# Patient Record
Sex: MALE | Race: OTHER | Employment: OTHER | URBAN - METROPOLITAN AREA
[De-identification: names, ages, dates, MRNs, and addresses within clinical notes are randomized per-mention and may not be internally consistent; named-entity substitution may affect disease eponyms.]

---

## 2017-03-29 ENCOUNTER — HOSPITAL ENCOUNTER (OUTPATIENT)
Age: 67
Discharge: HOME | End: 2017-03-29
Payer: COMMERCIAL

## 2017-03-29 LAB
ANION GAP SERPL CALCULATED.3IONS-SCNC: 12 MMOL/L (ref 9–17)
BUN BLDV-MCNC: 20 MG/DL (ref 8–23)
BUN/CREAT BLD: 15 (ref 9–20)
CALCIUM SERPL-MCNC: 8.7 MG/DL (ref 8.6–10.4)
CHLORIDE BLD-SCNC: 100 MMOL/L (ref 98–107)
CO2: 24 MMOL/L (ref 20–31)
CREAT SERPL-MCNC: 1.32 MG/DL (ref 0.7–1.2)
GFR AFRICAN AMERICAN: >60 ML/MIN
GFR NON-AFRICAN AMERICAN: 54 ML/MIN
GFR SERPL CREATININE-BSD FRML MDRD: ABNORMAL ML/MIN/{1.73_M2}
GFR SERPL CREATININE-BSD FRML MDRD: ABNORMAL ML/MIN/{1.73_M2}
GLUCOSE BLD-MCNC: 104 MG/DL (ref 70–99)
POTASSIUM SERPL-SCNC: 3.9 MMOL/L (ref 3.7–5.3)
SODIUM BLD-SCNC: 136 MMOL/L (ref 135–144)

## 2017-03-29 PROCEDURE — 36415 COLL VENOUS BLD VENIPUNCTURE: CPT

## 2017-03-29 PROCEDURE — 80048 BASIC METABOLIC PNL TOTAL CA: CPT

## 2017-07-28 ENCOUNTER — HOSPITAL ENCOUNTER (OUTPATIENT)
Age: 67
Discharge: HOME | End: 2017-07-28
Payer: COMMERCIAL

## 2017-07-28 LAB
ABSOLUTE EOS #: 0.2 K/UL (ref 0–0.4)
ABSOLUTE LYMPH #: 0.8 K/UL (ref 1–4.8)
ABSOLUTE MONO #: 0.3 K/UL (ref 0.2–0.8)
ALBUMIN SERPL-MCNC: 3.6 G/DL (ref 3.5–5.2)
ALBUMIN/GLOBULIN RATIO: ABNORMAL (ref 1–2.5)
ALP BLD-CCNC: 72 U/L (ref 40–129)
ALT SERPL-CCNC: 9 U/L (ref 5–41)
ANION GAP SERPL CALCULATED.3IONS-SCNC: 13 MMOL/L (ref 9–17)
AST SERPL-CCNC: 17 U/L
BASOPHILS # BLD: 1 %
BASOPHILS ABSOLUTE: 0 K/UL (ref 0–0.2)
BILIRUB SERPL-MCNC: 0.33 MG/DL (ref 0.3–1.2)
BUN BLDV-MCNC: 17 MG/DL (ref 8–23)
BUN/CREAT BLD: 13 (ref 9–20)
CALCIUM SERPL-MCNC: 8.4 MG/DL (ref 8.6–10.4)
CHLORIDE BLD-SCNC: 102 MMOL/L (ref 98–107)
CHOLESTEROL/HDL RATIO: 3.5
CHOLESTEROL: 148 MG/DL
CO2: 25 MMOL/L (ref 20–31)
CREAT SERPL-MCNC: 1.3 MG/DL (ref 0.7–1.2)
DIFFERENTIAL TYPE: ABNORMAL
EOSINOPHILS RELATIVE PERCENT: 5 %
GFR AFRICAN AMERICAN: >60 ML/MIN
GFR NON-AFRICAN AMERICAN: 55 ML/MIN
GFR SERPL CREATININE-BSD FRML MDRD: ABNORMAL ML/MIN/{1.73_M2}
GFR SERPL CREATININE-BSD FRML MDRD: ABNORMAL ML/MIN/{1.73_M2}
GLUCOSE BLD-MCNC: 104 MG/DL (ref 70–99)
HCT VFR BLD CALC: 36.9 % (ref 41–53)
HDLC SERPL-MCNC: 42 MG/DL
HEMOGLOBIN: 12.1 G/DL (ref 13.5–17.5)
LDL CHOLESTEROL: 92 MG/DL (ref 0–130)
LYMPHOCYTES # BLD: 27 %
MCH RBC QN AUTO: 25.7 PG (ref 26–34)
MCHC RBC AUTO-ENTMCNC: 32.8 G/DL (ref 31–37)
MCV RBC AUTO: 78.3 FL (ref 80–100)
MONOCYTES # BLD: 10 %
PDW BLD-RTO: 14.6 % (ref 11.5–14.5)
PLATELET # BLD: 296 K/UL (ref 130–400)
PLATELET ESTIMATE: ABNORMAL
PMV BLD AUTO: 8.2 FL (ref 6–12)
POTASSIUM SERPL-SCNC: 3.7 MMOL/L (ref 3.7–5.3)
RBC # BLD: 4.71 M/UL (ref 4.5–5.9)
RBC # BLD: ABNORMAL 10*6/UL
SEG NEUTROPHILS: 57 %
SEGMENTED NEUTROPHILS ABSOLUTE COUNT: 1.7 K/UL (ref 1.8–7.7)
SODIUM BLD-SCNC: 140 MMOL/L (ref 135–144)
THYROXINE, FREE: 0.73 NG/DL (ref 0.93–1.7)
TOTAL PROTEIN: 7.2 G/DL (ref 6.4–8.3)
TRIGL SERPL-MCNC: 71 MG/DL
TSH SERPL DL<=0.05 MIU/L-ACNC: 12.13 MIU/L (ref 0.3–5)
VLDLC SERPL CALC-MCNC: NORMAL MG/DL (ref 1–30)
WBC # BLD: 2.9 K/UL (ref 3.5–11)
WBC # BLD: ABNORMAL 10*3/UL

## 2017-07-28 PROCEDURE — 84439 ASSAY OF FREE THYROXINE: CPT

## 2017-07-28 PROCEDURE — 80061 LIPID PANEL: CPT

## 2017-07-28 PROCEDURE — 80053 COMPREHEN METABOLIC PANEL: CPT

## 2017-07-28 PROCEDURE — 85025 COMPLETE CBC W/AUTO DIFF WBC: CPT

## 2017-07-28 PROCEDURE — 84443 ASSAY THYROID STIM HORMONE: CPT

## 2017-07-28 PROCEDURE — 36415 COLL VENOUS BLD VENIPUNCTURE: CPT

## 2018-08-31 ENCOUNTER — HOSPITAL ENCOUNTER (OUTPATIENT)
Dept: PREADMISSION TESTING | Age: 68
Discharge: HOME | End: 2018-09-04
Payer: COMMERCIAL

## 2018-08-31 VITALS
BODY MASS INDEX: 27.7 KG/M2 | WEIGHT: 182.76 LBS | HEIGHT: 68 IN | RESPIRATION RATE: 18 BRPM | SYSTOLIC BLOOD PRESSURE: 138 MMHG | OXYGEN SATURATION: 99 % | DIASTOLIC BLOOD PRESSURE: 90 MMHG | HEART RATE: 54 BPM

## 2018-08-31 LAB
ABSOLUTE EOS #: 0.2 K/UL (ref 0–0.4)
ABSOLUTE IMMATURE GRANULOCYTE: ABNORMAL K/UL (ref 0–0.3)
ABSOLUTE LYMPH #: 0.9 K/UL (ref 1–4.8)
ABSOLUTE MONO #: 0.3 K/UL (ref 0.2–0.8)
ALBUMIN SERPL-MCNC: 3.8 G/DL (ref 3.5–5.2)
ALBUMIN/GLOBULIN RATIO: ABNORMAL (ref 1–2.5)
ALP BLD-CCNC: 70 U/L (ref 40–129)
ALT SERPL-CCNC: 7 U/L (ref 5–41)
ANION GAP SERPL CALCULATED.3IONS-SCNC: 10 MMOL/L (ref 9–17)
AST SERPL-CCNC: 17 U/L
BASOPHILS # BLD: 1 % (ref 0–2)
BASOPHILS ABSOLUTE: 0 K/UL (ref 0–0.2)
BILIRUB SERPL-MCNC: 0.27 MG/DL (ref 0.3–1.2)
BILIRUBIN DIRECT: <0.08 MG/DL
BILIRUBIN, INDIRECT: ABNORMAL MG/DL (ref 0–1)
BUN BLDV-MCNC: 13 MG/DL (ref 8–23)
CHLORIDE BLD-SCNC: 104 MMOL/L (ref 98–107)
CO2: 25 MMOL/L (ref 20–31)
CREAT SERPL-MCNC: 1.22 MG/DL (ref 0.7–1.2)
DIFFERENTIAL TYPE: ABNORMAL
EKG ATRIAL RATE: 50 BPM
EKG P AXIS: 32 DEGREES
EKG P-R INTERVAL: 178 MS
EKG Q-T INTERVAL: 476 MS
EKG QRS DURATION: 114 MS
EKG QTC CALCULATION (BAZETT): 433 MS
EKG R AXIS: -35 DEGREES
EKG T AXIS: 35 DEGREES
EKG VENTRICULAR RATE: 50 BPM
EOSINOPHILS RELATIVE PERCENT: 6 % (ref 1–4)
GFR AFRICAN AMERICAN: >60 ML/MIN
GFR NON-AFRICAN AMERICAN: 59 ML/MIN
GFR SERPL CREATININE-BSD FRML MDRD: ABNORMAL ML/MIN/{1.73_M2}
GFR SERPL CREATININE-BSD FRML MDRD: ABNORMAL ML/MIN/{1.73_M2}
GLOBULIN: ABNORMAL G/DL (ref 1.5–3.8)
HCT VFR BLD CALC: 36.1 % (ref 41–53)
HEMOGLOBIN: 11.9 G/DL (ref 13.5–17.5)
IMMATURE GRANULOCYTES: ABNORMAL %
INR BLD: 1
LYMPHOCYTES # BLD: 21 % (ref 24–44)
MCH RBC QN AUTO: 26.7 PG (ref 26–34)
MCHC RBC AUTO-ENTMCNC: 32.9 G/DL (ref 31–37)
MCV RBC AUTO: 81.4 FL (ref 80–100)
MONOCYTES # BLD: 6 % (ref 1–7)
NRBC AUTOMATED: ABNORMAL PER 100 WBC
PARTIAL THROMBOPLASTIN TIME: 27.8 SEC (ref 23–31)
PDW BLD-RTO: 15.5 % (ref 11.5–14.5)
PLATELET # BLD: 346 K/UL (ref 130–400)
PLATELET ESTIMATE: ABNORMAL
PMV BLD AUTO: 8 FL (ref 6–12)
POTASSIUM SERPL-SCNC: 3.9 MMOL/L (ref 3.7–5.3)
PROTHROMBIN TIME: 10.3 SEC (ref 9.7–11.6)
RBC # BLD: 4.43 M/UL (ref 4.5–5.9)
RBC # BLD: ABNORMAL 10*6/UL
SEG NEUTROPHILS: 66 % (ref 36–66)
SEGMENTED NEUTROPHILS ABSOLUTE COUNT: 2.8 K/UL (ref 1.8–7.7)
SODIUM BLD-SCNC: 139 MMOL/L (ref 135–144)
TOTAL PROTEIN: 7.4 G/DL (ref 6.4–8.3)
WBC # BLD: 4.3 K/UL (ref 3.5–11)
WBC # BLD: ABNORMAL 10*3/UL

## 2018-08-31 PROCEDURE — 85610 PROTHROMBIN TIME: CPT

## 2018-08-31 PROCEDURE — 80076 HEPATIC FUNCTION PANEL: CPT

## 2018-08-31 PROCEDURE — 93005 ELECTROCARDIOGRAM TRACING: CPT

## 2018-08-31 PROCEDURE — 85025 COMPLETE CBC W/AUTO DIFF WBC: CPT

## 2018-08-31 PROCEDURE — 36415 COLL VENOUS BLD VENIPUNCTURE: CPT

## 2018-08-31 PROCEDURE — 84520 ASSAY OF UREA NITROGEN: CPT

## 2018-08-31 PROCEDURE — 85730 THROMBOPLASTIN TIME PARTIAL: CPT

## 2018-08-31 PROCEDURE — 80051 ELECTROLYTE PANEL: CPT

## 2018-08-31 PROCEDURE — 82565 ASSAY OF CREATININE: CPT

## 2018-08-31 RX ORDER — LEVOTHYROXINE SODIUM 0.12 MG/1
175 TABLET ORAL DAILY
COMMUNITY
End: 2020-02-14 | Stop reason: SDUPTHER

## 2018-08-31 RX ORDER — ASPIRIN 81 MG/1
81 TABLET ORAL DAILY
COMMUNITY

## 2018-08-31 RX ORDER — SILDENAFIL 50 MG/1
50 TABLET, FILM COATED ORAL PRN
COMMUNITY
End: 2020-02-14
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
## 2018-08-31 NOTE — PROGRESS NOTES
before surgery and the morning of surgery with a special soap called chlorhexidine gluconate (CHG*). *Not to be used by people allergic to Chlorhexidine Gluconate (CHG). Following these instructions will help you be sure that your skin is clean before surgery. Instructions on cleaning your skin before surgery: The night before your surgery:      You will need to shower with warm water (not hot) and the CHG soap.  Use a clean wash cloth and a clean towel. Have clean clothes available to put on after the shower.    First wash your hair with regular shampoo. Rinse your hair and body thoroughly to remove the shampoo. [facility] Wash your face with your regular soap or water only. Thoroughly rinse your body with warm water from the neck down.  Turn water off to prevent rinsing the soap off too soon.  With a clean wet washcloth and half of the CHG soap in the bottle, lather your entire body from the neck down. Do not use CHG soap near your eyes or ears to avoid injury to those areas.  Wash thoroughly, paying special attention to the area where your surgery will be performed.  Wash your body gently for five (5) minutes. Avoid scrubbing your skin too hard.  Turn the water back on and rinse your body thoroughly.  Pat yourself dry with a clean, soft towel. Do not apply lotion, cream or powder.  Dress with clean freshly washed clothes. The morning of surgery:     Repeat shower following steps above - using remaining half of CHG soap in bottle. Patient Instructions:    [facility] If you are having any type of anesthesia you are to have nothing to eat or drink after midnight the night before your surgery. This includes gum, mints, water or smoking or chewing tobacco.  The only exception to this is a small sip of water to take with any morning dose of heart, blood pressure, or seizure medications. No alcoholic beverages for 24 hours prior to surgery.      Brush your teeth but do not swallow water.  Bring your eyeglasses and case with you. No contacts are to be worn the day of surgery. You also may bring your hearing aids.  If you are on C-PAP or Bi-PAP at home and plan on staying in the hospital overnight for your surgery please bring the machine with you. · Do not wear any jewelry or body piercings day of surgery. Also, NO lotion, perfume or deodorant to be used the day of surgery. No nail polish on the operative extremity (arm/leg surgeries)    · Do not bring any valuables such as jewelry, cash, or credit cards. If you are staying overnight with us, please bring a small bag of personal items.  Please wear loose, comfortable clothing. If you are potentially going to have a cast or brace bring clothing that will fit over them.  In case of illness - If you have cold or flu like symptoms (high fever, runny nose, sore throat, cough, etc.) rash, nausea, vomiting, loose stools, and/or recent contact with someone who has a contagious disease (chicken pox, measles, etc.) Please call your doctor before coming to the hospital.     If your child is having surgery please make arrangements for any other children to be cared for at home on the day of surgery. Other children are not permitted in recovery room and we want you to be able to spend time with the patient. If other arrangements are not available then we suggest that you have a second adult to stay in the waiting room. Day of Surgery/Procedure:    As a patient at [facility] you can expect quality medical and nursing care that is centered on your individual needs. Our goal is to make your surgical experience as comfortable as possible    . Transportation After Your Surgery/Procedure: You will need a friend or family member to drive you home after your procedure.   Your  must be

## 2018-09-06 ENCOUNTER — ANESTHESIA EVENT (OUTPATIENT)
Dept: OPERATING ROOM | Age: 68
End: 2018-09-06
Payer: COMMERCIAL

## 2018-09-07 ENCOUNTER — APPOINTMENT (OUTPATIENT)
Dept: GENERAL RADIOLOGY | Age: 68
End: 2018-09-07
Attending: PODIATRIST
Payer: COMMERCIAL

## 2018-09-07 ENCOUNTER — HOSPITAL ENCOUNTER (OUTPATIENT)
Age: 68
Setting detail: OUTPATIENT SURGERY
Discharge: HOME | End: 2018-09-07
Attending: PODIATRIST | Admitting: PODIATRIST
Payer: COMMERCIAL

## 2018-09-07 ENCOUNTER — ANESTHESIA (OUTPATIENT)
Dept: OPERATING ROOM | Age: 68
End: 2018-09-07
Payer: COMMERCIAL

## 2018-09-07 VITALS
DIASTOLIC BLOOD PRESSURE: 98 MMHG | TEMPERATURE: 97.2 F | HEART RATE: 47 BPM | WEIGHT: 182.76 LBS | OXYGEN SATURATION: 100 % | RESPIRATION RATE: 12 BRPM | SYSTOLIC BLOOD PRESSURE: 159 MMHG | BODY MASS INDEX: 27.7 KG/M2 | HEIGHT: 68 IN

## 2018-09-07 VITALS
RESPIRATION RATE: 11 BRPM | OXYGEN SATURATION: 96 % | DIASTOLIC BLOOD PRESSURE: 75 MMHG | SYSTOLIC BLOOD PRESSURE: 125 MMHG

## 2018-09-07 DIAGNOSIS — G89.18 POSTOPERATIVE PAIN: Primary | ICD-10-CM

## 2018-09-07 DIAGNOSIS — Z98.890 STATUS POST RIGHT FOOT SURGERY: ICD-10-CM

## 2018-09-07 PROCEDURE — 7100000010 HC PHASE II RECOVERY - FIRST 15 MIN: Performed by: PODIATRIST

## 2018-09-07 PROCEDURE — C9359 IMPLNT,BON VOID FILLER-PUTTY: HCPCS | Performed by: PODIATRIST

## 2018-09-07 PROCEDURE — 3700000001 HC ADD 15 MINUTES (ANESTHESIA): Performed by: PODIATRIST

## 2018-09-07 PROCEDURE — 3600000003 HC SURGERY LEVEL 3 BASE: Performed by: PODIATRIST

## 2018-09-07 PROCEDURE — 6360000002 HC RX W HCPCS: Performed by: NURSE ANESTHETIST, CERTIFIED REGISTERED

## 2018-09-07 PROCEDURE — 73660 X-RAY EXAM OF TOE(S): CPT

## 2018-09-07 PROCEDURE — 3209999900 FLUORO FOR SURGICAL PROCEDURES

## 2018-09-07 PROCEDURE — C1713 ANCHOR/SCREW BN/BN,TIS/BN: HCPCS | Performed by: PODIATRIST

## 2018-09-07 PROCEDURE — 7100000011 HC PHASE II RECOVERY - ADDTL 15 MIN: Performed by: PODIATRIST

## 2018-09-07 PROCEDURE — 2720000010 HC SURG SUPPLY STERILE: Performed by: PODIATRIST

## 2018-09-07 PROCEDURE — 2709999900 HC NON-CHARGEABLE SUPPLY: Performed by: PODIATRIST

## 2018-09-07 PROCEDURE — 3700000000 HC ANESTHESIA ATTENDED CARE: Performed by: PODIATRIST

## 2018-09-07 PROCEDURE — 2580000003 HC RX 258: Performed by: NURSE ANESTHETIST, CERTIFIED REGISTERED

## 2018-09-07 PROCEDURE — 3600000013 HC SURGERY LEVEL 3 ADDTL 15MIN: Performed by: PODIATRIST

## 2018-09-07 DEVICE — IMPLANTABLE DEVICE
Type: IMPLANTABLE DEVICE | Site: FOOT | Status: FUNCTIONAL
Brand: ORTHOLOC 3DI

## 2018-09-07 DEVICE — GRAFT BNE PTTY 1 CC DBM: Type: IMPLANTABLE DEVICE | Site: FOOT | Status: FUNCTIONAL

## 2018-09-07 RX ORDER — SODIUM CHLORIDE 0.9 % (FLUSH) 0.9 %
10 SYRINGE (ML) INJECTION EVERY 12 HOURS SCHEDULED
Status: DISCONTINUED | OUTPATIENT
Start: 2018-09-07 | End: 2018-09-07 | Stop reason: HOSPADM

## 2018-09-07 RX ORDER — PROPOFOL 10 MG/ML
INJECTION, EMULSION INTRAVENOUS PRN
Status: DISCONTINUED | OUTPATIENT
Start: 2018-09-07 | End: 2018-09-07 | Stop reason: SDUPTHER

## 2018-09-07 RX ORDER — HYDROMORPHONE HCL 110MG/55ML
0.5 PATIENT CONTROLLED ANALGESIA SYRINGE INTRAVENOUS EVERY 5 MIN PRN
Status: DISCONTINUED | OUTPATIENT
Start: 2018-09-07 | End: 2018-09-07 | Stop reason: HOSPADM

## 2018-09-07 RX ORDER — DIPHENHYDRAMINE HYDROCHLORIDE 50 MG/ML
12.5 INJECTION INTRAMUSCULAR; INTRAVENOUS
Status: DISCONTINUED | OUTPATIENT
Start: 2018-09-07 | End: 2018-09-07 | Stop reason: HOSPADM

## 2018-09-07 RX ORDER — DEXAMETHASONE SODIUM PHOSPHATE 10 MG/ML
4 INJECTION INTRAMUSCULAR; INTRAVENOUS
Status: DISCONTINUED | OUTPATIENT
Start: 2018-09-07 | End: 2018-09-07 | Stop reason: HOSPADM

## 2018-09-07 RX ORDER — FENTANYL CITRATE 50 UG/ML
25 INJECTION, SOLUTION INTRAMUSCULAR; INTRAVENOUS EVERY 5 MIN PRN
Status: DISCONTINUED | OUTPATIENT
Start: 2018-09-07 | End: 2018-09-07 | Stop reason: HOSPADM

## 2018-09-07 RX ORDER — LABETALOL HYDROCHLORIDE 5 MG/ML
5 INJECTION, SOLUTION INTRAVENOUS EVERY 10 MIN PRN
Status: DISCONTINUED | OUTPATIENT
Start: 2018-09-07 | End: 2018-09-07 | Stop reason: HOSPADM

## 2018-09-07 RX ORDER — SODIUM CHLORIDE 9 MG/ML
INJECTION, SOLUTION INTRAVENOUS CONTINUOUS
Status: DISCONTINUED | OUTPATIENT
Start: 2018-09-08 | End: 2018-09-07 | Stop reason: HOSPADM

## 2018-09-07 RX ORDER — SODIUM CHLORIDE, SODIUM LACTATE, POTASSIUM CHLORIDE, CALCIUM CHLORIDE 600; 310; 30; 20 MG/100ML; MG/100ML; MG/100ML; MG/100ML
INJECTION, SOLUTION INTRAVENOUS CONTINUOUS
Status: DISCONTINUED | OUTPATIENT
Start: 2018-09-08 | End: 2018-09-07 | Stop reason: HOSPADM

## 2018-09-07 RX ORDER — PROPOFOL 10 MG/ML
INJECTION, EMULSION INTRAVENOUS CONTINUOUS PRN
Status: DISCONTINUED | OUTPATIENT
Start: 2018-09-07 | End: 2018-09-07 | Stop reason: SDUPTHER

## 2018-09-07 RX ORDER — CEFAZOLIN SODIUM 1 G/3ML
INJECTION, POWDER, FOR SOLUTION INTRAMUSCULAR; INTRAVENOUS PRN
Status: DISCONTINUED | OUTPATIENT
Start: 2018-09-07 | End: 2018-09-07 | Stop reason: SDUPTHER

## 2018-09-07 RX ORDER — ONDANSETRON 2 MG/ML
4 INJECTION INTRAMUSCULAR; INTRAVENOUS
Status: DISCONTINUED | OUTPATIENT
Start: 2018-09-07 | End: 2018-09-07 | Stop reason: HOSPADM

## 2018-09-07 RX ORDER — SODIUM CHLORIDE 0.9 % (FLUSH) 0.9 %
10 SYRINGE (ML) INJECTION PRN
Status: DISCONTINUED | OUTPATIENT
Start: 2018-09-07 | End: 2018-09-07 | Stop reason: HOSPADM

## 2018-09-07 RX ORDER — LIDOCAINE HYDROCHLORIDE 10 MG/ML
1 INJECTION, SOLUTION EPIDURAL; INFILTRATION; INTRACAUDAL; PERINEURAL
Status: DISCONTINUED | OUTPATIENT
Start: 2018-09-07 | End: 2018-09-07 | Stop reason: HOSPADM

## 2018-09-07 RX ORDER — SODIUM CHLORIDE, SODIUM LACTATE, POTASSIUM CHLORIDE, CALCIUM CHLORIDE 600; 310; 30; 20 MG/100ML; MG/100ML; MG/100ML; MG/100ML
INJECTION, SOLUTION INTRAVENOUS CONTINUOUS PRN
Status: DISCONTINUED | OUTPATIENT
Start: 2018-09-07 | End: 2018-09-07 | Stop reason: SDUPTHER

## 2018-09-07 RX ORDER — HYDRALAZINE HYDROCHLORIDE 20 MG/ML
5 INJECTION INTRAMUSCULAR; INTRAVENOUS EVERY 10 MIN PRN
Status: DISCONTINUED | OUTPATIENT
Start: 2018-09-07 | End: 2018-09-07 | Stop reason: HOSPADM

## 2018-09-07 RX ORDER — MEPERIDINE HYDROCHLORIDE 50 MG/ML
12.5 INJECTION INTRAMUSCULAR; INTRAVENOUS; SUBCUTANEOUS EVERY 5 MIN PRN
Status: DISCONTINUED | OUTPATIENT
Start: 2018-09-07 | End: 2018-09-07 | Stop reason: HOSPADM

## 2018-09-07 RX ORDER — FENTANYL CITRATE 50 UG/ML
INJECTION, SOLUTION INTRAMUSCULAR; INTRAVENOUS PRN
Status: DISCONTINUED | OUTPATIENT
Start: 2018-09-07 | End: 2018-09-07 | Stop reason: SDUPTHER

## 2018-09-07 RX ORDER — HYDROCODONE BITARTRATE AND ACETAMINOPHEN 7.5; 325 MG/1; MG/1
1 TABLET ORAL EVERY 6 HOURS PRN
Qty: 28 TABLET | Refills: 0 | Status: SHIPPED | OUTPATIENT
Start: 2018-09-07 | End: 2018-09-14

## 2018-09-07 RX ADMIN — FENTANYL CITRATE 25 MCG: 50 INJECTION, SOLUTION INTRAMUSCULAR; INTRAVENOUS at 09:22

## 2018-09-07 RX ADMIN — PROPOFOL 125 MCG/KG/MIN: 10 INJECTION, EMULSION INTRAVENOUS at 08:55

## 2018-09-07 RX ADMIN — FENTANYL CITRATE 50 MCG: 50 INJECTION, SOLUTION INTRAMUSCULAR; INTRAVENOUS at 08:55

## 2018-09-07 RX ADMIN — CEFAZOLIN 2000 MG: 1 INJECTION, POWDER, FOR SOLUTION INTRAMUSCULAR; INTRAVENOUS at 09:00

## 2018-09-07 RX ADMIN — FENTANYL CITRATE 25 MCG: 50 INJECTION, SOLUTION INTRAMUSCULAR; INTRAVENOUS at 10:00

## 2018-09-07 RX ADMIN — SODIUM CHLORIDE, POTASSIUM CHLORIDE, SODIUM LACTATE AND CALCIUM CHLORIDE: 600; 310; 30; 20 INJECTION, SOLUTION INTRAVENOUS at 08:50

## 2018-09-07 RX ADMIN — PROPOFOL 50 MG: 10 INJECTION, EMULSION INTRAVENOUS at 08:57

## 2018-09-07 ASSESSMENT — PULMONARY FUNCTION TESTS
PIF_VALUE: 1
PIF_VALUE: 0
PIF_VALUE: 1
PIF_VALUE: 0
PIF_VALUE: 1
PIF_VALUE: 0
PIF_VALUE: 1
PIF_VALUE: 0
PIF_VALUE: 0
PIF_VALUE: 1
PIF_VALUE: 0
PIF_VALUE: 1
PIF_VALUE: 0
PIF_VALUE: 1
PIF_VALUE: 1
PIF_VALUE: 0
PIF_VALUE: 1
PIF_VALUE: 0
PIF_VALUE: 1
PIF_VALUE: 0
PIF_VALUE: 0
PIF_VALUE: 1
PIF_VALUE: 1
PIF_VALUE: 0
PIF_VALUE: 1
PIF_VALUE: 1
PIF_VALUE: 0
PIF_VALUE: 1
PIF_VALUE: 0
PIF_VALUE: 1

## 2018-09-07 ASSESSMENT — PAIN SCALES - GENERAL
PAINLEVEL_OUTOF10: 0
PAINLEVEL_OUTOF10: 0

## 2018-09-07 NOTE — ANESTHESIA POSTPROCEDURE EVALUATION
Department of Anesthesiology  Postprocedure Note    Patient: Krystal Duncan  MRN: [id number]  YOB: 1950  Date of evaluation: 9/7/2018  Time:  2:30 PM     Procedure Summary     Date:  09/07/18 Room / Location:  [facility] OR 01 / [facility] OR    Anesthesia Start:  0850 Anesthesia Stop:  1024    Procedure:  RIGHT FOOT ARTHRODESIS  1ST MPJ- [facility] (Right Foot) Diagnosis:  (DX HALLUX VALGUS RIGHT)    Surgeon:  Deidra Myrick DPM Responsible Provider:  Zachary Lloyd MD    Anesthesia Type:  MAC ASA Status:  2          Anesthesia Type: MAC    Darion Phase I: Darion Score: 9    Darion Phase II: Darion Score: 7    Last vitals: Reviewed and per EMR flowsheets.        Anesthesia Post Evaluation    Patient location during evaluation: PACU  Patient participation: complete - patient participated  Level of consciousness: awake  Pain score: 0  Airway patency: patent  Nausea & Vomiting: no nausea and no vomiting  Complications: no  Cardiovascular status: blood pressure returned to baseline  Respiratory status: acceptable  Hydration status: euvolemic

## 2018-09-07 NOTE — H&P
History and Physical Update    Pt Name: Jaspal Khoury  MRN: [id number]  YOB: 1950  Date of evaluation: 9/7/2018      [x] I have reviewed the H&P by Liya Isidro NP on 8/31/18   which meets the criteria for an Interval History and Physical note and is attached below. [x] I have examined  Robin Rashid  There are no changes to the patient who is scheduled for a right hallux valgus by  [facility]. The patient denies health changes, fever, chills, productive cough, SOB, skin changes or chest pain. Vital signs: /84   Pulse 56   Temp 97.3 °F (36.3 °C) (Oral)   Resp 16   Ht 5' 8\" (1.727 m)   Wt 182 lb 12.2 oz (82.9 kg)   SpO2 97%   BMI 27.79 kg/m²     Allergies:  Patient has no known allergies. Medications:    Prior to Admission medications    Medication Sig Start Date End Date Taking? Authorizing Provider   levothyroxine (SYNTHROID) 125 MCG tablet Take 125 mcg by mouth Daily   Yes Historical Provider, MD   hydrochlorothiazide (HYDRODIURIL) 25 MG tablet Take 25 mg by mouth daily  3/13/16  Yes Historical Provider, MD   lisinopril (PRINIVIL;ZESTRIL) 5 MG tablet Take 5 mg by mouth daily   Yes Historical Provider, MD   tamsulosin (FLOMAX) 0.4 MG capsule Take 0.4 mg by mouth daily   Yes Historical Provider, MD   sildenafil (VIAGRA) 50 MG tablet Take 50 mg by mouth as needed for Erectile Dysfunction    Historical Provider, MD   aspirin 81 MG tablet Take 81 mg by mouth daily    Historical Provider, MD       This is a 79 y.o. male who is pleasant, cooperative, alert and oriented x3, in no acute distress. Heart: Heart sounds are normal.  HR regular rate and rhythm without murmur, gallop or rub. Lungs: Normal respiratory effort with good air exchange and clear to auscultation without wheezes or rales bilaterally   Abdomen: soft, nontender, nondistended with bowel sounds .        Labs:  Recent Labs      08/31/18   1537   HGB  11.9*   HCT  36.1*   WBC  4.3   MCV  81.4   PLT  346   NA  139   K  3.9 Family History   Problem Relation Age of Onset    High Blood Pressure Mother      Arthritis Mother      Heart Disease Sister      Stomach Cancer Sister      Diabetes Brother              Review of Systems:      Positive and Negative as described in HPI.     CONSTITUTIONAL: Negative for fevers, chills, sweats, fatigue, and weight loss. HEENT: Bilateral cataract removal. Wears glasses. Full dentures. Negative for hearing changes, dysphagia, runny nose, and throat pain. RESPIRATORY:  Denies asthma, COPD, and sleep apnea. Negative for shortness of breath, cough, congestion, and wheezing. CARDIOVASCULAR:  Negative for chest pain, blood clot, irregular heart beat, and palpitations. GASTROINTESTINAL: History of hepatitis C. Negative for reflux, nausea, vomiting, diarrhea, constipation, change in bowel habits, and abdominal pain. GENITOURINARY: Urinary retention. Negative for difficulty of urination, burning with urination, urgency, enlarged prostate, hematuria, and frequency. INTEGUMENT: Right ankle bruise. Negative for rash and skin lesions. HEMATOLOGIC/LYMPHATIC: Occasional right foot swelling and right arm swelling. ALLERGIC/IMMUNOLOGIC: Negative for urticaria and itching. ENDOCRINE: Hypothyroidism. Negative for diabetes, increase in drinking, increase in urination, and heat or cold intolerance. MUSCULOSKELETAL: Negative joint pains and muscle aches. NEUROLOGICAL: Negative for strokes, headaches, dizziness, lightheadedness, numbness, and tingling extremities. BEHAVIOR/PSYCH: Negative for depression and anxiety.     Physical Exam:   BP (!) 138/90   Pulse 54   Resp 18   Ht 5' 8\" (1.727 m)   Wt 182 lb 12.2 oz (82.9 kg)   SpO2 99%   BMI 27.79 kg/m²     No results for input(s): POCGLU in the last 72 hours.      General Appearance:  Alert, well appearing, and in no acute distress. Mental status:  Oriented to person, place, and time. Head:  Normocephalic and atraumatic.   Eye:  No icterus, redness, pupils equal and reactive, extraocular eye movements intact, and conjunctiva clear. Ear:  Hearing grossly intact. Nose:  No drainage noted. Mouth: No dentition noted. Airway is patent. Mucous membranes moist. No tongue deviation. Neck:  Supple and no carotid bruits noted. Lungs:  Bilateral equal air entry, clear to auscultation, no wheezing, rales or rhonchi, and normal effort. Cardiovascular: Bradycardia. Regular rhythm, no murmur, gallop, and rub. Abdomen:  Soft, non-tender, non-distended, and active bowel sounds. Neurologic:  Muffled speech. Cranial nerves II through XII grossly intact. Strength 5/5 bilaterally. Skin: Left hand skin graft noted. Right ankle bruise. No gross lesions, rashes, or bleeding on exposed skin area. Extremities: Right ankle 1+ non-pitting edema. No left ankle edema noted. Posterior tibial pulses 2+ bilaterally. No calf tenderness with palpation.   Psych: Normal affect.      Investigations:       Laboratory Testing:  Recent Results         Recent Results (from the past 24 hour(s))   CBC Auto Differential     Collection Time: 08/31/18  3:37 PM   Result Value Ref Range     WBC 4.3 3.5 - 11.0 k/uL     RBC 4.43 (L) 4.5 - 5.9 m/uL     Hemoglobin 11.9 (L) 13.5 - 17.5 g/dL     Hematocrit 36.1 (L) 41 - 53 %     MCV 81.4 80 - 100 fL     MCH 26.7 26 - 34 pg     MCHC 32.9 31 - 37 g/dL     RDW 15.5 (H) 11.5 - 14.5 %     Platelets 493 566 - 945 k/uL     MPV 8.0 6.0 - 12.0 fL     NRBC Automated NOT REPORTED per 100 WBC     Differential Type NOT REPORTED       Seg Neutrophils 66 36 - 66 %     Lymphocytes 21 (L) 24 - 44 %     Monocytes 6 1 - 7 %     Eosinophils % 6 (H) 1 - 4 %     Basophils 1 0 - 2 %     Immature Granulocytes NOT REPORTED 0 %     Segs Absolute 2.80 1.8 - 7.7 k/uL     Absolute Lymph # 0.90 (L) 1.0 - 4.8 k/uL     Absolute Mono # 0.30 0.2 - 0.8 k/uL     Absolute Eos # 0.20 0.0 - 0.4 k/uL     Basophils # 0.00 0.0 - 0.2 k/uL     Absolute Immature Granulocyte NOT REPORTED

## 2018-09-07 NOTE — PROGRESS NOTES
Patient and patient's family given education on crutch use. Patient demonstrated the use of crutches appropriately.

## 2018-10-15 ENCOUNTER — ANESTHESIA EVENT (OUTPATIENT)
Dept: OPERATING ROOM | Age: 68
End: 2018-10-15
Payer: COMMERCIAL

## 2018-10-16 ENCOUNTER — ANESTHESIA (OUTPATIENT)
Dept: OPERATING ROOM | Age: 68
End: 2018-10-16
Payer: COMMERCIAL

## 2018-10-16 ENCOUNTER — HOSPITAL ENCOUNTER (OUTPATIENT)
Age: 68
Setting detail: OUTPATIENT SURGERY
Discharge: HOME | End: 2018-10-16
Attending: PODIATRIST | Admitting: PODIATRIST
Payer: COMMERCIAL

## 2018-10-16 VITALS
WEIGHT: 186.2 LBS | DIASTOLIC BLOOD PRESSURE: 86 MMHG | OXYGEN SATURATION: 99 % | TEMPERATURE: 98.2 F | RESPIRATION RATE: 17 BRPM | HEIGHT: 68 IN | HEART RATE: 71 BPM | BODY MASS INDEX: 28.22 KG/M2 | SYSTOLIC BLOOD PRESSURE: 145 MMHG

## 2018-10-16 DIAGNOSIS — L03.115 CELLULITIS OF RIGHT LEG: ICD-10-CM

## 2018-10-16 DIAGNOSIS — M25.571 PAIN IN JOINT OF RIGHT FOOT: Primary | ICD-10-CM

## 2018-10-16 LAB
ANION GAP SERPL CALCULATED.3IONS-SCNC: 11 MMOL/L (ref 9–17)
BUN BLDV-MCNC: 13 MG/DL (ref 8–23)
CHLORIDE BLD-SCNC: 103 MMOL/L (ref 98–107)
CO2: 25 MMOL/L (ref 20–31)
CREAT SERPL-MCNC: 1.41 MG/DL (ref 0.7–1.2)
GFR AFRICAN AMERICAN: >60 ML/MIN
GFR NON-AFRICAN AMERICAN: 50 ML/MIN
GFR SERPL CREATININE-BSD FRML MDRD: ABNORMAL ML/MIN/{1.73_M2}
GFR SERPL CREATININE-BSD FRML MDRD: ABNORMAL ML/MIN/{1.73_M2}
HCT VFR BLD CALC: 33.1 % (ref 41–53)
HEMOGLOBIN: 11.1 G/DL (ref 13.5–17.5)
MCH RBC QN AUTO: 27.2 PG (ref 26–34)
MCHC RBC AUTO-ENTMCNC: 33.6 G/DL (ref 31–37)
MCV RBC AUTO: 80.7 FL (ref 80–100)
NRBC AUTOMATED: ABNORMAL PER 100 WBC
PDW BLD-RTO: 14.4 % (ref 11.5–14.5)
PLATELET # BLD: 367 K/UL (ref 130–400)
PMV BLD AUTO: 8.2 FL (ref 6–12)
POTASSIUM SERPL-SCNC: 3.5 MMOL/L (ref 3.7–5.3)
RBC # BLD: 4.1 M/UL (ref 4.5–5.9)
SODIUM BLD-SCNC: 139 MMOL/L (ref 135–144)
WBC # BLD: 5.7 K/UL (ref 3.5–11)

## 2018-10-16 PROCEDURE — 82565 ASSAY OF CREATININE: CPT

## 2018-10-16 PROCEDURE — 2580000003 HC RX 258: Performed by: ANESTHESIOLOGY

## 2018-10-16 PROCEDURE — 80051 ELECTROLYTE PANEL: CPT

## 2018-10-16 PROCEDURE — 85027 COMPLETE CBC AUTOMATED: CPT

## 2018-10-16 PROCEDURE — 84520 ASSAY OF UREA NITROGEN: CPT

## 2018-10-16 RX ORDER — DIPHENHYDRAMINE HYDROCHLORIDE 50 MG/ML
12.5 INJECTION INTRAMUSCULAR; INTRAVENOUS
Status: DISCONTINUED | OUTPATIENT
Start: 2018-10-16 | End: 2018-10-16 | Stop reason: HOSPADM

## 2018-10-16 RX ORDER — OXYCODONE HYDROCHLORIDE AND ACETAMINOPHEN 5; 325 MG/1; MG/1
1 TABLET ORAL EVERY 6 HOURS PRN
Qty: 28 TABLET | Refills: 0 | Status: SHIPPED | OUTPATIENT
Start: 2018-10-16 | End: 2018-10-23

## 2018-10-16 RX ORDER — FENTANYL CITRATE 50 UG/ML
25 INJECTION, SOLUTION INTRAMUSCULAR; INTRAVENOUS EVERY 5 MIN PRN
Status: DISCONTINUED | OUTPATIENT
Start: 2018-10-16 | End: 2018-10-16 | Stop reason: HOSPADM

## 2018-10-16 RX ORDER — HYDRALAZINE HYDROCHLORIDE 20 MG/ML
5 INJECTION INTRAMUSCULAR; INTRAVENOUS EVERY 10 MIN PRN
Status: DISCONTINUED | OUTPATIENT
Start: 2018-10-16 | End: 2018-10-16 | Stop reason: HOSPADM

## 2018-10-16 RX ORDER — LIDOCAINE HYDROCHLORIDE 10 MG/ML
1 INJECTION, SOLUTION EPIDURAL; INFILTRATION; INTRACAUDAL; PERINEURAL
Status: DISCONTINUED | OUTPATIENT
Start: 2018-10-17 | End: 2018-10-16 | Stop reason: HOSPADM

## 2018-10-16 RX ORDER — ONDANSETRON 2 MG/ML
4 INJECTION INTRAMUSCULAR; INTRAVENOUS
Status: DISCONTINUED | OUTPATIENT
Start: 2018-10-16 | End: 2018-10-16 | Stop reason: HOSPADM

## 2018-10-16 RX ORDER — SODIUM CHLORIDE, SODIUM LACTATE, POTASSIUM CHLORIDE, CALCIUM CHLORIDE 600; 310; 30; 20 MG/100ML; MG/100ML; MG/100ML; MG/100ML
INJECTION, SOLUTION INTRAVENOUS CONTINUOUS
Status: DISCONTINUED | OUTPATIENT
Start: 2018-10-17 | End: 2018-10-16 | Stop reason: HOSPADM

## 2018-10-16 RX ORDER — DEXAMETHASONE SODIUM PHOSPHATE 10 MG/ML
4 INJECTION INTRAMUSCULAR; INTRAVENOUS
Status: DISCONTINUED | OUTPATIENT
Start: 2018-10-16 | End: 2018-10-16 | Stop reason: HOSPADM

## 2018-10-16 RX ORDER — DOXYCYCLINE HYCLATE 100 MG/1
100 CAPSULE ORAL 2 TIMES DAILY
Qty: 14 CAPSULE | Refills: 0 | Status: SHIPPED | OUTPATIENT
Start: 2018-10-16 | End: 2018-10-23

## 2018-10-16 RX ORDER — HYDROMORPHONE HCL 110MG/55ML
0.5 PATIENT CONTROLLED ANALGESIA SYRINGE INTRAVENOUS EVERY 5 MIN PRN
Status: DISCONTINUED | OUTPATIENT
Start: 2018-10-16 | End: 2018-10-16 | Stop reason: HOSPADM

## 2018-10-16 RX ORDER — MEPERIDINE HYDROCHLORIDE 50 MG/ML
12.5 INJECTION INTRAMUSCULAR; INTRAVENOUS; SUBCUTANEOUS EVERY 5 MIN PRN
Status: DISCONTINUED | OUTPATIENT
Start: 2018-10-16 | End: 2018-10-16 | Stop reason: HOSPADM

## 2018-10-16 RX ORDER — LABETALOL HYDROCHLORIDE 5 MG/ML
5 INJECTION, SOLUTION INTRAVENOUS EVERY 10 MIN PRN
Status: DISCONTINUED | OUTPATIENT
Start: 2018-10-16 | End: 2018-10-16 | Stop reason: HOSPADM

## 2018-10-16 RX ADMIN — SODIUM CHLORIDE, POTASSIUM CHLORIDE, SODIUM LACTATE AND CALCIUM CHLORIDE: 600; 310; 30; 20 INJECTION, SOLUTION INTRAVENOUS at 06:28
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
## 2018-11-08 ENCOUNTER — ANESTHESIA EVENT (OUTPATIENT)
Dept: OPERATING ROOM | Age: 68
End: 2018-11-08
Payer: COMMERCIAL

## 2018-11-09 ENCOUNTER — APPOINTMENT (OUTPATIENT)
Dept: GENERAL RADIOLOGY | Age: 68
End: 2018-11-09
Attending: PODIATRIST
Payer: COMMERCIAL

## 2018-11-09 ENCOUNTER — HOSPITAL ENCOUNTER (OUTPATIENT)
Age: 68
Setting detail: OUTPATIENT SURGERY
Discharge: HOME | End: 2018-11-09
Attending: PODIATRIST | Admitting: PODIATRIST
Payer: COMMERCIAL

## 2018-11-09 ENCOUNTER — ANESTHESIA (OUTPATIENT)
Dept: OPERATING ROOM | Age: 68
End: 2018-11-09
Payer: COMMERCIAL

## 2018-11-09 VITALS
SYSTOLIC BLOOD PRESSURE: 158 MMHG | TEMPERATURE: 97.2 F | WEIGHT: 152 LBS | HEART RATE: 52 BPM | HEIGHT: 68 IN | RESPIRATION RATE: 17 BRPM | DIASTOLIC BLOOD PRESSURE: 99 MMHG | BODY MASS INDEX: 23.04 KG/M2 | OXYGEN SATURATION: 100 %

## 2018-11-09 VITALS
RESPIRATION RATE: 14 BRPM | SYSTOLIC BLOOD PRESSURE: 120 MMHG | OXYGEN SATURATION: 100 % | DIASTOLIC BLOOD PRESSURE: 76 MMHG

## 2018-11-09 DIAGNOSIS — Z98.890 POST-OPERATIVE STATE: Primary | ICD-10-CM

## 2018-11-09 LAB
ABSOLUTE EOS #: 0.3 K/UL (ref 0–0.4)
ABSOLUTE IMMATURE GRANULOCYTE: ABNORMAL K/UL (ref 0–0.3)
ABSOLUTE LYMPH #: 0.9 K/UL (ref 1–4.8)
ABSOLUTE MONO #: 0.3 K/UL (ref 0.2–0.8)
AMPHETAMINE SCREEN URINE: NEGATIVE
ANION GAP SERPL CALCULATED.3IONS-SCNC: 12 MMOL/L (ref 9–17)
BARBITURATE SCREEN URINE: NEGATIVE
BASOPHILS # BLD: 1 % (ref 0–2)
BASOPHILS ABSOLUTE: 0 K/UL (ref 0–0.2)
BENZODIAZEPINE SCREEN, URINE: NEGATIVE
BUN BLDV-MCNC: 14 MG/DL (ref 8–23)
BUPRENORPHINE URINE: ABNORMAL
CANNABINOID SCREEN URINE: NEGATIVE
CHLORIDE BLD-SCNC: 106 MMOL/L (ref 98–107)
CO2: 24 MMOL/L (ref 20–31)
COCAINE METABOLITE, URINE: POSITIVE
CREAT SERPL-MCNC: 1.17 MG/DL (ref 0.7–1.2)
DIFFERENTIAL TYPE: ABNORMAL
EKG ATRIAL RATE: 55 BPM
EKG P AXIS: 19 DEGREES
EKG P-R INTERVAL: 164 MS
EKG Q-T INTERVAL: 456 MS
EKG QRS DURATION: 98 MS
EKG QTC CALCULATION (BAZETT): 436 MS
EKG R AXIS: -34 DEGREES
EKG T AXIS: 41 DEGREES
EKG VENTRICULAR RATE: 55 BPM
EOSINOPHILS RELATIVE PERCENT: 9 % (ref 1–4)
GFR AFRICAN AMERICAN: >60 ML/MIN
GFR NON-AFRICAN AMERICAN: >60 ML/MIN
GFR SERPL CREATININE-BSD FRML MDRD: NORMAL ML/MIN/{1.73_M2}
GFR SERPL CREATININE-BSD FRML MDRD: NORMAL ML/MIN/{1.73_M2}
HCT VFR BLD CALC: 35.9 % (ref 41–53)
HEMOGLOBIN: 12 G/DL (ref 13.5–17.5)
IMMATURE GRANULOCYTES: ABNORMAL %
LYMPHOCYTES # BLD: 24 % (ref 24–44)
MCH RBC QN AUTO: 26.9 PG (ref 26–34)
MCHC RBC AUTO-ENTMCNC: 33.4 G/DL (ref 31–37)
MCV RBC AUTO: 80.7 FL (ref 80–100)
MDMA URINE: ABNORMAL
METHADONE SCREEN, URINE: NEGATIVE
METHAMPHETAMINE, URINE: ABNORMAL
MONOCYTES # BLD: 8 % (ref 1–7)
NRBC AUTOMATED: ABNORMAL PER 100 WBC
OPIATES, URINE: NEGATIVE
OXYCODONE SCREEN URINE: NEGATIVE
PDW BLD-RTO: 14.8 % (ref 11.5–14.5)
PHENCYCLIDINE, URINE: NEGATIVE
PLATELET # BLD: 279 K/UL (ref 130–400)
PLATELET ESTIMATE: ABNORMAL
PMV BLD AUTO: 9.2 FL (ref 6–12)
POTASSIUM SERPL-SCNC: 3.7 MMOL/L (ref 3.7–5.3)
PROPOXYPHENE, URINE: ABNORMAL
RBC # BLD: 4.45 M/UL (ref 4.5–5.9)
RBC # BLD: ABNORMAL 10*6/UL
SEG NEUTROPHILS: 58 % (ref 36–66)
SEGMENTED NEUTROPHILS ABSOLUTE COUNT: 2.1 K/UL (ref 1.8–7.7)
SODIUM BLD-SCNC: 142 MMOL/L (ref 135–144)
TEST INFORMATION: ABNORMAL
TRICYCLIC ANTIDEPRESSANTS, UR: ABNORMAL
WBC # BLD: 3.7 K/UL (ref 3.5–11)
WBC # BLD: ABNORMAL 10*3/UL

## 2018-11-09 PROCEDURE — 80051 ELECTROLYTE PANEL: CPT

## 2018-11-09 PROCEDURE — 7100000010 HC PHASE II RECOVERY - FIRST 15 MIN: Performed by: PODIATRIST

## 2018-11-09 PROCEDURE — 7100000001 HC PACU RECOVERY - ADDTL 15 MIN: Performed by: PODIATRIST

## 2018-11-09 PROCEDURE — C1776 JOINT DEVICE (IMPLANTABLE): HCPCS | Performed by: PODIATRIST

## 2018-11-09 PROCEDURE — 80307 DRUG TEST PRSMV CHEM ANLYZR: CPT

## 2018-11-09 PROCEDURE — 73630 X-RAY EXAM OF FOOT: CPT

## 2018-11-09 PROCEDURE — 6360000002 HC RX W HCPCS: Performed by: NURSE ANESTHETIST, CERTIFIED REGISTERED

## 2018-11-09 PROCEDURE — 7100000011 HC PHASE II RECOVERY - ADDTL 15 MIN: Performed by: PODIATRIST

## 2018-11-09 PROCEDURE — 93005 ELECTROCARDIOGRAM TRACING: CPT

## 2018-11-09 PROCEDURE — 85025 COMPLETE CBC W/AUTO DIFF WBC: CPT

## 2018-11-09 PROCEDURE — 6360000002 HC RX W HCPCS: Performed by: ANESTHESIOLOGY

## 2018-11-09 PROCEDURE — 3600000003 HC SURGERY LEVEL 3 BASE: Performed by: PODIATRIST

## 2018-11-09 PROCEDURE — 82565 ASSAY OF CREATININE: CPT

## 2018-11-09 PROCEDURE — 2500000003 HC RX 250 WO HCPCS: Performed by: PODIATRIST

## 2018-11-09 PROCEDURE — 2720000010 HC SURG SUPPLY STERILE: Performed by: PODIATRIST

## 2018-11-09 PROCEDURE — C1713 ANCHOR/SCREW BN/BN,TIS/BN: HCPCS | Performed by: PODIATRIST

## 2018-11-09 PROCEDURE — 84520 ASSAY OF UREA NITROGEN: CPT

## 2018-11-09 PROCEDURE — 3600000013 HC SURGERY LEVEL 3 ADDTL 15MIN: Performed by: PODIATRIST

## 2018-11-09 PROCEDURE — 6360000002 HC RX W HCPCS: Performed by: PODIATRIST

## 2018-11-09 PROCEDURE — 76942 ECHO GUIDE FOR BIOPSY: CPT | Performed by: ANESTHESIOLOGY

## 2018-11-09 PROCEDURE — 3700000001 HC ADD 15 MINUTES (ANESTHESIA): Performed by: PODIATRIST

## 2018-11-09 PROCEDURE — 73620 X-RAY EXAM OF FOOT: CPT

## 2018-11-09 PROCEDURE — 2709999900 HC NON-CHARGEABLE SUPPLY: Performed by: PODIATRIST

## 2018-11-09 PROCEDURE — 2500000003 HC RX 250 WO HCPCS: Performed by: ANESTHESIOLOGY

## 2018-11-09 PROCEDURE — 7100000000 HC PACU RECOVERY - FIRST 15 MIN: Performed by: PODIATRIST

## 2018-11-09 PROCEDURE — 2580000003 HC RX 258: Performed by: ANESTHESIOLOGY

## 2018-11-09 PROCEDURE — 3700000000 HC ANESTHESIA ATTENDED CARE: Performed by: PODIATRIST

## 2018-11-09 PROCEDURE — 3209999900 FLUORO FOR SURGICAL PROCEDURES

## 2018-11-09 DEVICE — IMPLANTABLE DEVICE
Type: IMPLANTABLE DEVICE | Site: FOOT | Status: FUNCTIONAL
Brand: ORTHOLOC 3DI

## 2018-11-09 RX ORDER — OXYCODONE AND ACETAMINOPHEN 10; 325 MG/1; MG/1
1 TABLET ORAL EVERY 6 HOURS PRN
Qty: 28 TABLET | Refills: 0 | Status: CANCELLED | OUTPATIENT
Start: 2018-11-09 | End: 2018-11-16

## 2018-11-09 RX ORDER — MIDAZOLAM HYDROCHLORIDE 1 MG/ML
2 INJECTION INTRAMUSCULAR; INTRAVENOUS ONCE
Status: DISCONTINUED | OUTPATIENT
Start: 2018-11-09 | End: 2018-11-09 | Stop reason: HOSPADM

## 2018-11-09 RX ORDER — DOXYCYCLINE HYCLATE 100 MG/1
100 CAPSULE ORAL 2 TIMES DAILY
Qty: 10 CAPSULE | Refills: 0 | Status: SHIPPED | OUTPATIENT
Start: 2018-11-09 | End: 2018-11-14

## 2018-11-09 RX ORDER — LABETALOL HYDROCHLORIDE 5 MG/ML
5 INJECTION, SOLUTION INTRAVENOUS EVERY 10 MIN PRN
Status: DISCONTINUED | OUTPATIENT
Start: 2018-11-09 | End: 2018-11-09 | Stop reason: HOSPADM

## 2018-11-09 RX ORDER — LIDOCAINE HYDROCHLORIDE 10 MG/ML
1 INJECTION, SOLUTION EPIDURAL; INFILTRATION; INTRACAUDAL; PERINEURAL
Status: DISCONTINUED | OUTPATIENT
Start: 2018-11-10 | End: 2018-11-09 | Stop reason: HOSPADM

## 2018-11-09 RX ORDER — LIDOCAINE HYDROCHLORIDE 10 MG/ML
INJECTION, SOLUTION INFILTRATION; PERINEURAL PRN
Status: DISCONTINUED | OUTPATIENT
Start: 2018-11-09 | End: 2018-11-09 | Stop reason: SDUPTHER

## 2018-11-09 RX ORDER — ROPIVACAINE HYDROCHLORIDE 5 MG/ML
INJECTION, SOLUTION EPIDURAL; INFILTRATION; PERINEURAL PRN
Status: DISCONTINUED | OUTPATIENT
Start: 2018-11-09 | End: 2018-11-09 | Stop reason: SDUPTHER

## 2018-11-09 RX ORDER — PROPOFOL 10 MG/ML
INJECTION, EMULSION INTRAVENOUS CONTINUOUS PRN
Status: DISCONTINUED | OUTPATIENT
Start: 2018-11-09 | End: 2018-11-09 | Stop reason: SDUPTHER

## 2018-11-09 RX ORDER — ONDANSETRON 2 MG/ML
4 INJECTION INTRAMUSCULAR; INTRAVENOUS
Status: DISCONTINUED | OUTPATIENT
Start: 2018-11-09 | End: 2018-11-09 | Stop reason: HOSPADM

## 2018-11-09 RX ORDER — HYDRALAZINE HYDROCHLORIDE 20 MG/ML
INJECTION INTRAMUSCULAR; INTRAVENOUS PRN
Status: DISCONTINUED | OUTPATIENT
Start: 2018-11-09 | End: 2018-11-09 | Stop reason: SDUPTHER

## 2018-11-09 RX ORDER — OXYCODONE HYDROCHLORIDE AND ACETAMINOPHEN 5; 325 MG/1; MG/1
1 TABLET ORAL EVERY 6 HOURS PRN
Qty: 28 TABLET | Refills: 0 | Status: SHIPPED | OUTPATIENT
Start: 2018-11-09 | End: 2018-11-16

## 2018-11-09 RX ORDER — HYDRALAZINE HYDROCHLORIDE 20 MG/ML
5 INJECTION INTRAMUSCULAR; INTRAVENOUS EVERY 10 MIN PRN
Status: DISCONTINUED | OUTPATIENT
Start: 2018-11-09 | End: 2018-11-09 | Stop reason: HOSPADM

## 2018-11-09 RX ORDER — CEFAZOLIN SODIUM 2 G/50ML
2 SOLUTION INTRAVENOUS ONCE
Status: COMPLETED | OUTPATIENT
Start: 2018-11-09 | End: 2018-11-09

## 2018-11-09 RX ORDER — HYDROMORPHONE HCL 110MG/55ML
0.5 PATIENT CONTROLLED ANALGESIA SYRINGE INTRAVENOUS EVERY 5 MIN PRN
Status: DISCONTINUED | OUTPATIENT
Start: 2018-11-09 | End: 2018-11-09 | Stop reason: HOSPADM

## 2018-11-09 RX ORDER — FENTANYL CITRATE 50 UG/ML
25 INJECTION, SOLUTION INTRAMUSCULAR; INTRAVENOUS EVERY 5 MIN PRN
Status: DISCONTINUED | OUTPATIENT
Start: 2018-11-09 | End: 2018-11-09 | Stop reason: HOSPADM

## 2018-11-09 RX ORDER — PROMETHAZINE HYDROCHLORIDE 25 MG/ML
6.25 INJECTION, SOLUTION INTRAMUSCULAR; INTRAVENOUS
Status: DISCONTINUED | OUTPATIENT
Start: 2018-11-09 | End: 2018-11-09 | Stop reason: HOSPADM

## 2018-11-09 RX ORDER — SODIUM CHLORIDE, SODIUM LACTATE, POTASSIUM CHLORIDE, CALCIUM CHLORIDE 600; 310; 30; 20 MG/100ML; MG/100ML; MG/100ML; MG/100ML
INJECTION, SOLUTION INTRAVENOUS CONTINUOUS
Status: DISCONTINUED | OUTPATIENT
Start: 2018-11-10 | End: 2018-11-09 | Stop reason: HOSPADM

## 2018-11-09 RX ADMIN — SODIUM CHLORIDE, POTASSIUM CHLORIDE, SODIUM LACTATE AND CALCIUM CHLORIDE: 600; 310; 30; 20 INJECTION, SOLUTION INTRAVENOUS at 07:41

## 2018-11-09 RX ADMIN — ROPIVACAINE HYDROCHLORIDE 10 ML: 5 INJECTION, SOLUTION EPIDURAL; INFILTRATION; PERINEURAL at 07:32

## 2018-11-09 RX ADMIN — ROPIVACAINE HYDROCHLORIDE 30 ML: 5 INJECTION, SOLUTION EPIDURAL; INFILTRATION; PERINEURAL at 07:30

## 2018-11-09 RX ADMIN — HYDRALAZINE HYDROCHLORIDE 5 MG: 20 INJECTION INTRAMUSCULAR; INTRAVENOUS at 08:35

## 2018-11-09 RX ADMIN — SODIUM CHLORIDE, POTASSIUM CHLORIDE, SODIUM LACTATE AND CALCIUM CHLORIDE: 600; 310; 30; 20 INJECTION, SOLUTION INTRAVENOUS at 06:24

## 2018-11-09 RX ADMIN — LIDOCAINE HYDROCHLORIDE 3 ML: 10 INJECTION, SOLUTION INFILTRATION; PERINEURAL at 07:30

## 2018-11-09 RX ADMIN — PROPOFOL 25 MCG/KG/MIN: 10 INJECTION, EMULSION INTRAVENOUS at 07:58

## 2018-11-09 RX ADMIN — HYDRALAZINE HYDROCHLORIDE 5 MG: 20 INJECTION INTRAMUSCULAR; INTRAVENOUS at 08:19

## 2018-11-09 RX ADMIN — CEFAZOLIN SODIUM 2 G: 2 SOLUTION INTRAVENOUS at 07:47

## 2018-11-09 ASSESSMENT — PULMONARY FUNCTION TESTS
PIF_VALUE: 1
PIF_VALUE: 0
PIF_VALUE: 1
PIF_VALUE: 0
PIF_VALUE: 1
PIF_VALUE: 0
PIF_VALUE: 1
PIF_VALUE: 0
PIF_VALUE: 1

## 2018-11-09 ASSESSMENT — PAIN - FUNCTIONAL ASSESSMENT: PAIN_FUNCTIONAL_ASSESSMENT: 0-10
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
## 2018-11-10 NOTE — OP NOTE
right foot. He may heel  touch weight bear with CAM walker. He was given prescription for  postoperative antibiotics as well as analgesic medication. The patient  will be seen within one week's time in our private office.         Claire Bethea    D: 11/09/2018 9:25:47       T: 11/09/2018 13:34:16     [email or web address]  Job#: [id number]     Doc#: [id number]    CC:

## 2019-01-11 ENCOUNTER — HOSPITAL ENCOUNTER (OUTPATIENT)
Age: 69
Discharge: HOME | End: 2019-01-11
Payer: COMMERCIAL

## 2019-01-11 LAB
C-REACTIVE PROTEIN: 3.2 MG/L (ref 0–5)
SEDIMENTATION RATE, ERYTHROCYTE: 36 MM (ref 0–10)
VITAMIN D 25-HYDROXY: 11.9 NG/ML (ref 30–100)

## 2019-01-11 PROCEDURE — 86140 C-REACTIVE PROTEIN: CPT

## 2019-01-11 PROCEDURE — 82306 VITAMIN D 25 HYDROXY: CPT

## 2019-01-11 PROCEDURE — 85651 RBC SED RATE NONAUTOMATED: CPT

## 2019-01-11 PROCEDURE — 36415 COLL VENOUS BLD VENIPUNCTURE: CPT

## 2019-01-19 ENCOUNTER — HOSPITAL ENCOUNTER (OUTPATIENT)
Age: 69
Discharge: HOME | End: 2019-01-19
Payer: COMMERCIAL

## 2019-01-19 LAB
ABSOLUTE EOS #: 0.31 K/UL (ref 0–0.44)
ABSOLUTE IMMATURE GRANULOCYTE: <0.03 K/UL (ref 0–0.3)
ABSOLUTE LYMPH #: 1.47 K/UL (ref 1.1–3.7)
ABSOLUTE MONO #: 0.43 K/UL (ref 0.1–1.2)
ALBUMIN SERPL-MCNC: 3.8 G/DL (ref 3.5–5.2)
ALBUMIN/GLOBULIN RATIO: 1 (ref 1–2.5)
ALP BLD-CCNC: 71 U/L (ref 40–129)
ALT SERPL-CCNC: 9 U/L (ref 5–41)
ANION GAP SERPL CALCULATED.3IONS-SCNC: 18 MMOL/L (ref 9–17)
AST SERPL-CCNC: 18 U/L
BASOPHILS # BLD: 1 % (ref 0–2)
BASOPHILS ABSOLUTE: 0.08 K/UL (ref 0–0.2)
BILIRUB SERPL-MCNC: 0.25 MG/DL (ref 0.3–1.2)
BUN BLDV-MCNC: 23 MG/DL (ref 8–23)
BUN/CREAT BLD: ABNORMAL (ref 9–20)
CALCIUM SERPL-MCNC: 8.7 MG/DL (ref 8.6–10.4)
CHLORIDE BLD-SCNC: 104 MMOL/L (ref 98–107)
CHOLESTEROL, FASTING: 159 MG/DL
CHOLESTEROL/HDL RATIO: 3.5
CO2: 24 MMOL/L (ref 20–31)
CREAT SERPL-MCNC: 2.52 MG/DL (ref 0.7–1.2)
CREATININE URINE: 109 MG/DL (ref 39–259)
DIFFERENTIAL TYPE: ABNORMAL
EOSINOPHILS RELATIVE PERCENT: 5 % (ref 1–4)
GFR AFRICAN AMERICAN: 31 ML/MIN
GFR NON-AFRICAN AMERICAN: 26 ML/MIN
GFR SERPL CREATININE-BSD FRML MDRD: ABNORMAL ML/MIN/{1.73_M2}
GFR SERPL CREATININE-BSD FRML MDRD: ABNORMAL ML/MIN/{1.73_M2}
GLUCOSE BLD-MCNC: 93 MG/DL (ref 70–99)
HCT VFR BLD CALC: 35.5 % (ref 40.7–50.3)
HDLC SERPL-MCNC: 46 MG/DL
HEMOGLOBIN: 10.8 G/DL (ref 13–17)
IMMATURE GRANULOCYTES: 0 %
LDL CHOLESTEROL: 86 MG/DL (ref 0–130)
LYMPHOCYTES # BLD: 24 % (ref 24–43)
MCH RBC QN AUTO: 25.1 PG (ref 25.2–33.5)
MCHC RBC AUTO-ENTMCNC: 30.4 G/DL (ref 28.4–34.8)
MCV RBC AUTO: 82.4 FL (ref 82.6–102.9)
MICROALBUMIN/CREAT 24H UR: 118 MG/L
MICROALBUMIN/CREAT UR-RTO: 108 MCG/MG CREAT
MONOCYTES # BLD: 7 % (ref 3–12)
NRBC AUTOMATED: 0 PER 100 WBC
PDW BLD-RTO: 14.1 % (ref 11.8–14.4)
PLATELET # BLD: 284 K/UL (ref 138–453)
PLATELET ESTIMATE: ABNORMAL
PMV BLD AUTO: 12.4 FL (ref 8.1–13.5)
POTASSIUM SERPL-SCNC: 3.4 MMOL/L (ref 3.7–5.3)
RBC # BLD: 4.31 M/UL (ref 4.21–5.77)
RBC # BLD: ABNORMAL 10*6/UL
SEG NEUTROPHILS: 63 % (ref 36–65)
SEGMENTED NEUTROPHILS ABSOLUTE COUNT: 3.94 K/UL (ref 1.5–8.1)
SODIUM BLD-SCNC: 146 MMOL/L (ref 135–144)
THYROXINE, FREE: 0.76 NG/DL (ref 0.93–1.7)
TOTAL PROTEIN: 7.7 G/DL (ref 6.4–8.3)
TRIGLYCERIDE, FASTING: 137 MG/DL
TSH SERPL DL<=0.05 MIU/L-ACNC: 19.54 MIU/L (ref 0.3–5)
VLDLC SERPL CALC-MCNC: NORMAL MG/DL (ref 1–30)
WBC # BLD: 6.2 K/UL (ref 3.5–11.3)
WBC # BLD: ABNORMAL 10*3/UL

## 2019-01-19 PROCEDURE — 82570 ASSAY OF URINE CREATININE: CPT

## 2019-01-19 PROCEDURE — 80053 COMPREHEN METABOLIC PANEL: CPT

## 2019-01-19 PROCEDURE — 36415 COLL VENOUS BLD VENIPUNCTURE: CPT

## 2019-01-19 PROCEDURE — 85025 COMPLETE CBC W/AUTO DIFF WBC: CPT

## 2019-01-19 PROCEDURE — 84439 ASSAY OF FREE THYROXINE: CPT

## 2019-01-19 PROCEDURE — 80061 LIPID PANEL: CPT

## 2019-01-19 PROCEDURE — 82043 UR ALBUMIN QUANTITATIVE: CPT

## 2019-01-19 PROCEDURE — 84443 ASSAY THYROID STIM HORMONE: CPT

## 2019-02-15 ENCOUNTER — HOSPITAL ENCOUNTER (OUTPATIENT)
Age: 69
Discharge: HOME | End: 2019-02-15
Payer: COMMERCIAL

## 2019-02-15 LAB
-: NORMAL
AMORPHOUS: NORMAL
ANION GAP SERPL CALCULATED.3IONS-SCNC: 13 MMOL/L (ref 9–17)
BACTERIA: NORMAL
BILIRUBIN URINE: NEGATIVE
BUN BLDV-MCNC: 24 MG/DL (ref 8–23)
BUN/CREAT BLD: ABNORMAL (ref 9–20)
CALCIUM IONIZED: 1.23 MMOL/L (ref 1.13–1.33)
CALCIUM SERPL-MCNC: 8.3 MG/DL (ref 8.6–10.4)
CASTS UA: NORMAL /LPF (ref 0–8)
CHLORIDE BLD-SCNC: 109 MMOL/L (ref 98–107)
CO2: 21 MMOL/L (ref 20–31)
COLOR: YELLOW
COMMENT UA: ABNORMAL
CREAT SERPL-MCNC: 2.27 MG/DL (ref 0.7–1.2)
CREATININE URINE: 110.6 MG/DL (ref 39–259)
CRYSTALS, UA: NORMAL /HPF
EPITHELIAL CELLS UA: NORMAL /HPF (ref 0–5)
GFR AFRICAN AMERICAN: 35 ML/MIN
GFR NON-AFRICAN AMERICAN: 29 ML/MIN
GFR SERPL CREATININE-BSD FRML MDRD: ABNORMAL ML/MIN/{1.73_M2}
GFR SERPL CREATININE-BSD FRML MDRD: ABNORMAL ML/MIN/{1.73_M2}
GLUCOSE BLD-MCNC: 132 MG/DL (ref 70–99)
GLUCOSE URINE: NEGATIVE
HCT VFR BLD CALC: 35.7 % (ref 40.7–50.3)
HEMOGLOBIN: 11.1 G/DL (ref 13–17)
KETONES, URINE: NEGATIVE
LEUKOCYTE ESTERASE, URINE: NEGATIVE
MAGNESIUM: 2.1 MG/DL (ref 1.6–2.6)
MCH RBC QN AUTO: 26.6 PG (ref 25.2–33.5)
MCHC RBC AUTO-ENTMCNC: 31.1 G/DL (ref 28.4–34.8)
MCV RBC AUTO: 85.6 FL (ref 82.6–102.9)
MICROALBUMIN/CREAT 24H UR: 102 MG/L
MICROALBUMIN/CREAT UR-RTO: 92 MCG/MG CREAT
MUCUS: NORMAL
NITRITE, URINE: NEGATIVE
NRBC AUTOMATED: 0 PER 100 WBC
OTHER OBSERVATIONS UA: NORMAL
PDW BLD-RTO: 14.9 % (ref 11.8–14.4)
PH UA: 5 (ref 5–8)
PHOSPHORUS: 2.9 MG/DL (ref 2.5–4.5)
PLATELET # BLD: 242 K/UL (ref 138–453)
PMV BLD AUTO: 12.5 FL (ref 8.1–13.5)
POTASSIUM SERPL-SCNC: 3.6 MMOL/L (ref 3.7–5.3)
PROTEIN UA: ABNORMAL
PTH INTACT: 82.54 PG/ML (ref 15–65)
RBC # BLD: 4.17 M/UL (ref 4.21–5.77)
RBC UA: NORMAL /HPF (ref 0–4)
RENAL EPITHELIAL, UA: NORMAL /HPF
SODIUM BLD-SCNC: 143 MMOL/L (ref 135–144)
SPECIFIC GRAVITY UA: 1.02 (ref 1–1.03)
TRICHOMONAS: NORMAL
TURBIDITY: CLEAR
URINE HGB: ABNORMAL
UROBILINOGEN, URINE: NORMAL
VITAMIN D 25-HYDROXY: 11.7 NG/ML (ref 30–100)
WBC # BLD: 4.1 K/UL (ref 3.5–11.3)
WBC UA: NORMAL /HPF (ref 0–5)
YEAST: NORMAL

## 2019-02-15 PROCEDURE — 81001 URINALYSIS AUTO W/SCOPE: CPT

## 2019-02-15 PROCEDURE — 83735 ASSAY OF MAGNESIUM: CPT

## 2019-02-15 PROCEDURE — 82043 UR ALBUMIN QUANTITATIVE: CPT

## 2019-02-15 PROCEDURE — 85027 COMPLETE CBC AUTOMATED: CPT

## 2019-02-15 PROCEDURE — 82570 ASSAY OF URINE CREATININE: CPT

## 2019-02-15 PROCEDURE — 36415 COLL VENOUS BLD VENIPUNCTURE: CPT

## 2019-02-15 PROCEDURE — 83970 ASSAY OF PARATHORMONE: CPT

## 2019-02-15 PROCEDURE — 82306 VITAMIN D 25 HYDROXY: CPT

## 2019-02-15 PROCEDURE — 84100 ASSAY OF PHOSPHORUS: CPT

## 2019-02-15 PROCEDURE — 82330 ASSAY OF CALCIUM: CPT

## 2019-02-15 PROCEDURE — 80048 BASIC METABOLIC PNL TOTAL CA: CPT

## 2019-03-01 ENCOUNTER — HOSPITAL ENCOUNTER (OUTPATIENT)
Age: 69
Discharge: HOME | End: 2019-03-01
Payer: COMMERCIAL

## 2019-03-01 LAB
-: NORMAL
ABSOLUTE EOS #: 0.27 K/UL (ref 0–0.44)
ABSOLUTE IMMATURE GRANULOCYTE: <0.03 K/UL (ref 0–0.3)
ABSOLUTE LYMPH #: 1.02 K/UL (ref 1.1–3.7)
ABSOLUTE MONO #: 0.31 K/UL (ref 0.1–1.2)
AMORPHOUS: NORMAL
ANION GAP SERPL CALCULATED.3IONS-SCNC: 14 MMOL/L (ref 9–17)
BACTERIA: NORMAL
BASOPHILS # BLD: 1 % (ref 0–2)
BASOPHILS ABSOLUTE: 0.04 K/UL (ref 0–0.2)
BILIRUBIN URINE: NEGATIVE
BUN BLDV-MCNC: 22 MG/DL (ref 8–23)
BUN/CREAT BLD: ABNORMAL (ref 9–20)
CALCIUM SERPL-MCNC: 8.4 MG/DL (ref 8.6–10.4)
CASTS UA: NORMAL /LPF (ref 0–8)
CHLORIDE BLD-SCNC: 109 MMOL/L (ref 98–107)
CO2: 24 MMOL/L (ref 20–31)
COLOR: YELLOW
COMMENT UA: ABNORMAL
COMPLEMENT C3: 78 MG/DL (ref 90–180)
COMPLEMENT C4: 22 MG/DL (ref 10–40)
CREAT SERPL-MCNC: 2.09 MG/DL (ref 0.7–1.2)
CREATININE URINE: 170.9 MG/DL (ref 39–259)
CRYSTALS, UA: NORMAL /HPF
DIFFERENTIAL TYPE: ABNORMAL
EOSINOPHILS RELATIVE PERCENT: 8 % (ref 1–4)
EPITHELIAL CELLS UA: NORMAL /HPF (ref 0–5)
FOLATE: 14.2 NG/ML
GFR AFRICAN AMERICAN: 38 ML/MIN
GFR NON-AFRICAN AMERICAN: 32 ML/MIN
GFR SERPL CREATININE-BSD FRML MDRD: ABNORMAL ML/MIN/{1.73_M2}
GFR SERPL CREATININE-BSD FRML MDRD: ABNORMAL ML/MIN/{1.73_M2}
GLUCOSE BLD-MCNC: 86 MG/DL (ref 70–99)
GLUCOSE URINE: NEGATIVE
HCT VFR BLD CALC: 34.6 % (ref 40.7–50.3)
HEMOGLOBIN: 10.6 G/DL (ref 13–17)
IMMATURE GRANULOCYTES: 0 %
IRON SATURATION: 13 % (ref 20–55)
IRON: 37 UG/DL (ref 59–158)
KETONES, URINE: NEGATIVE
LEUKOCYTE ESTERASE, URINE: NEGATIVE
LYMPHOCYTES # BLD: 31 % (ref 24–43)
MAGNESIUM: 2.2 MG/DL (ref 1.6–2.6)
MCH RBC QN AUTO: 26 PG (ref 25.2–33.5)
MCHC RBC AUTO-ENTMCNC: 30.6 G/DL (ref 28.4–34.8)
MCV RBC AUTO: 84.8 FL (ref 82.6–102.9)
MONOCYTES # BLD: 10 % (ref 3–12)
MUCUS: NORMAL
NITRITE, URINE: NEGATIVE
NRBC AUTOMATED: 0 PER 100 WBC
OTHER OBSERVATIONS UA: NORMAL
PDW BLD-RTO: 14.8 % (ref 11.8–14.4)
PH UA: 5 (ref 5–8)
PHOSPHORUS: 4 MG/DL (ref 2.5–4.5)
PLATELET # BLD: 242 K/UL (ref 138–453)
PLATELET ESTIMATE: ABNORMAL
PMV BLD AUTO: 12.3 FL (ref 8.1–13.5)
POTASSIUM SERPL-SCNC: 3.8 MMOL/L (ref 3.7–5.3)
PROTEIN UA: ABNORMAL
RBC # BLD: 4.08 M/UL (ref 4.21–5.77)
RBC # BLD: ABNORMAL 10*6/UL
RBC UA: NORMAL /HPF (ref 0–4)
RENAL EPITHELIAL, UA: NORMAL /HPF
SEG NEUTROPHILS: 50 % (ref 36–65)
SEGMENTED NEUTROPHILS ABSOLUTE COUNT: 1.64 K/UL (ref 1.5–8.1)
SODIUM BLD-SCNC: 147 MMOL/L (ref 135–144)
SPECIFIC GRAVITY UA: 1.02 (ref 1–1.03)
TOTAL IRON BINDING CAPACITY: 284 UG/DL (ref 250–450)
TOTAL PROTEIN, URINE: 76 MG/DL
TRICHOMONAS: NORMAL
TURBIDITY: CLEAR
UNSATURATED IRON BINDING CAPACITY: 247 UG/DL (ref 112–347)
URINE HGB: ABNORMAL
UROBILINOGEN, URINE: NORMAL
VITAMIN B-12: 294 PG/ML (ref 232–1245)
VITAMIN D 25-HYDROXY: 15.1 NG/ML (ref 30–100)
WBC # BLD: 3.3 K/UL (ref 3.5–11.3)
WBC # BLD: ABNORMAL 10*3/UL
WBC UA: NORMAL /HPF (ref 0–5)
YEAST: NORMAL

## 2019-03-01 PROCEDURE — 84155 ASSAY OF PROTEIN SERUM: CPT

## 2019-03-01 PROCEDURE — 84156 ASSAY OF PROTEIN URINE: CPT

## 2019-03-01 PROCEDURE — 36415 COLL VENOUS BLD VENIPUNCTURE: CPT

## 2019-03-01 PROCEDURE — 84165 PROTEIN E-PHORESIS SERUM: CPT

## 2019-03-01 PROCEDURE — 83735 ASSAY OF MAGNESIUM: CPT

## 2019-03-01 PROCEDURE — 82306 VITAMIN D 25 HYDROXY: CPT

## 2019-03-01 PROCEDURE — 86160 COMPLEMENT ANTIGEN: CPT

## 2019-03-01 PROCEDURE — 82746 ASSAY OF FOLIC ACID SERUM: CPT

## 2019-03-01 PROCEDURE — 81001 URINALYSIS AUTO W/SCOPE: CPT

## 2019-03-01 PROCEDURE — 82607 VITAMIN B-12: CPT

## 2019-03-01 PROCEDURE — 82728 ASSAY OF FERRITIN: CPT

## 2019-03-01 PROCEDURE — 83550 IRON BINDING TEST: CPT

## 2019-03-01 PROCEDURE — 82570 ASSAY OF URINE CREATININE: CPT

## 2019-03-01 PROCEDURE — 83540 ASSAY OF IRON: CPT

## 2019-03-01 PROCEDURE — 84100 ASSAY OF PHOSPHORUS: CPT

## 2019-03-01 PROCEDURE — 85025 COMPLETE CBC W/AUTO DIFF WBC: CPT

## 2019-03-01 PROCEDURE — 86038 ANTINUCLEAR ANTIBODIES: CPT

## 2019-03-01 PROCEDURE — 80048 BASIC METABOLIC PNL TOTAL CA: CPT

## 2019-03-02 LAB — FERRITIN: 70 UG/L (ref 30–400)

## 2019-03-04 LAB
ALBUMIN (CALCULATED): 4 G/DL (ref 3.2–5.2)
ALBUMIN PERCENT: 58 % (ref 45–65)
ALPHA 1 PERCENT: 3 % (ref 3–6)
ALPHA 2 PERCENT: 9 % (ref 6–13)
ALPHA-1-GLOBULIN: 0.2 G/DL (ref 0.1–0.4)
ALPHA-2-GLOBULIN: 0.7 G/DL (ref 0.5–0.9)
ANTI-NUCLEAR ANTIBODY (ANA): NEGATIVE
BETA GLOBULIN: 0.7 G/DL (ref 0.5–1.1)
BETA PERCENT: 10 % (ref 11–19)
GAMMA GLOBULIN %: 20 % (ref 9–20)
GAMMA GLOBULIN: 1.4 G/DL (ref 0.5–1.5)
PATHOLOGIST: ABNORMAL
PROTEIN ELECTROPHORESIS, SERUM: ABNORMAL
TOTAL PROT. SUM,%: 100 % (ref 98–102)
TOTAL PROT. SUM: 7 G/DL (ref 6.3–8.2)
TOTAL PROTEIN: 7 G/DL (ref 6.4–8.3)

## 2020-02-14 ENCOUNTER — HOSPITAL ENCOUNTER (INPATIENT)
Age: 70
LOS: 3 days | Discharge: NURSING, REHAB OR LONG-TERM CARE | DRG: 504 | End: 2020-02-18
Attending: EMERGENCY MEDICINE | Admitting: INTERNAL MEDICINE
Payer: COMMERCIAL

## 2020-02-14 ENCOUNTER — APPOINTMENT (OUTPATIENT)
Dept: GENERAL RADIOLOGY | Age: 70
DRG: 504 | End: 2020-02-14
Payer: COMMERCIAL

## 2020-02-14 LAB
ABSOLUTE EOS #: 0.24 K/UL (ref 0–0.44)
ABSOLUTE IMMATURE GRANULOCYTE: 0 K/UL (ref 0–0.3)
ABSOLUTE LYMPH #: 0.63 K/UL (ref 1.1–3.7)
ABSOLUTE MONO #: 0.47 K/UL (ref 0.1–1.2)
ANION GAP SERPL CALCULATED.3IONS-SCNC: 12 MMOL/L (ref 9–17)
BASOPHILS # BLD: 1 % (ref 0–2)
BASOPHILS ABSOLUTE: 0.08 K/UL (ref 0–0.2)
BUN BLDV-MCNC: 22 MG/DL (ref 8–23)
BUN/CREAT BLD: 12 (ref 9–20)
CALCIUM SERPL-MCNC: 8.6 MG/DL (ref 8.6–10.4)
CHLORIDE BLD-SCNC: 99 MMOL/L (ref 98–107)
CO2: 24 MMOL/L (ref 20–31)
CREAT SERPL-MCNC: 1.77 MG/DL (ref 0.7–1.2)
DIFFERENTIAL TYPE: ABNORMAL
EOSINOPHILS RELATIVE PERCENT: 3 % (ref 1–4)
GFR AFRICAN AMERICAN: 46 ML/MIN
GFR NON-AFRICAN AMERICAN: 38 ML/MIN
GFR SERPL CREATININE-BSD FRML MDRD: ABNORMAL ML/MIN/{1.73_M2}
GFR SERPL CREATININE-BSD FRML MDRD: ABNORMAL ML/MIN/{1.73_M2}
GLUCOSE BLD-MCNC: 111 MG/DL (ref 70–99)
HCT VFR BLD CALC: 32 % (ref 40.7–50.3)
HEMOGLOBIN: 10.1 G/DL (ref 13–17)
IMMATURE GRANULOCYTES: 0 %
LYMPHOCYTES # BLD: 8 % (ref 24–43)
MCH RBC QN AUTO: 27.2 PG (ref 25.2–33.5)
MCHC RBC AUTO-ENTMCNC: 31.6 G/DL (ref 28.4–34.8)
MCV RBC AUTO: 86 FL (ref 82.6–102.9)
MONOCYTES # BLD: 6 % (ref 3–12)
NRBC AUTOMATED: 0 PER 100 WBC
PDW BLD-RTO: 12.8 % (ref 11.8–14.4)
PLATELET # BLD: 332 K/UL (ref 138–453)
PLATELET ESTIMATE: ABNORMAL
PMV BLD AUTO: 10.2 FL (ref 8.1–13.5)
POTASSIUM SERPL-SCNC: 3.5 MMOL/L (ref 3.7–5.3)
RBC # BLD: 3.72 M/UL (ref 4.21–5.77)
RBC # BLD: ABNORMAL 10*6/UL
SEDIMENTATION RATE, ERYTHROCYTE: 80 MM (ref 0–15)
SEG NEUTROPHILS: 82 % (ref 36–65)
SEGMENTED NEUTROPHILS ABSOLUTE COUNT: 6.48 K/UL (ref 1.5–8.1)
SODIUM BLD-SCNC: 135 MMOL/L (ref 135–144)
WBC # BLD: 7.9 K/UL (ref 3.5–11.3)
WBC # BLD: ABNORMAL 10*3/UL

## 2020-02-14 PROCEDURE — 6360000002 HC RX W HCPCS: Performed by: EMERGENCY MEDICINE

## 2020-02-14 PROCEDURE — 73630 X-RAY EXAM OF FOOT: CPT

## 2020-02-14 PROCEDURE — 80048 BASIC METABOLIC PNL TOTAL CA: CPT

## 2020-02-14 PROCEDURE — 85651 RBC SED RATE NONAUTOMATED: CPT

## 2020-02-14 PROCEDURE — 85025 COMPLETE CBC W/AUTO DIFF WBC: CPT

## 2020-02-14 PROCEDURE — 6370000000 HC RX 637 (ALT 250 FOR IP): Performed by: STUDENT IN AN ORGANIZED HEALTH CARE EDUCATION/TRAINING PROGRAM

## 2020-02-14 PROCEDURE — 86140 C-REACTIVE PROTEIN: CPT

## 2020-02-14 PROCEDURE — 96375 TX/PRO/DX INJ NEW DRUG ADDON: CPT

## 2020-02-14 PROCEDURE — 99284 EMERGENCY DEPT VISIT MOD MDM: CPT

## 2020-02-14 RX ORDER — DOXYCYCLINE 100 MG/1
100 CAPSULE ORAL EVERY 12 HOURS SCHEDULED
Status: DISCONTINUED | OUTPATIENT
Start: 2020-02-14 | End: 2020-02-16

## 2020-02-14 RX ORDER — CARVEDILOL 6.25 MG/1
6.25 TABLET ORAL 2 TIMES DAILY WITH MEALS
COMMUNITY

## 2020-02-14 RX ORDER — ASCORBIC ACID 500 MG
500 TABLET ORAL DAILY
COMMUNITY

## 2020-02-14 RX ORDER — MORPHINE SULFATE 4 MG/ML
4 INJECTION, SOLUTION INTRAMUSCULAR; INTRAVENOUS ONCE
Status: COMPLETED | OUTPATIENT
Start: 2020-02-15 | End: 2020-02-14

## 2020-02-14 RX ORDER — LEVOTHYROXINE SODIUM 175 UG/1
175 TABLET ORAL DAILY
COMMUNITY

## 2020-02-14 RX ORDER — DOXYCYCLINE 100 MG/1
100 CAPSULE ORAL EVERY 12 HOURS SCHEDULED
Status: DISCONTINUED | OUTPATIENT
Start: 2020-02-14 | End: 2020-02-14

## 2020-02-14 RX ORDER — FERROUS SULFATE 325(65) MG
325 TABLET ORAL DAILY
COMMUNITY
Start: 2020-01-06

## 2020-02-14 RX ORDER — NICOTINE POLACRILEX 4 MG/1
20 GUM, CHEWING ORAL DAILY
Status: ON HOLD | COMMUNITY
Start: 2019-09-09 | End: 2022-07-26

## 2020-02-14 RX ADMIN — DOXYCYCLINE 100 MG: 100 CAPSULE ORAL at 23:56

## 2020-02-14 RX ADMIN — MORPHINE SULFATE 4 MG: 4 INJECTION INTRAVENOUS at 23:59

## 2020-02-14 ASSESSMENT — PAIN SCALES - GENERAL
PAINLEVEL_OUTOF10: 6
PAINLEVEL_OUTOF10: 8

## 2020-02-15 ENCOUNTER — ANESTHESIA EVENT (OUTPATIENT)
Dept: OPERATING ROOM | Age: 70
DRG: 504 | End: 2020-02-15
Payer: COMMERCIAL

## 2020-02-15 ENCOUNTER — ANESTHESIA (OUTPATIENT)
Dept: OPERATING ROOM | Age: 70
DRG: 504 | End: 2020-02-15
Payer: COMMERCIAL

## 2020-02-15 ENCOUNTER — APPOINTMENT (OUTPATIENT)
Dept: GENERAL RADIOLOGY | Age: 70
DRG: 504 | End: 2020-02-15
Payer: COMMERCIAL

## 2020-02-15 VITALS — SYSTOLIC BLOOD PRESSURE: 120 MMHG | OXYGEN SATURATION: 96 % | DIASTOLIC BLOOD PRESSURE: 75 MMHG

## 2020-02-15 PROBLEM — N40.0 BENIGN PROSTATIC HYPERPLASIA WITHOUT LOWER URINARY TRACT SYMPTOMS: Status: ACTIVE | Noted: 2018-10-29

## 2020-02-15 PROBLEM — N18.4 ANEMIA DUE TO STAGE 4 CHRONIC KIDNEY DISEASE (HCC): Status: ACTIVE | Noted: 2019-05-01

## 2020-02-15 PROBLEM — D63.1 ANEMIA DUE TO STAGE 4 CHRONIC KIDNEY DISEASE (HCC): Status: ACTIVE | Noted: 2019-05-01

## 2020-02-15 PROBLEM — D64.9 ANEMIA, NORMOCYTIC NORMOCHROMIC: Status: ACTIVE | Noted: 2020-02-15

## 2020-02-15 PROBLEM — M86.9 OSTEOMYELITIS (HCC): Status: ACTIVE | Noted: 2020-02-15

## 2020-02-15 PROBLEM — N18.30 CKD (CHRONIC KIDNEY DISEASE), STAGE III (HCC): Status: ACTIVE | Noted: 2020-02-15

## 2020-02-15 PROBLEM — E55.9 VITAMIN D DEFICIENCY: Status: ACTIVE | Noted: 2019-05-01

## 2020-02-15 PROBLEM — N18.4 CHRONIC KIDNEY DISEASE (CKD) STAGE G4/A1, SEVERELY DECREASED GLOMERULAR FILTRATION RATE (GFR) BETWEEN 15-29 ML/MIN/1.73 SQUARE METER AND ALBUMINURIA CREATININE RATIO LESS THAN 30 MG/G (HCC): Status: ACTIVE | Noted: 2019-01-30

## 2020-02-15 PROBLEM — R00.1 BRADYCARDIA: Status: ACTIVE | Noted: 2020-02-15

## 2020-02-15 LAB
C-REACTIVE PROTEIN: 32.8 MG/L (ref 0–5)
THYROXINE, FREE: 0.96 NG/DL (ref 0.93–1.7)
TSH SERPL DL<=0.05 MIU/L-ACNC: 0.23 MIU/L (ref 0.3–5)

## 2020-02-15 PROCEDURE — 7100000001 HC PACU RECOVERY - ADDTL 15 MIN: Performed by: PODIATRIST

## 2020-02-15 PROCEDURE — 2580000003 HC RX 258: Performed by: STUDENT IN AN ORGANIZED HEALTH CARE EDUCATION/TRAINING PROGRAM

## 2020-02-15 PROCEDURE — 0Y6V0Z0 DETACHMENT AT RIGHT 4TH TOE, COMPLETE, OPEN APPROACH: ICD-10-PCS | Performed by: PODIATRIST

## 2020-02-15 PROCEDURE — 73630 X-RAY EXAM OF FOOT: CPT

## 2020-02-15 PROCEDURE — 88305 TISSUE EXAM BY PATHOLOGIST: CPT

## 2020-02-15 PROCEDURE — 88311 DECALCIFY TISSUE: CPT

## 2020-02-15 PROCEDURE — 6370000000 HC RX 637 (ALT 250 FOR IP): Performed by: NURSE PRACTITIONER

## 2020-02-15 PROCEDURE — 1200000000 HC SEMI PRIVATE

## 2020-02-15 PROCEDURE — 86403 PARTICLE AGGLUT ANTBDY SCRN: CPT

## 2020-02-15 PROCEDURE — 6370000000 HC RX 637 (ALT 250 FOR IP): Performed by: INTERNAL MEDICINE

## 2020-02-15 PROCEDURE — 6360000002 HC RX W HCPCS: Performed by: ANESTHESIOLOGY

## 2020-02-15 PROCEDURE — 87186 SC STD MICRODIL/AGAR DIL: CPT

## 2020-02-15 PROCEDURE — 6370000000 HC RX 637 (ALT 250 FOR IP): Performed by: STUDENT IN AN ORGANIZED HEALTH CARE EDUCATION/TRAINING PROGRAM

## 2020-02-15 PROCEDURE — 87075 CULTR BACTERIA EXCEPT BLOOD: CPT

## 2020-02-15 PROCEDURE — 2580000003 HC RX 258: Performed by: ANESTHESIOLOGY

## 2020-02-15 PROCEDURE — 2500000003 HC RX 250 WO HCPCS: Performed by: PODIATRIST

## 2020-02-15 PROCEDURE — 93005 ELECTROCARDIOGRAM TRACING: CPT | Performed by: STUDENT IN AN ORGANIZED HEALTH CARE EDUCATION/TRAINING PROGRAM

## 2020-02-15 PROCEDURE — 84443 ASSAY THYROID STIM HORMONE: CPT

## 2020-02-15 PROCEDURE — 88304 TISSUE EXAM BY PATHOLOGIST: CPT

## 2020-02-15 PROCEDURE — 3600000012 HC SURGERY LEVEL 2 ADDTL 15MIN: Performed by: PODIATRIST

## 2020-02-15 PROCEDURE — 87205 SMEAR GRAM STAIN: CPT

## 2020-02-15 PROCEDURE — 84439 ASSAY OF FREE THYROXINE: CPT

## 2020-02-15 PROCEDURE — 2500000003 HC RX 250 WO HCPCS: Performed by: ANESTHESIOLOGY

## 2020-02-15 PROCEDURE — 6360000002 HC RX W HCPCS: Performed by: STUDENT IN AN ORGANIZED HEALTH CARE EDUCATION/TRAINING PROGRAM

## 2020-02-15 PROCEDURE — 2709999900 HC NON-CHARGEABLE SUPPLY: Performed by: PODIATRIST

## 2020-02-15 PROCEDURE — 3600000002 HC SURGERY LEVEL 2 BASE: Performed by: PODIATRIST

## 2020-02-15 PROCEDURE — 6360000002 HC RX W HCPCS: Performed by: NURSE PRACTITIONER

## 2020-02-15 PROCEDURE — 2580000003 HC RX 258: Performed by: NURSE PRACTITIONER

## 2020-02-15 PROCEDURE — 3700000000 HC ANESTHESIA ATTENDED CARE: Performed by: PODIATRIST

## 2020-02-15 PROCEDURE — 96365 THER/PROPH/DIAG IV INF INIT: CPT

## 2020-02-15 PROCEDURE — 87070 CULTURE OTHR SPECIMN AEROBIC: CPT

## 2020-02-15 PROCEDURE — 36415 COLL VENOUS BLD VENIPUNCTURE: CPT

## 2020-02-15 PROCEDURE — 99222 1ST HOSP IP/OBS MODERATE 55: CPT | Performed by: INTERNAL MEDICINE

## 2020-02-15 PROCEDURE — 87176 TISSUE HOMOGENIZATION CULTR: CPT

## 2020-02-15 PROCEDURE — 3700000001 HC ADD 15 MINUTES (ANESTHESIA): Performed by: PODIATRIST

## 2020-02-15 PROCEDURE — 7100000000 HC PACU RECOVERY - FIRST 15 MIN: Performed by: PODIATRIST

## 2020-02-15 RX ORDER — ONDANSETRON 2 MG/ML
4 INJECTION INTRAMUSCULAR; INTRAVENOUS EVERY 6 HOURS PRN
Status: DISCONTINUED | OUTPATIENT
Start: 2020-02-15 | End: 2020-02-18 | Stop reason: HOSPADM

## 2020-02-15 RX ORDER — PROPOFOL 10 MG/ML
INJECTION, EMULSION INTRAVENOUS PRN
Status: DISCONTINUED | OUTPATIENT
Start: 2020-02-15 | End: 2020-02-15 | Stop reason: SDUPTHER

## 2020-02-15 RX ORDER — POTASSIUM CHLORIDE 7.45 MG/ML
10 INJECTION INTRAVENOUS PRN
Status: DISCONTINUED | OUTPATIENT
Start: 2020-02-15 | End: 2020-02-18 | Stop reason: HOSPADM

## 2020-02-15 RX ORDER — FERROUS SULFATE 325(65) MG
325 TABLET ORAL DAILY
Status: DISCONTINUED | OUTPATIENT
Start: 2020-02-15 | End: 2020-02-15 | Stop reason: SINTOL

## 2020-02-15 RX ORDER — OXYCODONE HYDROCHLORIDE AND ACETAMINOPHEN 5; 325 MG/1; MG/1
1 TABLET ORAL EVERY 4 HOURS PRN
Status: DISCONTINUED | OUTPATIENT
Start: 2020-02-16 | End: 2020-02-18 | Stop reason: HOSPADM

## 2020-02-15 RX ORDER — OXYCODONE HYDROCHLORIDE AND ACETAMINOPHEN 5; 325 MG/1; MG/1
1 TABLET ORAL ONCE
Status: DISCONTINUED | OUTPATIENT
Start: 2020-02-15 | End: 2020-02-18 | Stop reason: HOSPADM

## 2020-02-15 RX ORDER — OXYCODONE HYDROCHLORIDE AND ACETAMINOPHEN 5; 325 MG/1; MG/1
2 TABLET ORAL EVERY 4 HOURS PRN
Status: DISCONTINUED | OUTPATIENT
Start: 2020-02-16 | End: 2020-02-18 | Stop reason: HOSPADM

## 2020-02-15 RX ORDER — PROMETHAZINE HYDROCHLORIDE 25 MG/ML
6.25 INJECTION, SOLUTION INTRAMUSCULAR; INTRAVENOUS
Status: DISCONTINUED | OUTPATIENT
Start: 2020-02-15 | End: 2020-02-15 | Stop reason: HOSPADM

## 2020-02-15 RX ORDER — FENTANYL CITRATE 50 UG/ML
25 INJECTION, SOLUTION INTRAMUSCULAR; INTRAVENOUS EVERY 5 MIN PRN
Status: DISCONTINUED | OUTPATIENT
Start: 2020-02-15 | End: 2020-02-15 | Stop reason: HOSPADM

## 2020-02-15 RX ORDER — SODIUM CHLORIDE 9 MG/ML
INJECTION, SOLUTION INTRAVENOUS CONTINUOUS
Status: DISCONTINUED | OUTPATIENT
Start: 2020-02-15 | End: 2020-02-18 | Stop reason: HOSPADM

## 2020-02-15 RX ORDER — HYDROCODONE BITARTRATE AND ACETAMINOPHEN 5; 325 MG/1; MG/1
1 TABLET ORAL EVERY 6 HOURS PRN
Status: DISCONTINUED | OUTPATIENT
Start: 2020-02-15 | End: 2020-02-15

## 2020-02-15 RX ORDER — ONDANSETRON 2 MG/ML
4 INJECTION INTRAMUSCULAR; INTRAVENOUS EVERY 6 HOURS PRN
Status: DISCONTINUED | OUTPATIENT
Start: 2020-02-15 | End: 2020-02-15 | Stop reason: SDUPTHER

## 2020-02-15 RX ORDER — LABETALOL HYDROCHLORIDE 5 MG/ML
5 INJECTION, SOLUTION INTRAVENOUS EVERY 10 MIN PRN
Status: DISCONTINUED | OUTPATIENT
Start: 2020-02-15 | End: 2020-02-15 | Stop reason: HOSPADM

## 2020-02-15 RX ORDER — ASCORBIC ACID 500 MG
500 TABLET ORAL DAILY
Status: DISCONTINUED | OUTPATIENT
Start: 2020-02-15 | End: 2020-02-18 | Stop reason: HOSPADM

## 2020-02-15 RX ORDER — HYDROCHLOROTHIAZIDE 25 MG/1
25 TABLET ORAL DAILY
Status: DISCONTINUED | OUTPATIENT
Start: 2020-02-15 | End: 2020-02-18 | Stop reason: HOSPADM

## 2020-02-15 RX ORDER — ASPIRIN 81 MG/1
81 TABLET ORAL DAILY
Status: DISCONTINUED | OUTPATIENT
Start: 2020-02-15 | End: 2020-02-18 | Stop reason: HOSPADM

## 2020-02-15 RX ORDER — HYDROMORPHONE HCL 110MG/55ML
0.5 PATIENT CONTROLLED ANALGESIA SYRINGE INTRAVENOUS EVERY 5 MIN PRN
Status: DISCONTINUED | OUTPATIENT
Start: 2020-02-15 | End: 2020-02-15 | Stop reason: HOSPADM

## 2020-02-15 RX ORDER — CEFAZOLIN SODIUM 1 G/3ML
INJECTION, POWDER, FOR SOLUTION INTRAMUSCULAR; INTRAVENOUS PRN
Status: DISCONTINUED | OUTPATIENT
Start: 2020-02-15 | End: 2020-02-15 | Stop reason: SDUPTHER

## 2020-02-15 RX ORDER — NICOTINE 21 MG/24HR
1 PATCH, TRANSDERMAL 24 HOURS TRANSDERMAL DAILY PRN
Status: DISCONTINUED | OUTPATIENT
Start: 2020-02-15 | End: 2020-02-18 | Stop reason: HOSPADM

## 2020-02-15 RX ORDER — MAGNESIUM SULFATE 1 G/100ML
1 INJECTION INTRAVENOUS PRN
Status: DISCONTINUED | OUTPATIENT
Start: 2020-02-15 | End: 2020-02-18 | Stop reason: HOSPADM

## 2020-02-15 RX ORDER — LIDOCAINE HYDROCHLORIDE 20 MG/ML
INJECTION, SOLUTION EPIDURAL; INFILTRATION; INTRACAUDAL; PERINEURAL PRN
Status: DISCONTINUED | OUTPATIENT
Start: 2020-02-15 | End: 2020-02-15 | Stop reason: SDUPTHER

## 2020-02-15 RX ORDER — ACETAMINOPHEN 325 MG/1
650 TABLET ORAL EVERY 4 HOURS PRN
Status: DISCONTINUED | OUTPATIENT
Start: 2020-02-15 | End: 2020-02-18 | Stop reason: HOSPADM

## 2020-02-15 RX ORDER — SODIUM CHLORIDE 9 MG/ML
INJECTION, SOLUTION INTRAVENOUS CONTINUOUS PRN
Status: DISCONTINUED | OUTPATIENT
Start: 2020-02-15 | End: 2020-02-15 | Stop reason: SDUPTHER

## 2020-02-15 RX ORDER — PANTOPRAZOLE SODIUM 40 MG/1
40 TABLET, DELAYED RELEASE ORAL
Status: DISCONTINUED | OUTPATIENT
Start: 2020-02-15 | End: 2020-02-18 | Stop reason: HOSPADM

## 2020-02-15 RX ORDER — CARVEDILOL 6.25 MG/1
6.25 TABLET ORAL 2 TIMES DAILY WITH MEALS
Status: DISCONTINUED | OUTPATIENT
Start: 2020-02-15 | End: 2020-02-18 | Stop reason: HOSPADM

## 2020-02-15 RX ORDER — PROPOFOL 10 MG/ML
INJECTION, EMULSION INTRAVENOUS CONTINUOUS PRN
Status: DISCONTINUED | OUTPATIENT
Start: 2020-02-15 | End: 2020-02-15 | Stop reason: SDUPTHER

## 2020-02-15 RX ORDER — POTASSIUM CHLORIDE 20 MEQ/1
40 TABLET, EXTENDED RELEASE ORAL PRN
Status: DISCONTINUED | OUTPATIENT
Start: 2020-02-15 | End: 2020-02-18 | Stop reason: HOSPADM

## 2020-02-15 RX ORDER — ONDANSETRON 2 MG/ML
4 INJECTION INTRAMUSCULAR; INTRAVENOUS
Status: DISCONTINUED | OUTPATIENT
Start: 2020-02-15 | End: 2020-02-15 | Stop reason: HOSPADM

## 2020-02-15 RX ORDER — EPHEDRINE SULFATE/0.9% NACL/PF 50 MG/5 ML
SYRINGE (ML) INTRAVENOUS PRN
Status: DISCONTINUED | OUTPATIENT
Start: 2020-02-15 | End: 2020-02-15 | Stop reason: SDUPTHER

## 2020-02-15 RX ORDER — OXYCODONE HYDROCHLORIDE AND ACETAMINOPHEN 5; 325 MG/1; MG/1
2 TABLET ORAL PRN
Status: DISCONTINUED | OUTPATIENT
Start: 2020-02-15 | End: 2020-02-15 | Stop reason: HOSPADM

## 2020-02-15 RX ORDER — SODIUM CHLORIDE 0.9 % (FLUSH) 0.9 %
10 SYRINGE (ML) INJECTION EVERY 12 HOURS SCHEDULED
Status: DISCONTINUED | OUTPATIENT
Start: 2020-02-15 | End: 2020-02-18 | Stop reason: HOSPADM

## 2020-02-15 RX ORDER — OXYCODONE HYDROCHLORIDE AND ACETAMINOPHEN 5; 325 MG/1; MG/1
1 TABLET ORAL PRN
Status: DISCONTINUED | OUTPATIENT
Start: 2020-02-15 | End: 2020-02-15 | Stop reason: HOSPADM

## 2020-02-15 RX ORDER — SODIUM CHLORIDE 0.9 % (FLUSH) 0.9 %
10 SYRINGE (ML) INJECTION PRN
Status: DISCONTINUED | OUTPATIENT
Start: 2020-02-15 | End: 2020-02-18 | Stop reason: HOSPADM

## 2020-02-15 RX ORDER — ONDANSETRON 4 MG/1
4 TABLET, ORALLY DISINTEGRATING ORAL EVERY 6 HOURS PRN
Status: DISCONTINUED | OUTPATIENT
Start: 2020-02-15 | End: 2020-02-18 | Stop reason: HOSPADM

## 2020-02-15 RX ORDER — HYDRALAZINE HYDROCHLORIDE 20 MG/ML
5 INJECTION INTRAMUSCULAR; INTRAVENOUS EVERY 10 MIN PRN
Status: DISCONTINUED | OUTPATIENT
Start: 2020-02-15 | End: 2020-02-15 | Stop reason: HOSPADM

## 2020-02-15 RX ORDER — TAMSULOSIN HYDROCHLORIDE 0.4 MG/1
0.4 CAPSULE ORAL DAILY
Status: DISCONTINUED | OUTPATIENT
Start: 2020-02-15 | End: 2020-02-18 | Stop reason: HOSPADM

## 2020-02-15 RX ADMIN — PROPOFOL 50 MG: 10 INJECTION, EMULSION INTRAVENOUS at 14:06

## 2020-02-15 RX ADMIN — Medication 10 ML: at 02:45

## 2020-02-15 RX ADMIN — PIPERACILLIN SODIUM AND TAZOBACTAM SODIUM 4.5 G: 4; .5 INJECTION, POWDER, LYOPHILIZED, FOR SOLUTION INTRAVENOUS at 00:02

## 2020-02-15 RX ADMIN — PROPOFOL 75 MCG/KG/MIN: 10 INJECTION, EMULSION INTRAVENOUS at 14:06

## 2020-02-15 RX ADMIN — OXYCODONE AND ACETAMINOPHEN 2 TABLET: 5; 325 TABLET ORAL at 23:11

## 2020-02-15 RX ADMIN — FENTANYL CITRATE 25 MCG: 50 INJECTION, SOLUTION INTRAMUSCULAR; INTRAVENOUS at 15:24

## 2020-02-15 RX ADMIN — PIPERACILLIN AND TAZOBACTAM 3.38 G: 3; .375 INJECTION, POWDER, LYOPHILIZED, FOR SOLUTION INTRAVENOUS at 16:25

## 2020-02-15 RX ADMIN — FENTANYL CITRATE 25 MCG: 50 INJECTION, SOLUTION INTRAMUSCULAR; INTRAVENOUS at 15:29

## 2020-02-15 RX ADMIN — SODIUM CHLORIDE: 9 INJECTION, SOLUTION INTRAVENOUS at 21:55

## 2020-02-15 RX ADMIN — SODIUM CHLORIDE: 9 INJECTION, SOLUTION INTRAVENOUS at 14:01

## 2020-02-15 RX ADMIN — CEFAZOLIN 2000 MG: 1 INJECTION, POWDER, FOR SOLUTION INTRAMUSCULAR; INTRAVENOUS at 14:10

## 2020-02-15 RX ADMIN — SODIUM CHLORIDE: 9 INJECTION, SOLUTION INTRAVENOUS at 02:45

## 2020-02-15 RX ADMIN — PROPOFOL 40 MG: 10 INJECTION, EMULSION INTRAVENOUS at 14:08

## 2020-02-15 RX ADMIN — PIPERACILLIN AND TAZOBACTAM 3.38 G: 3; .375 INJECTION, POWDER, LYOPHILIZED, FOR SOLUTION INTRAVENOUS at 05:53

## 2020-02-15 RX ADMIN — LIDOCAINE HYDROCHLORIDE 80 MG: 20 INJECTION, SOLUTION EPIDURAL; INFILTRATION; INTRACAUDAL; PERINEURAL at 14:06

## 2020-02-15 RX ADMIN — HYDROCODONE BITARTRATE AND ACETAMINOPHEN 1 TABLET: 5; 325 TABLET ORAL at 19:07

## 2020-02-15 RX ADMIN — Medication 10 MG: at 14:24

## 2020-02-15 RX ADMIN — POTASSIUM CHLORIDE 40 MEQ: 20 TABLET, EXTENDED RELEASE ORAL at 03:29

## 2020-02-15 RX ADMIN — DOXYCYCLINE 100 MG: 100 CAPSULE ORAL at 20:37

## 2020-02-15 ASSESSMENT — PULMONARY FUNCTION TESTS
PIF_VALUE: 1
PIF_VALUE: 0
PIF_VALUE: 1
PIF_VALUE: 0
PIF_VALUE: 1
PIF_VALUE: 0
PIF_VALUE: 1

## 2020-02-15 ASSESSMENT — ENCOUNTER SYMPTOMS
RHINORRHEA: 0
WHEEZING: 0
CONSTIPATION: 0
NAUSEA: 0
DIARRHEA: 0
ABDOMINAL PAIN: 0
PHOTOPHOBIA: 0
COUGH: 0
VOMITING: 0
ABDOMINAL DISTENTION: 0
COLOR CHANGE: 1
SORE THROAT: 1
COLOR CHANGE: 0
SORE THROAT: 0
APNEA: 0
SHORTNESS OF BREATH: 0
BLOOD IN STOOL: 0
SINUS PRESSURE: 0

## 2020-02-15 ASSESSMENT — PAIN SCALES - GENERAL
PAINLEVEL_OUTOF10: 4
PAINLEVEL_OUTOF10: 3
PAINLEVEL_OUTOF10: 4
PAINLEVEL_OUTOF10: 0
PAINLEVEL_OUTOF10: 7
PAINLEVEL_OUTOF10: 2
PAINLEVEL_OUTOF10: 4
PAINLEVEL_OUTOF10: 3
PAINLEVEL_OUTOF10: 7
PAINLEVEL_OUTOF10: 3
PAINLEVEL_OUTOF10: 7

## 2020-02-15 ASSESSMENT — PAIN DESCRIPTION - ORIENTATION: ORIENTATION: RIGHT

## 2020-02-15 ASSESSMENT — PAIN DESCRIPTION - DESCRIPTORS: DESCRIPTORS: THROBBING

## 2020-02-15 ASSESSMENT — PAIN DESCRIPTION - LOCATION: LOCATION: FOOT
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
## 2020-02-15 NOTE — CONSULTS
9/7/2018); and pr office/outpt visit,procedure only (Right, 11/9/2018). Medications  Prior to Admission medications    Medication Sig Start Date End Date Taking? Authorizing Provider   omeprazole 20 MG EC tablet Take 20 mg by mouth daily 9/9/19  Yes Historical Provider, MD   levothyroxine (SYNTHROID) 175 MCG tablet Take 175 mcg by mouth Daily   Yes Historical Provider, MD   carvedilol (COREG) 6.25 MG tablet Take 6.25 mg by mouth 2 times daily (with meals)   Yes Historical Provider, MD   ferrous sulfate 325 (65 Fe) MG tablet Take 325 mg by mouth daily 1/6/20  Yes Historical Provider, MD   vitamin D-3 (CHOLECALCIFEROL) 125 MCG (5000 UT) TABS TAKE ONE CAPSULE BY MOUTH EVERY DAY 6/27/19  Yes Historical Provider, MD   vitamin C (ASCORBIC ACID) 500 MG tablet Take 500 mg by mouth daily   Yes Historical Provider, MD   aspirin 81 MG tablet Take 81 mg by mouth daily   Yes Historical Provider, MD   hydrochlorothiazide (HYDRODIURIL) 25 MG tablet Take 25 mg by mouth daily  3/13/16  Yes Historical Provider, MD   lisinopril (PRINIVIL;ZESTRIL) 5 MG tablet Take 5 mg by mouth daily   Yes Historical Provider, MD   tamsulosin (FLOMAX) 0.4 MG capsule Take 0.4 mg by mouth daily   Yes Historical Provider, MD    Scheduled Meds:   piperacillin-tazobactam  3.375 g Intravenous Q8H    piperacillin-tazobactam  4.5 g Intravenous Once    doxycycline hyclate  100 mg Oral 2 times per day     Continuous Infusions:  PRN Meds:. Allergies  has No Known Allergies. Family History  family history includes Arthritis in his mother; Diabetes in his brother; Heart Disease in his sister; High Blood Pressure in his mother; Stomach Cancer in his sister. Social History   reports that he quit smoking about 29 years ago. His smoking use included cigarettes. He started smoking about 55 years ago. He has a 10.20 pack-year smoking history. He has never used smokeless tobacco.   reports current alcohol use. reports previous drug use.  Drug: appear to sinus track, or undermine. No fluctuance, crepitus, or induration. Interdigital maceration absent. Clinical Images:          Imaging:   XR FOOT RIGHT (MIN 3 VIEWS)   Final Result   1. Radiographic findings most consistent with 4th digit acute osteomyelitis   with severe bone loss and surrounding cellulitis. Recommend clinical   correlation. Note: Radiographic evaluation is less sensitive in detection   and description of osteomyelitis comparison with MR modality. 2. 1st metatarsophalangeal joint arthrodesis without evidence of hardware   complication. 3. 3rd digit proximal interphalangeal moderate degenerative joint disease. Cultures: Not obtained     Assessment     Shannon Rashid Sr. is a 71 y.o. male with   1. Osteomyelitis proximal phalanx of 4th digit, right foot   2. Wound right foot  3. Cellulitis, right foot     Active Problems:    * No active hospital problems. *  Resolved Problems:    * No resolved hospital problems. *        Plan     · Patient examined and evaluated at bedside   · Treatment options discussed in detail with the patient  · Medical management per IM  · Recommend ID consult for antibiotic selection - 1 kidney sees nephro for this  · Antibiotics: Doxy/Zosyn until ID recs  · Dressing applied to Right foot: DSD ACE  · WBAT to Right lower extremity  · NPO after midnight. Continue anticoagulation. · Tentative OR for 4th digit amputation on 2/15 to follow noon surgical case. Message left to care coordination hub. Will confirm in AM. Discussed with patient.    · Discussed with BETTINA ChavezM   Podiatric Medicine & Surgery   2/14/2020 at 11:40 PM
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
## 2020-02-15 NOTE — PROGRESS NOTES
Progress Note  Podiatric Medicine and Surgery     Subjective     CC: Right foot wound     Patient seen and examined at bedside. No acute events overnight. Afebrile, vital signs stable   NPO anticipating surgery this afternoon. HPI :  Kumar Whalen is a non-diabetic 71 y.o. male seen at bedside for evaluation of right foot wound. Dr. Keara Christine recommended that the patient present to the ER today for evaluation when seen in his clinic. Patient presents with his wife who assists in providing some of the history. The patient states the wound started about a week ago after he dropped a table on his foot. He continues to have increased swelling and right foot pain. Upon evaluation and xrays in Dr. Miesha Javier office patient was noted to have osteomyelitis of the 4th digit right foot. Patient only reports pain. He is non diabetic. He has a history of previous R 1st MPJ fusion performed approx 1 year ago. Denies other pedal complaints. PCP is Kenia Lyn MD    ROS: Denies N/V/F/C/SOB/CP. Otherwise negative except at stated in the HPI.      Medications:  Scheduled Meds:   aspirin  81 mg Oral Daily    carvedilol  6.25 mg Oral BID WC    hydrochlorothiazide  25 mg Oral Daily    levothyroxine  175 mcg Oral Daily    pantoprazole  40 mg Oral QAM AC    tamsulosin  0.4 mg Oral Daily    vitamin C  500 mg Oral Daily    vitamin D  5,000 Units Oral Daily    sodium chloride flush  10 mL Intravenous 2 times per day    enoxaparin  40 mg Subcutaneous Daily    piperacillin-tazobactam  3.375 g Intravenous Q8H    doxycycline monohydrate  100 mg Oral 2 times per day       Continuous Infusions:   sodium chloride 75 mL/hr at 02/15/20 0245       PRN Meds:sodium chloride flush, potassium chloride **OR** potassium alternative oral replacement **OR** potassium chloride, magnesium sulfate, magnesium hydroxide, nicotine, acetaminophen, ondansetron **OR** ondansetron    Objective     Vitals:  Patient Vitals for the past 8 hrs: BP Temp Temp src Pulse Resp SpO2   02/15/20 1101 139/75 97.9 °F (36.6 °C) Axillary (!) 49 15 100 %   02/15/20 0735 109/74 98.1 °F (36.7 °C) Oral 50 16 97 %     Average, Min, and Max for last 24 hours Vitals:  TEMPERATURE:  Temp  Av.2 °F (36.8 °C)  Min: 97.9 °F (36.6 °C)  Max: 99 °F (37.2 °C)    RESPIRATIONS RANGE: Resp  Av.7  Min: 15  Max: 22    PULSE RANGE: Pulse  Av.2  Min: 49  Max: 76    BLOOD PRESSURE RANGE:  Systolic (78YAO), VNX:746 , Min:109 , QZM:833   ; Diastolic (91GZT), RJM:97, Min:74, Max:85      PULSE OXIMETRY RANGE: SpO2  Av.6 %  Min: 96 %  Max: 100 %    I/O last 3 completed shifts: In: 380.4 [P.O.:200; I.V.:180.4]  Out: 600 [Urine:600]    CBC:  Recent Labs     20   WBC 7.9   HGB 10.1*   HCT 32.0*      CRP 32.8*        BMP:  Recent Labs     20  2238      K 3.5*   CL 99   CO2 24   BUN 22   CREATININE 1.77*   GLUCOSE 111*   CALCIUM 8.6        Coags:  No results for input(s): APTT, PROT, INR in the last 72 hours. Lab Results   Component Value Date    SEDRATE 80 (H) 2020     Recent Labs     20  2238   CRP 32.8*       Lower Extremity Physical Exam: Exam from previous 2020 - dressing intact  Vascular: DP and PT pulses are palpable Left. DP pulse palpable right non palpable let. CFT <4 seconds to all digits. Hair growth is absent to the level of the digits. 1+ pitting edema right foot. No edema left foot.      Neuro: Saph/sural/SP/DP/plantar sensation intact to light touch.     Musculoskeletal: Muscle strength is 5/5 to all lower extremity muscle groups. Gross deformity is present with contracted digits 1-5 bilat.     Dermatologic: Full thickness ulcer #1 located dorsal base of 4th digit and measures approximately 2.0cm x 2.0cm x 0.2cm. Base is fibrogranular. Periwound skin is macerated. Serous drainage noted with mild2 associated mal odor. Erythema present to periwound with associated increase in warmth.  Unable to assess probe to bone

## 2020-02-15 NOTE — PLAN OF CARE
Problem: Pain:  Goal: Pain level will decrease  Description  Pain level will decrease  Outcome: Ongoing  Note:   Pt denies severe pain     Problem: Falls - Risk of:  Goal: Will remain free from falls  Description  Will remain free from falls  Outcome: Ongoing  Note:   Bed alarm in use, call bell in reach, encouraged to use prn     Problem: Skin Integrity:  Goal: Will show no infection signs and symptoms  Description  Will show no infection signs and symptoms  Outcome: Ongoing  Note:   Dressing per podiatry -- dry & intact

## 2020-02-15 NOTE — ED PROVIDER NOTES
eMERGENCY dEPARTMENT eNCOUnter   Attending Attestation     Pt Name: Cass Zapata MRN: [id number]  Birthdate 1950  Date of evaluation: 2/15/20   Robin Leyva is a 71 y.o. male with CC: Toe Pain        EKG: All EKG's are interpreted by the Emergency Department Physician who either signs or Co-signs this chart in the absence of a cardiologist.      RADIOLOGY:All plain film, CT, MRI, and formal ultrasound images (except ED bedside ultrasound) are read by the radiologist, see reports below, unless otherwise noted in MDM or here. XR FOOT RIGHT (MIN 3 VIEWS)   Final Result   1. Radiographic findings most consistent with 4th digit acute osteomyelitis   with severe bone loss and surrounding cellulitis. Recommend clinical   correlation. Note: Radiographic evaluation is less sensitive in detection   and description of osteomyelitis comparison with MR modality. 2. 1st metatarsophalangeal joint arthrodesis without evidence of hardware   complication. 3. 3rd digit proximal interphalangeal moderate degenerative joint disease. LABS: All lab results were reviewed by myself, and all abnormals are listed below.   Labs Reviewed   CBC WITH AUTO DIFFERENTIAL - Abnormal; Notable for the following components:       Result Value    RBC 3.72 (*)     Hemoglobin 10.1 (*)     Hematocrit 32.0 (*)     Seg Neutrophils 82 (*)     Lymphocytes 8 (*)     Absolute Lymph # 0.63 (*)     All other components within normal limits   BASIC METABOLIC PANEL - Abnormal; Notable for the following components:    Glucose 111 (*)     CREATININE 1.77 (*)     Potassium 3.5 (*)     GFR Non- 38 (*)     GFR  46 (*)     All other components within normal limits   SEDIMENTATION RATE - Abnormal; Notable for the following components:    Sed Rate 80 (*)     All other components within normal limits   C-REACTIVE PROTEIN           I personally evaluated and examined the patient in conjunction with the APC and

## 2020-02-15 NOTE — H&P
described in HPI. The patient is quite somnolent  Review of Systems   Constitutional: Positive for activity change (decreased). Negative for appetite change, chills, diaphoresis, fatigue and fever. HENT: Negative for congestion and nosebleeds. Eyes: Negative for photophobia and visual disturbance. Respiratory: Negative for cough, shortness of breath and wheezing. Cardiovascular: Negative for chest pain and palpitations. Lying flat    Gastrointestinal: Negative for abdominal distention, blood in stool, nausea and vomiting. Genitourinary: Negative for flank pain and hematuria. Musculoskeletal: Positive for arthralgias, gait problem and myalgias. Foot pain    Skin: Negative for rash. Wound: toe/foot. Neurological: Negative for dizziness and light-headedness. Physical Exam:   /75   Pulse (!) 49   Temp 97.9 °F (36.6 °C) (Axillary)   Resp 15   Ht 5' 8\" (1.727 m)   Wt 175 lb 14.4 oz (79.8 kg)   SpO2 100%   BMI 26.75 kg/m²   Temp (24hrs), Av.2 °F (36.8 °C), Min:97.9 °F (36.6 °C), Max:99 °F (37.2 °C)    No results for input(s): POCGLU in the last 72 hours. Intake/Output Summary (Last 24 hours) at 2/15/2020 1340  Last data filed at 2/15/2020 1024  Gross per 24 hour   Intake 380.4 ml   Output 900 ml   Net -519.6 ml       Physical Exam  Vitals signs reviewed. Constitutional:       General: He is not in acute distress. Appearance: He is not diaphoretic. HENT:      Head: Normocephalic. Nose: Nose normal.   Eyes:      General: No scleral icterus. Conjunctiva/sclera: Conjunctivae normal.   Neck:      Musculoskeletal: Neck supple. Trachea: No tracheal deviation. Cardiovascular:      Rate and Rhythm: Normal rate and regular rhythm. Comments: Has been bradycardic at times  Pulmonary:      Effort: Pulmonary effort is normal. No respiratory distress. Breath sounds: Normal breath sounds. No wheezing or rales.    Chest:      Chest wall: No 10:38 PM   Result Value Ref Range    Sed Rate 80 (H) 0 - 15 mm   C-Reactive Protein    Collection Time: 02/14/20 10:38 PM   Result Value Ref Range    CRP 32.8 (H) 0.0 - 5.0 mg/L       Imaging/Diagnostics:    Xr Foot Right (min 3 Views)    Result Date: 2/14/2020  1. Radiographic findings most consistent with 4th digit acute osteomyelitis with severe bone loss and surrounding cellulitis. Recommend clinical correlation. Note: Radiographic evaluation is less sensitive in detection and description of osteomyelitis comparison with MR modality. 2. 1st metatarsophalangeal joint arthrodesis without evidence of hardware complication. 3. 3rd digit proximal interphalangeal moderate degenerative joint disease. Assessment :      Primary Problem  Osteomyelitis [facility])    Active Hospital Problems    Diagnosis Date Noted    [facility] [M86.9] 02/15/2020    Bradycardia [R00.1] 02/15/2020    CKD (chronic kidney disease), stage III ([facility] [N18.3] 02/15/2020    Anemia, normocytic normochromic [D64.9] 02/15/2020    Hypertension [I10]     Hypothyroidism [E03.9]     Hepatitis C antibody positive in blood [R76.8] 01/01/2015       Plan:     Admit  Antibiotics per C&S Results   Podiatry evaluation:  On call OR  Blood Pressure - Monitor and control  Thyroid replacement, check TSH   DVT prophylaxis  Hold beta-blockers as needed for symptomatic bradycardia  Anemia w/u on outpatient basis is suggested    PT/OT  Social Service consult for discharge planning     Consultations:   IP CONSULT TO INTERNAL MEDICINE  IP CONSULT TO PODIATRY  IP CONSULT TO PODIATRY     Patient is admitted as inpatient status because of co-morbidities listed above, severity of signs and symptoms as outlined, requirement for current medical therapies and most importantly because of direct risk to patient if care not provided in a hospital setting.     Barbara Tillman DO  2/15/2020  1:40 PM    Copy sent to Dr. Jasmina Barnhart MD

## 2020-02-15 NOTE — ED NOTES
Pt walks back to room for complaint of infected fourth right toe. Pt states \"I was sent by my podiatrist so that I can have surgery tomorrow. The doctor says if we don't cut off my toe immediately he may have to amputate the leg and I don't want that. \"  Pt states he saw his doctor today and that he had an x-ray and some lab work drawn. Pt states that the area is painful and his foot is swollen. Pt denies any fever, chills or sweats at home. Pt asking if they will give him something for sleep upstairs. Writer states \"I don't know what they may give you as we don't give sleep meds down here\". Pt is a & o x 4 and is able to ambulate with no noticeable limp. Pt's HR is strong, RR are even and unlabored. Pt's skin is warm, dry and otherwise appropriate for ethnicity. Upon removal of patient's right sock and bandage an ulceration to the skin just before the fourth toe is noted. Serosanguinous drainage is noted coming from the site. Pt's fourth right toe is swollen as is his right foot and ankle.        Fe Weber RN  02/14/20 1785

## 2020-02-16 PROBLEM — S98.131A AMPUTATION OF TOE OF RIGHT FOOT (HCC): Status: ACTIVE | Noted: 2020-02-16

## 2020-02-16 LAB
ANION GAP SERPL CALCULATED.3IONS-SCNC: 7 MMOL/L (ref 9–17)
BUN BLDV-MCNC: 17 MG/DL (ref 8–23)
BUN/CREAT BLD: 12 (ref 9–20)
CALCIUM SERPL-MCNC: 8.2 MG/DL (ref 8.6–10.4)
CHLORIDE BLD-SCNC: 107 MMOL/L (ref 98–107)
CO2: 27 MMOL/L (ref 20–31)
CREAT SERPL-MCNC: 1.39 MG/DL (ref 0.7–1.2)
GFR AFRICAN AMERICAN: >60 ML/MIN
GFR NON-AFRICAN AMERICAN: 51 ML/MIN
GFR SERPL CREATININE-BSD FRML MDRD: ABNORMAL ML/MIN/{1.73_M2}
GFR SERPL CREATININE-BSD FRML MDRD: ABNORMAL ML/MIN/{1.73_M2}
GLUCOSE BLD-MCNC: 120 MG/DL (ref 70–99)
HCT VFR BLD CALC: 34.3 % (ref 40.7–50.3)
HEMOGLOBIN: 10.6 G/DL (ref 13–17)
INR BLD: 1
MCH RBC QN AUTO: 27.2 PG (ref 25.2–33.5)
MCHC RBC AUTO-ENTMCNC: 30.9 G/DL (ref 28.4–34.8)
MCV RBC AUTO: 87.9 FL (ref 82.6–102.9)
NRBC AUTOMATED: 0 PER 100 WBC
PDW BLD-RTO: 13.2 % (ref 11.8–14.4)
PLATELET # BLD: 313 K/UL (ref 138–453)
PMV BLD AUTO: 10.3 FL (ref 8.1–13.5)
POTASSIUM SERPL-SCNC: 4.2 MMOL/L (ref 3.7–5.3)
PROTHROMBIN TIME: 10.6 SEC (ref 9.7–11.6)
RBC # BLD: 3.9 M/UL (ref 4.21–5.77)
SODIUM BLD-SCNC: 141 MMOL/L (ref 135–144)
WBC # BLD: 5.7 K/UL (ref 3.5–11.3)

## 2020-02-16 PROCEDURE — 97110 THERAPEUTIC EXERCISES: CPT

## 2020-02-16 PROCEDURE — 6370000000 HC RX 637 (ALT 250 FOR IP): Performed by: NURSE PRACTITIONER

## 2020-02-16 PROCEDURE — 85027 COMPLETE CBC AUTOMATED: CPT

## 2020-02-16 PROCEDURE — 1200000000 HC SEMI PRIVATE

## 2020-02-16 PROCEDURE — 85610 PROTHROMBIN TIME: CPT

## 2020-02-16 PROCEDURE — 2580000003 HC RX 258: Performed by: STUDENT IN AN ORGANIZED HEALTH CARE EDUCATION/TRAINING PROGRAM

## 2020-02-16 PROCEDURE — 99232 SBSQ HOSP IP/OBS MODERATE 35: CPT | Performed by: INTERNAL MEDICINE

## 2020-02-16 PROCEDURE — 97162 PT EVAL MOD COMPLEX 30 MIN: CPT

## 2020-02-16 PROCEDURE — 6360000002 HC RX W HCPCS: Performed by: STUDENT IN AN ORGANIZED HEALTH CARE EDUCATION/TRAINING PROGRAM

## 2020-02-16 PROCEDURE — 99222 1ST HOSP IP/OBS MODERATE 55: CPT | Performed by: INTERNAL MEDICINE

## 2020-02-16 PROCEDURE — 36415 COLL VENOUS BLD VENIPUNCTURE: CPT

## 2020-02-16 PROCEDURE — 2580000003 HC RX 258: Performed by: INTERNAL MEDICINE

## 2020-02-16 PROCEDURE — 97116 GAIT TRAINING THERAPY: CPT

## 2020-02-16 PROCEDURE — 80048 BASIC METABOLIC PNL TOTAL CA: CPT

## 2020-02-16 PROCEDURE — 6370000000 HC RX 637 (ALT 250 FOR IP): Performed by: STUDENT IN AN ORGANIZED HEALTH CARE EDUCATION/TRAINING PROGRAM

## 2020-02-16 PROCEDURE — 6360000002 HC RX W HCPCS: Performed by: INTERNAL MEDICINE

## 2020-02-16 RX ADMIN — OXYCODONE AND ACETAMINOPHEN 2 TABLET: 5; 325 TABLET ORAL at 18:26

## 2020-02-16 RX ADMIN — LEVOTHYROXINE SODIUM 175 MCG: 0.03 TABLET ORAL at 06:01

## 2020-02-16 RX ADMIN — HYDROCHLOROTHIAZIDE 25 MG: 25 TABLET ORAL at 08:25

## 2020-02-16 RX ADMIN — CARVEDILOL 6.25 MG: 6.25 TABLET, FILM COATED ORAL at 08:38

## 2020-02-16 RX ADMIN — CARVEDILOL 6.25 MG: 6.25 TABLET, FILM COATED ORAL at 17:26

## 2020-02-16 RX ADMIN — CEFTAROLINE FOSAMIL 400 MG: 600 POWDER, FOR SOLUTION INTRAVENOUS at 12:44

## 2020-02-16 RX ADMIN — OXYCODONE AND ACETAMINOPHEN 2 TABLET: 5; 325 TABLET ORAL at 23:09

## 2020-02-16 RX ADMIN — DOXYCYCLINE 100 MG: 100 CAPSULE ORAL at 08:24

## 2020-02-16 RX ADMIN — OXYCODONE HYDROCHLORIDE AND ACETAMINOPHEN 500 MG: 500 TABLET ORAL at 08:25

## 2020-02-16 RX ADMIN — CHOLECALCIFEROL CAP 125 MCG (5000 UNIT) 5000 UNITS: 125 CAP at 08:24

## 2020-02-16 RX ADMIN — OXYCODONE AND ACETAMINOPHEN 2 TABLET: 5; 325 TABLET ORAL at 06:01

## 2020-02-16 RX ADMIN — PIPERACILLIN AND TAZOBACTAM 3.38 G: 3; .375 INJECTION, POWDER, LYOPHILIZED, FOR SOLUTION INTRAVENOUS at 00:13

## 2020-02-16 RX ADMIN — PANTOPRAZOLE SODIUM 40 MG: 40 TABLET, DELAYED RELEASE ORAL at 06:01

## 2020-02-16 RX ADMIN — TAMSULOSIN HYDROCHLORIDE 0.4 MG: 0.4 CAPSULE ORAL at 08:24

## 2020-02-16 RX ADMIN — SODIUM CHLORIDE: 9 INJECTION, SOLUTION INTRAVENOUS at 05:17

## 2020-02-16 RX ADMIN — ASPIRIN 81 MG: 81 TABLET, COATED ORAL at 08:24

## 2020-02-16 RX ADMIN — SODIUM CHLORIDE: 9 INJECTION, SOLUTION INTRAVENOUS at 12:41

## 2020-02-16 RX ADMIN — PIPERACILLIN AND TAZOBACTAM 3.38 G: 3; .375 INJECTION, POWDER, LYOPHILIZED, FOR SOLUTION INTRAVENOUS at 08:27

## 2020-02-16 RX ADMIN — ENOXAPARIN SODIUM 40 MG: 40 INJECTION SUBCUTANEOUS at 08:24

## 2020-02-16 RX ADMIN — SODIUM CHLORIDE: 9 INJECTION, SOLUTION INTRAVENOUS at 23:03

## 2020-02-16 ASSESSMENT — ENCOUNTER SYMPTOMS
ABDOMINAL PAIN: 0
VOMITING: 0
COUGH: 0
NAUSEA: 0
SHORTNESS OF BREATH: 0

## 2020-02-16 ASSESSMENT — PAIN DESCRIPTION - LOCATION: LOCATION: FOOT

## 2020-02-16 ASSESSMENT — PAIN DESCRIPTION - PAIN TYPE: TYPE: SURGICAL PAIN

## 2020-02-16 ASSESSMENT — PAIN SCALES - GENERAL
PAINLEVEL_OUTOF10: 4
PAINLEVEL_OUTOF10: 7

## 2020-02-16 ASSESSMENT — PAIN DESCRIPTION - FREQUENCY: FREQUENCY: INTERMITTENT

## 2020-02-16 ASSESSMENT — PAIN DESCRIPTION - DESCRIPTORS: DESCRIPTORS: ACHING

## 2020-02-16 NOTE — PLAN OF CARE
Problem: Pain:  Goal: Pain level will decrease  Description  Pain level will decrease  2/16/2020 0821 by Curry Nissen, RN  Outcome: Ongoing  2/16/2020 0347 by Sindhu Clayton RN  Outcome: Ongoing  Note:   Pt indicates adequate pain control from prn pain meds given  Goal: Control of acute pain  Description  Control of acute pain  Outcome: Ongoing  Goal: Control of chronic pain  Description  Control of chronic pain  Outcome: Ongoing     Problem: Falls - Risk of:  Goal: Will remain free from falls  Description  Will remain free from falls  2/16/2020 0821 by Curry Nissen, RN  Outcome: Ongoing  2/16/2020 0347 by Sindhu Clayton RN  Outcome: Ongoing  Note:   Bed alarm on, call bell in reach, encouraged to use prn -- up with sba/boot, heel wt bearing only  Goal: Absence of physical injury  Description  Absence of physical injury  Outcome: Ongoing     Problem: Skin Integrity:  Goal: Will show no infection signs and symptoms  Description  Will show no infection signs and symptoms  2/16/2020 0821 by Curry Nissen, RN  Outcome: Ongoing  2/16/2020 0347 by Sindhu Clayton RN  Outcome: Ongoing  Note:   Dressing to rt foot remains d&i  Goal: Absence of new skin breakdown  Description  Absence of new skin breakdown  Outcome: Ongoing

## 2020-02-16 NOTE — PROGRESS NOTES
guard assistance  Comment: VC for safe hand placement with RW; Difficult for pt. to maintain heel touch WB only Rt. foot in surgical shoe due to shoe very rigid/bulky. VC for how to use RW to take wt. off of Rt. foot  Ambulation  Ambulation?: Yes  Ambulation 1  Surface: level tile  Device: Rolling Walker  Assistance: Contact guard assistance  Quality of Gait: Difficult for pt. to maintain heel touch WB only Rt. foot in surgical shoe due to shoe very rigid/bulky. Recommended Darco Wedge Off-loading shoe to RN- plans to ask podiatry . Distance: 30ft. Comments: Did not want to increase amb. distance being unsure if pt. WB in forefoot. Very difficult for pt. to lift forefoot and heel touch in current surgical shoe. Balance  Posture: Good  Sitting - Static: Good  Sitting - Dynamic: Good  Standing - Static: Good;-  Exercises  Comments: Encouraged pt to be performing AROM x 4 extremities while in bed. Pt. with good understanding. Plan   Plan  Times per week: 1-2x/day; 5-6days/wk  Current Treatment Recommendations: Strengthening, ROM, Balance Training, Functional Mobility Training, Transfer Training, Gait Training, Endurance Training, Stair training, Patient/Caregiver Education & Training, Safety Education & Training, Home Exercise Program  Safety Devices  Type of devices: All fall risk precautions in place, Gait belt, Call light within reach, Patient at risk for falls, Left in bed, Nurse notified, Bed alarm in place         Goals  Short term goals  Time Frame for Short term goals: 12 visits:  Short term goal 1: Pt. to be indep with bed mob. Short term goal 2: Pt. to be SBA for sit to stand transfers with RW, heel touch only in Rt. foot  Short term goal 3: Pt. to amb. 50ft. with RW, with wt. through heel only Rt foot/ able to maintain heel touch % of time. Short term goal 4: Follow up with RN/social work re. recommendation of Darco Wedge Off-loading shoe for Rt. foot  Short term goal 5: If pt.

## 2020-02-16 NOTE — CONSULTS
[facility]  Initial Consult Note  Date: 2/16/2020    Hospital day :1     Impression:   1. Right foot fourth toe osteomyelitis related to a dorsal foot ulceration status post fourth toe amputation  2. Acute kidney injury-improving  3. Solitary kidney    Recommendations   · The surgical culture with gram-positive organisms  · I will stop the Zosyn and start him on ceftaroline for now given the acute kidney injury and solitary kidney I will avoid nephrotoxic agents  · The surgical site looks good status post toe amputation  · The patient will likely need 2 weeks of antimicrobial therapy but I suspect given that the toe has been amputated he will not need a prolonged course of antibiotics    Chief complaint/reason for consultation:   Osteomyelitis of the proximal phalanx of the fourth digit of the right foot    History of Present Illness:   Megan Miller Sr. is a 71y.o.-year-old male who was initially admitted on 2/14/2020. Ginna Bull has a history of a right first metatarsophalangeal arthrodesis due to a hallux valgus deformity the patient did subsequently need the right first metatarsophalangeal joint arthrodesis revised due to hardware failure in December 2018. The patient is otherwise done well and reports that he had dropped a table on the top of his right dorsal foot and subsequently developed a blister/wound at the base of the fourth digit and he subsequently started to get swelling in the right foot fourth toe as well as pain which prompted him to come into the emergency room. X-ray imaging showed changes consistent with osteomyelitis    I have personally reviewed the past medical history, past surgical history, medications, social history, and family history, and I have updated the database accordingly.   Past Medical History:     Past Medical History:   Diagnosis Date    Arthritis     Epilepsy ([facility]     pt states he out grown it- last seizure 25 years ago    Family history of diabetes

## 2020-02-16 NOTE — PROGRESS NOTES
Progress Note  Podiatric Medicine and Surgery     Subjective     CC: Right foot wound     Patient seen and examined at bedside. POD1 s/p right 4th digit amputation. No acute events overnight. Afebrile, vital signs stable   Denies pain today. HPI :  Ricarda Gomez is a non-diabetic 71 y.o. male seen at bedside for evaluation of right foot wound. Dr. Hussain Gerber recommended that the patient present to the ER today for evaluation when seen in his clinic. Patient presents with his wife who assists in providing some of the history. The patient states the wound started about a week ago after he dropped a table on his foot. He continues to have increased swelling and right foot pain. Upon evaluation and xrays in Dr. Akosua Meadows office patient was noted to have osteomyelitis of the 4th digit right foot. Patient only reports pain. He is non diabetic. He has a history of previous R 1st MPJ fusion performed approx 1 year ago. Denies other pedal complaints. PCP is Josué Staton MD    ROS: Denies N/V/F/C/SOB/CP. Otherwise negative except at stated in the HPI.      Medications:  Scheduled Meds:   aspirin  81 mg Oral Daily    carvedilol  6.25 mg Oral BID WC    hydrochlorothiazide  25 mg Oral Daily    levothyroxine  175 mcg Oral Daily    pantoprazole  40 mg Oral QAM AC    tamsulosin  0.4 mg Oral Daily    vitamin C  500 mg Oral Daily    vitamin D  5,000 Units Oral Daily    sodium chloride flush  10 mL Intravenous 2 times per day    enoxaparin  40 mg Subcutaneous Daily    oxyCODONE-acetaminophen  1 tablet Oral Once    piperacillin-tazobactam  3.375 g Intravenous Q8H    doxycycline monohydrate  100 mg Oral 2 times per day       Continuous Infusions:   sodium chloride 75 mL/hr at 02/16/20 0517       PRN Meds:sodium chloride flush, potassium chloride **OR** potassium alternative oral replacement **OR** potassium chloride, magnesium sulfate, magnesium hydroxide, nicotine, acetaminophen, ondansetron **OR** ondansetron, oxyCODONE-acetaminophen **OR** oxyCODONE-acetaminophen    Objective     Vitals:  Patient Vitals for the past 8 hrs:   BP Temp Temp src Pulse Resp SpO2 Weight   20 0743 131/83 97.7 °F (36.5 °C) Oral 55 18 95 % --   20 0436 -- -- -- -- -- -- 177 lb (80.3 kg)   20 0432 127/89 97.3 °F (36.3 °C) Oral 63 16 96 % --     Average, Min, and Max for last 24 hours Vitals:  TEMPERATURE:  Temp  Av.5 °F (36.4 °C)  Min: 96.8 °F (36 °C)  Max: 98.2 °F (36.8 °C)    RESPIRATIONS RANGE: Resp  Av.3  Min: 0  Max: 102    PULSE RANGE: Pulse  Av.1  Min: 48  Max: 66    BLOOD PRESSURE RANGE:  Systolic (83WMC), KTH:582 , Min:88 , IAR:214   ; Diastolic (81RMC), LPO:45, Min:52, Max:96      PULSE OXIMETRY RANGE: SpO2  Av %  Min: 93 %  Max: 100 %    I/O last 3 completed shifts: In: 3124.6 [P.O.:370; I.V.:2654.6; IV Piggyback:100]  Out: 5765 [Urine:1475]    CBC:  Recent Labs     20  0552   WBC 7.9 5.7   HGB 10.1* 10.6*   HCT 32.0* 34.3*    313   CRP 32.8*  --         BMP:  Recent Labs     20  0552    141   K 3.5* 4.2   CL 99 107   CO2 24 27   BUN 22 17   CREATININE 1.77* 1.39*   GLUCOSE 111* 120*   CALCIUM 8.6 8.2*        Coags:  Recent Labs     20  0552   INR 1.0       Lab Results   Component Value Date    SEDRATE 80 (H) 2020     Recent Labs     20   CRP 32.8*       Lower Extremity Physical Exam:    Vascular: DP and PT pulses are palpable Left. DP pulse palpable right non palpable let. CFT <4 seconds to all digits. Hair growth is absent to the level of the digits. 1+ pitting edema right foot. No edema left foot.      Neuro: Saph/sural/SP/DP/plantar sensation intact to light touch.     Musculoskeletal: Muscle strength is 5/5 to all lower extremity muscle groups. Gross deformity is present with contracted digits 1-5 bilat.     Dermatologic: Surgical incision from right 4th digit amputation with skin sutures intact.  No

## 2020-02-16 NOTE — PLAN OF CARE
Problem: Pain:  Goal: Pain level will decrease  Description  Pain level will decrease  2/16/2020 0347 by Saman Garcia RN  Outcome: Ongoing  Note:   Pt indicates adequate pain control from prn pain meds given     Problem: Falls - Risk of:  Goal: Will remain free from falls  Description  Will remain free from falls  2/16/2020 0347 by Saman Garcia RN  Outcome: Ongoing  Note:   Bed alarm on, call bell in reach, encouraged to use prn -- up with sba/boot, heel wt bearing only     Problem: Skin Integrity:  Goal: Will show no infection signs and symptoms  Description  Will show no infection signs and symptoms  2/16/2020 0347 by Saman Garcia RN  Outcome: Ongoing  Note:   Dressing to rt foot remains d&i

## 2020-02-16 NOTE — PROGRESS NOTES
included:     Xray:  Impression:   1. Radiographic findings most consistent with 4th digit acute osteomyelitis  with severe bone loss and surrounding cellulitis.  Recommend clinical  correlation.  Note: Radiographic evaluation is less sensitive in detection  and description of osteomyelitis comparison with MR modality. 2. 1st metatarsophalangeal joint arthrodesis without evidence of hardware  complication. 3. 3rd digit proximal interphalangeal moderate degenerative joint disease.      WBC 7.9 k/uL RBC 3.72Low m/uL Hemoglobin 10.1Low      Glucose 111High mg/dL      BUN 22 mg/dL   CREATININE 1. 77High      Results for Fauzia Julio" (MRN [id number]) as of 2/15/2020 13:15    Ref. Range 7/28/2017 17:43 8/31/2018 15:37 10/16/2018 06:40 11/9/2018 06:22 1/19/2019 13:22 2/15/2019 15:10 3/1/2019 12:03 2/14/2020 22:38   Creatinine Latest Ref Range: 0.70 - 1.20 mg/dL 1.30 (H) 1.22 (H) 1.41 (H) 1.17 2.52 (H) 2.27 (H) 2.09 (H) 1.77 (H)         Sed Rate 80High   CRP 32. 8High      Potassium 3.5Low      Results for Hank Gonzalez SR. \"ALICIA\" (MRN [id number]) as of 2/15/2020 13:15    Ref. Range 3/29/2013 08:23 1/12/2015 18:57 2/26/2016 16:35 7/28/2017 17:43 1/19/2019 13:22   T3, Free Latest Ref Range: 2.2 - 4.0 pg/mL 1.71 (L)           TSH Latest Ref Range: 0.30 - 5.00 mIU/L 0.73 2.47 1.59 12.13 (H) 19.54 (H)   Thyroxine, Free Latest Ref Range: 0.93 - 1.70 ng/dL 0.75 (L)     0.73 (L) 0.76 (L)      EKG:  Sinus bradycardia  Left axis deviation  Abnormal ECG  When compared with ECG of 09-NOV-2018 06:33,  No significant change was found     The patient was admitted   Podiatry was consulted  His pain has been managed  Renal function to be monitored   Anemia w/u on outpatient basis is suggested      The patient was admitted  On 2/15 he underwent:  PROCEDURE:   1. 4th digit amputation, right foot         Medications:      Allergies:  No Known Allergies    Current Meds:   Scheduled Meds:    ceftaroline fosamil (TEFLARO) IVPB  400 (24Hr): Intake/Output Summary (Last 24 hours) at 2/16/2020 1635  Last data filed at 2/16/2020 1244  Gross per 24 hour   Intake 2956.59 ml   Output 1750 ml   Net 1206.59 ml         Review of Systems:     Review of Systems   Respiratory: Negative for cough and shortness of breath. Cardiovascular: Negative for chest pain and palpitations. Gastrointestinal: Negative for abdominal pain, nausea and vomiting. Genitourinary: Negative for flank pain and hematuria. Musculoskeletal:        His foot pain is controlled   Skin: Positive for wound (Fourth toe incision). Physical Examination:        Physical Exam  Vitals signs reviewed. Constitutional:       General: He is not in acute distress. Appearance: He is not diaphoretic. HENT:      Head: Normocephalic. Nose: Nose normal.   Eyes:      General: No scleral icterus. Conjunctiva/sclera: Conjunctivae normal.   Neck:      Musculoskeletal: Neck supple. Trachea: No tracheal deviation. Cardiovascular:      Rate and Rhythm: Regular rhythm. Comments: Has been bradycardic at times  Pulmonary:      Effort: Pulmonary effort is normal. No respiratory distress. Breath sounds: Normal breath sounds. No wheezing or rales. Chest:      Chest wall: No tenderness. Abdominal:      General: Bowel sounds are normal. There is no distension. Palpations: Abdomen is soft. Tenderness: There is no abdominal tenderness. Musculoskeletal:         General: No tenderness. Right lower leg: Edema present. Skin:     General: Skin is warm and dry. Comments: Wound is dressed, dry and clean    Neurological:      Mental Status: He is alert and oriented to person, place, and time.            Labs:  Hematology:  Recent Labs     02/14/20  2238 02/16/20  0552   WBC 7.9 5.7   RBC 3.72* 3.90*   HGB 10.1* 10.6*   HCT 32.0* 34.3*   MCV 86.0 87.9   MCH 27.2 27.2   MCHC 31.6 30.9   RDW 12.8 13.2    313   MPV 10.2 10.3   SEDRATE 80*  --    CRP 32.8*  --    INR  --  1.0     Chemistry:  Recent Labs     02/14/20  2238 02/16/20  0552    141   K 3.5* 4.2   CL 99 107   CO2 24 27   GLUCOSE 111* 120*   BUN 22 17   CREATININE 1.77* 1.39*   ANIONGAP 12 7*   LABGLOM 38* 51*   GFRAA 46* >60   CALCIUM 8.6 8.2*     Recent Labs     02/15/20  1710   TSH 0.23*         Radiology:    Xr Foot Right (min 3 Views)    Result Date: 2/15/2020  1. Interim amputation of the right 4th toe. 2. Soft tissue swelling in the stump extends into the dorsal right forefoot, potentially postoperative edema and/or cellulitis. Foci of gas in the underlying soft tissues are likely postoperative. 3. Uncomplicated arthrodesis of the right 1st metatarsophalangeal joint. 4. Bony demineralization. Xr Foot Right (min 3 Views)    Result Date: 2/14/2020  1. Radiographic findings most consistent with 4th digit acute osteomyelitis with severe bone loss and surrounding cellulitis. Recommend clinical correlation. Note: Radiographic evaluation is less sensitive in detection and description of osteomyelitis comparison with MR modality. 2. 1st metatarsophalangeal joint arthrodesis without evidence of hardware complication. 3. 3rd digit proximal interphalangeal moderate degenerative joint disease.        Assessment:        Primary Problem  Osteomyelitis [facility])    Active Hospital Problems    Diagnosis Date Noted    Amputation of toe of right foot ([facility]): 2/15/2020 [S98.131A] 02/16/2020    Osteomyelitis ([facility] [M86.9] 02/15/2020    Bradycardia [R00.1] 02/15/2020    CKD (chronic kidney disease), stage III ([facility] [N18.3] 02/15/2020    Anemia, normocytic normochromic [D64.9] 02/15/2020    Hypertension [I10]     Hypothyroidism [E03.9]     Hepatitis C antibody positive in blood [R76.8] 01/01/2015       Plan:        Podiatry evaluation  Wound care  Antibiotics per Infectious Disease service  Blood Pressure - Monitor and control  Monitor bradycardia: Asymptomatic  Observe renal function  Monitor H&H and

## 2020-02-16 NOTE — OP NOTE
[id number] [facility] [address]                [address] [place] [address]                                OPERATIVE REPORT    PATIENT NAME: Jb Stevenson                    :        1950  MED REC NO:   [id number]                             ROOM:       [facility]  ACCOUNT NO:   [de-identified]                           ADMIT DATE: 2020  PROVIDER:     Shadia Jiménez    DATE OF PROCEDURE:  02/15/2020    SURGEON:  Shadia Jiménez DPM    ASSISTANT SURGEON:  Asuncion Weathers DPM, PGY-3    PREOPERATIVE DIAGNOSIS:  Osteomyelitis, right fourth toe. POSTOPERATIVE DIAGNOSIS:  Osteomyelitis, right fourth toe. PROCEDURE PERFORMED:  Amputation of right fourth toe at  metatarsophalangeal joint level. ANESTHESIA:  Local with IV sedation. HEMOSTASIS:  Pneumatic ankle tourniquet 250 mmHg. ESTIMATED BLOOD LOSS:  Less than 5 mL. MATERIALS:  None. INJECTABLES:  10 mL of 1:1 solution of 1% lidocaine plain and 0.5%  Marcaine plain. COMPLICATIONS:  None apparent. PATHOLOGY:  Right fourth toe sent for aerobic and anaerobic culture as  well as pathology. INDICATIONS FOR PROCEDURE:  The patient is a 63-year-old male who was  admitted to [facility] with osteomyelitis of his right fourth  toe. I had recommended amputation of the toe at metatarsophalangeal  joint level for infection control. I had lengthy discussion regarding  risks, benefits, and expected postoperative protocol. No guarantees  were given or implied regarding surgical outcome. All questions have  been answered to the patient's satisfaction. The patient had been in  n.p.o. status since midnight, the night before. PREOPERATIVE DETAILS:  Under mild sedation, the patient was brought back  to the operating room and placed on the OR table in supine position. IV  sedation was initiated by the Anesthesia Department. A well-padded  pneumatic ankle tourniquet was applied to the right ankle.   The  operative limb was then anesthetized with 10 mL of 1:1 solution of 1%  lidocaine plain and 0.5% Marcaine plain. The operative limb was then  prepped and draped in the usual aseptic fashion. Time-out was performed  to confirm the correct patient, correct site, correct procedure. Everyone in the room is in agreement. PROCEDURE IN DETAIL:  Sterile Esmarch was utilized to exsanguinate the  right foot. The pneumatic ankle tourniquet was inflated to 250 mmHg. Attention was directed to the right fourth toe where an incision was  made excising the ulceration of the dorsal aspect of the  metatarsophalangeal joint level. Blunt dissection was carried deep down  to the subcutaneous tissues with care being taken to retract all vital  neurovascular structures. Extensive destructive changes noted in all  three bones to the right fourth toe. The metatarsal head appeared  viable without any significant necrosis. At this point, we  disarticulated the toe with metatarsophalangeal joint level. All  bleeding vessels were ligated with electrocautery. The toe was sent for  pathology and culture. At this point, we flushed the wound with copious  amounts of sterile saline. At this point, all dirty gloves and  instruments were exchanged. We then performed layered cautery utilizing  3-0 Monocryl and 3-0 nylon suture. We were able to coapt the wound in  total.  At this point, dressings were applied to the right foot and the  pneumatic ankle tourniquet was deflated and prompt hyperemic response  was noted in all remaining digits of the right foot. The patient was  then awakened by the Anesthesia Department and returned to the recovery  room with vital signs stable. He appeared to tolerate the anesthesia  and procedure well. PLAN:  The patient will be heel touch weightbearing in surgical shoe,  right foot. We will have Infectious Disease consultation. He will need  antibiotics for clearance of the soft tissue infection.   I do not  anticipate any further surgery on this admission.         Yadiel Romero    D: 02/15/2020 14:52:43       T: 02/16/2020 0:17:14     [email or web address]  Job#: [id number]     Doc#: [id number]    CC:

## 2020-02-17 PROBLEM — Q60.0 SOLITARY KIDNEY, CONGENITAL: Status: ACTIVE | Noted: 2020-02-17

## 2020-02-17 LAB
CULTURE: ABNORMAL
CULTURE: ABNORMAL
DIRECT EXAM: ABNORMAL
EKG ATRIAL RATE: 55 BPM
EKG P AXIS: 15 DEGREES
EKG P-R INTERVAL: 178 MS
EKG Q-T INTERVAL: 460 MS
EKG QRS DURATION: 110 MS
EKG QTC CALCULATION (BAZETT): 440 MS
EKG R AXIS: -37 DEGREES
EKG T AXIS: 53 DEGREES
EKG VENTRICULAR RATE: 55 BPM
Lab: ABNORMAL
SPECIMEN DESCRIPTION: ABNORMAL

## 2020-02-17 PROCEDURE — 99232 SBSQ HOSP IP/OBS MODERATE 35: CPT | Performed by: INTERNAL MEDICINE

## 2020-02-17 PROCEDURE — 2580000003 HC RX 258: Performed by: INTERNAL MEDICINE

## 2020-02-17 PROCEDURE — 6360000002 HC RX W HCPCS: Performed by: STUDENT IN AN ORGANIZED HEALTH CARE EDUCATION/TRAINING PROGRAM

## 2020-02-17 PROCEDURE — 6370000000 HC RX 637 (ALT 250 FOR IP): Performed by: STUDENT IN AN ORGANIZED HEALTH CARE EDUCATION/TRAINING PROGRAM

## 2020-02-17 PROCEDURE — 97116 GAIT TRAINING THERAPY: CPT

## 2020-02-17 PROCEDURE — 93010 ELECTROCARDIOGRAM REPORT: CPT | Performed by: INTERNAL MEDICINE

## 2020-02-17 PROCEDURE — 2580000003 HC RX 258: Performed by: STUDENT IN AN ORGANIZED HEALTH CARE EDUCATION/TRAINING PROGRAM

## 2020-02-17 PROCEDURE — 6360000002 HC RX W HCPCS: Performed by: INTERNAL MEDICINE

## 2020-02-17 PROCEDURE — 97166 OT EVAL MOD COMPLEX 45 MIN: CPT

## 2020-02-17 PROCEDURE — 97530 THERAPEUTIC ACTIVITIES: CPT

## 2020-02-17 PROCEDURE — 97535 SELF CARE MNGMENT TRAINING: CPT

## 2020-02-17 PROCEDURE — 6370000000 HC RX 637 (ALT 250 FOR IP): Performed by: NURSE PRACTITIONER

## 2020-02-17 PROCEDURE — 1200000000 HC SEMI PRIVATE

## 2020-02-17 PROCEDURE — 97110 THERAPEUTIC EXERCISES: CPT

## 2020-02-17 RX ORDER — LANOLIN ALCOHOL/MO/W.PET/CERES
3 CREAM (GRAM) TOPICAL NIGHTLY PRN
Status: DISCONTINUED | OUTPATIENT
Start: 2020-02-17 | End: 2020-02-18 | Stop reason: HOSPADM

## 2020-02-17 RX ORDER — HYDRALAZINE HYDROCHLORIDE 20 MG/ML
10 INJECTION INTRAMUSCULAR; INTRAVENOUS ONCE
Status: COMPLETED | OUTPATIENT
Start: 2020-02-18 | End: 2020-02-18

## 2020-02-17 RX ORDER — HYDROCODONE BITARTRATE AND ACETAMINOPHEN 5; 325 MG/1; MG/1
1 TABLET ORAL EVERY 6 HOURS PRN
Qty: 20 TABLET | Refills: 0 | Status: SHIPPED | OUTPATIENT
Start: 2020-02-17 | End: 2020-02-22

## 2020-02-17 RX ADMIN — LEVOTHYROXINE SODIUM 175 MCG: 0.03 TABLET ORAL at 04:55

## 2020-02-17 RX ADMIN — PANTOPRAZOLE SODIUM 40 MG: 40 TABLET, DELAYED RELEASE ORAL at 04:56

## 2020-02-17 RX ADMIN — OXYCODONE AND ACETAMINOPHEN 2 TABLET: 5; 325 TABLET ORAL at 04:54

## 2020-02-17 RX ADMIN — CARVEDILOL 6.25 MG: 6.25 TABLET, FILM COATED ORAL at 16:46

## 2020-02-17 RX ADMIN — TAMSULOSIN HYDROCHLORIDE 0.4 MG: 0.4 CAPSULE ORAL at 09:10

## 2020-02-17 RX ADMIN — CEFTAROLINE FOSAMIL 400 MG: 600 POWDER, FOR SOLUTION INTRAVENOUS at 01:13

## 2020-02-17 RX ADMIN — CEFTAROLINE FOSAMIL 400 MG: 600 POWDER, FOR SOLUTION INTRAVENOUS at 13:09

## 2020-02-17 RX ADMIN — ENOXAPARIN SODIUM 40 MG: 40 INJECTION SUBCUTANEOUS at 09:10

## 2020-02-17 RX ADMIN — OXYCODONE AND ACETAMINOPHEN 2 TABLET: 5; 325 TABLET ORAL at 09:09

## 2020-02-17 RX ADMIN — ASPIRIN 81 MG: 81 TABLET, COATED ORAL at 09:09

## 2020-02-17 RX ADMIN — SODIUM CHLORIDE: 9 INJECTION, SOLUTION INTRAVENOUS at 09:09

## 2020-02-17 RX ADMIN — HYDROCHLOROTHIAZIDE 25 MG: 25 TABLET ORAL at 09:10

## 2020-02-17 RX ADMIN — OXYCODONE HYDROCHLORIDE AND ACETAMINOPHEN 500 MG: 500 TABLET ORAL at 09:09

## 2020-02-17 RX ADMIN — CARVEDILOL 6.25 MG: 6.25 TABLET, FILM COATED ORAL at 09:18

## 2020-02-17 RX ADMIN — OXYCODONE AND ACETAMINOPHEN 2 TABLET: 5; 325 TABLET ORAL at 19:43

## 2020-02-17 RX ADMIN — CHOLECALCIFEROL CAP 125 MCG (5000 UNIT) 5000 UNITS: 125 CAP at 09:09

## 2020-02-17 ASSESSMENT — ENCOUNTER SYMPTOMS
COUGH: 0
NAUSEA: 0
ABDOMINAL PAIN: 0
ALLERGIC/IMMUNOLOGIC NEGATIVE: 1
VOMITING: 0
GASTROINTESTINAL NEGATIVE: 1
SHORTNESS OF BREATH: 0
RESPIRATORY NEGATIVE: 1

## 2020-02-17 ASSESSMENT — PAIN DESCRIPTION - PAIN TYPE
TYPE: ACUTE PAIN
TYPE: SURGICAL PAIN
TYPE: SURGICAL PAIN

## 2020-02-17 ASSESSMENT — PAIN DESCRIPTION - DESCRIPTORS
DESCRIPTORS: ACHING;CONSTANT
DESCRIPTORS: ACHING
DESCRIPTORS: ACHING;SHARP

## 2020-02-17 ASSESSMENT — PAIN DESCRIPTION - LOCATION
LOCATION: FOOT

## 2020-02-17 ASSESSMENT — PAIN DESCRIPTION - ORIENTATION
ORIENTATION: RIGHT
ORIENTATION: RIGHT

## 2020-02-17 ASSESSMENT — PAIN DESCRIPTION - ONSET: ONSET: ON-GOING

## 2020-02-17 ASSESSMENT — PAIN DESCRIPTION - FREQUENCY
FREQUENCY: INTERMITTENT
FREQUENCY: CONTINUOUS

## 2020-02-17 ASSESSMENT — PAIN SCALES - GENERAL
PAINLEVEL_OUTOF10: 7
PAINLEVEL_OUTOF10: 8
PAINLEVEL_OUTOF10: 7
PAINLEVEL_OUTOF10: 10

## 2020-02-17 ASSESSMENT — PAIN DESCRIPTION - PROGRESSION: CLINICAL_PROGRESSION: NOT CHANGED

## 2020-02-17 ASSESSMENT — PAIN - FUNCTIONAL ASSESSMENT: PAIN_FUNCTIONAL_ASSESSMENT: ACTIVITIES ARE NOT PREVENTED

## 2020-02-17 NOTE — PROGRESS NOTES
Gastrointestinal: Negative for abdominal pain, nausea and vomiting. Genitourinary: Negative for flank pain and hematuria. Musculoskeletal:        His foot pain is controlled   Skin: Positive for wound (Fourth toe incision). Neurological: Negative for dizziness, syncope and weakness. Physical Examination:        Physical Exam  Vitals signs reviewed. Constitutional:       General: He is not in acute distress. Appearance: He is not diaphoretic. HENT:      Head: Normocephalic. Nose: Nose normal.   Eyes:      General: No scleral icterus. Conjunctiva/sclera: Conjunctivae normal.   Neck:      Musculoskeletal: Neck supple. Trachea: No tracheal deviation. Cardiovascular:      Rate and Rhythm: Regular rhythm. Comments: Has been bradycardic at times  Pulmonary:      Effort: Pulmonary effort is normal. No respiratory distress. Breath sounds: Normal breath sounds. No wheezing or rales. Chest:      Chest wall: No tenderness. Abdominal:      General: Bowel sounds are normal. There is no distension. Palpations: Abdomen is soft. Tenderness: There is no abdominal tenderness. Musculoskeletal:         General: No tenderness. Right lower leg: Edema present. Skin:     General: Skin is warm and dry. Comments: Wound is dressed, dry and clean    Neurological:      Mental Status: He is alert and oriented to person, place, and time.            Labs:  Hematology:  Recent Labs     02/14/20 2238 02/16/20  0552   WBC 7.9 5.7   RBC 3.72* 3.90*   HGB 10.1* 10.6*   HCT 32.0* 34.3*   MCV 86.0 87.9   MCH 27.2 27.2   MCHC 31.6 30.9   RDW 12.8 13.2    313   MPV 10.2 10.3   SEDRATE 80*  --    CRP 32.8*  --    INR  --  1.0     Chemistry:  Recent Labs     02/14/20 2238 02/16/20  0552    141   K 3.5* 4.2   CL 99 107   CO2 24 27   GLUCOSE 111* 120*   BUN 22 17   CREATININE 1.77* 1.39*   ANIONGAP 12 7*   LABGLOM 38* 51*   GFRAA 46* >60   CALCIUM 8.6 8.2*     Recent Labs 02/15/20  1710   TSH 0.23*         Radiology:    Xr Foot Right (min 3 Views)    Result Date: 2/15/2020  1. Interim amputation of the right 4th toe. 2. Soft tissue swelling in the stump extends into the dorsal right forefoot, potentially postoperative edema and/or cellulitis. Foci of gas in the underlying soft tissues are likely postoperative. 3. Uncomplicated arthrodesis of the right 1st metatarsophalangeal joint. 4. Bony demineralization. Xr Foot Right (min 3 Views)    Result Date: 2/14/2020  1. Radiographic findings most consistent with 4th digit acute osteomyelitis with severe bone loss and surrounding cellulitis. Recommend clinical correlation. Note: Radiographic evaluation is less sensitive in detection and description of osteomyelitis comparison with MR modality. 2. 1st metatarsophalangeal joint arthrodesis without evidence of hardware complication. 3. 3rd digit proximal interphalangeal moderate degenerative joint disease.        Assessment:        Primary Problem  Osteomyelitis [facility])    Active Hospital Problems    Diagnosis Date Noted    Amputation of toe of right foot ([facility]): 2/15/2020 [S98.131A] 02/16/2020    Osteomyelitis ([facility] [M86.9] 02/15/2020    Bradycardia [R00.1] 02/15/2020    CKD (chronic kidney disease), stage III (HCC) [N18.3] 02/15/2020    Anemia, normocytic normochromic [D64.9] 02/15/2020    Hypertension [I10]     Hypothyroidism [E03.9]     Hepatitis C antibody positive in blood [R76.8] 01/01/2015       Plan:        Podiatry evaluation  Wound care  Antibiotics per Infectious Disease service  Blood Pressure - Monitor and control  Monitor bradycardia: Asymptomatic  Observe renal function  Monitor H&H and transfuse as needed  Thyroid replacement    Social service consult  DVT prophylaxis  Discharge planning  The patient was hoping to go home  PT recommending placement  Anticipate skilled nursing facility  Will discharge when arrangements complete and ok with other services.   Follow-up with PCP in one week, Timmy Jaquez MD  Notify PCP of discharge     IP CONSULT TO INTERNAL MEDICINE  IP CONSULT TO PODIATRY  IP CONSULT TO PODIATRY  IP CONSULT TO SOCIAL WORK  IP CONSULT TO INFECTIOUS DISEASES    Zenobia Baker DO  2/17/2020  1:36 PM

## 2020-02-17 NOTE — PROGRESS NOTES
IBILI, BILITOT, ALKPHOS, ALT, AST in the last 72 hours. No results for input(s): VANCOTROUGH in the last 72 hours. Lab Results   Component Value Date    CRP 32.8 (H) 02/14/2020     Lab Results   Component Value Date    SEDRATE 80 (H) 02/14/2020       No results for input(s): PROCAL in the last 72 hours. Imaging Studies:   No new imaging    Cultures:     Tissue Culture [[id number]] (Abnormal) Collected: 02/15/20 1427   Order Status: Completed Specimen: Tissue from Toe Updated: 02/16/20 2209    Specimen Description . TOE    Special Requests CULTURE    Direct Exam MANY NEUTROPHILSAbnormal      FEW GRAM POSITIVE COCCI IN PAIRSAbnormal      RARE GRAM POSITIVE COCCI IN CLUSTERSAbnormal     Culture STAPHYLOCOCCUS AUREUS LIGHT GROWTHAbnormal          Medications:      ceftaroline fosamil (TEFLARO) IVPB  400 mg Intravenous Q12H    aspirin  81 mg Oral Daily    carvedilol  6.25 mg Oral BID WC    hydrochlorothiazide  25 mg Oral Daily    levothyroxine  175 mcg Oral Daily    pantoprazole  40 mg Oral QAM AC    tamsulosin  0.4 mg Oral Daily    vitamin C  500 mg Oral Daily    vitamin D  5,000 Units Oral Daily    sodium chloride flush  10 mL Intravenous 2 times per day    enoxaparin  40 mg Subcutaneous Daily    oxyCODONE-acetaminophen  1 tablet Oral Once           [facility]  Jessica Mcdonald MD  [employer]  OFFICE: [phone]      Electronically signed by Jessica Mcdonald MD on 2/17/2020 at 10:17 AM  Thank you for allowing us to participate in the care of this patient. Please call with questions. This note iscreated with the assistance of a speech recognition program.  While intending to generate a document that actually reflects the content of the visit, the document can still have some errors including those of syntax andsound a like substitutions which may escape proof reading. In such instances, actual meaning can be extrapolated by contextual diversion.

## 2020-02-17 NOTE — PROGRESS NOTES
Progress Note  Podiatric Medicine and Surgery     Subjective     CC: Right foot wound     Patient seen and examined at bedside. POD#2 s/p right 4th digit amputation. No acute events overnight. Afebrile, vital signs stable   Denies pain today  Tolerating diet   Culture sensitivities pending    HPI :  Kori Espinosa is a non-diabetic 71 y.o. male seen at bedside for evaluation of right foot wound. Dr. Belle Cruz recommended that the patient present to the ER today for evaluation when seen in his clinic. Patient presents with his wife who assists in providing some of the history. The patient states the wound started about a week ago after he dropped a table on his foot. He continues to have increased swelling and right foot pain. Upon evaluation and xrays in Dr. Lalit Anthony office patient was noted to have osteomyelitis of the 4th digit right foot. Patient only reports pain. He is non diabetic. He has a history of previous R 1st MPJ fusion performed approx 1 year ago. Denies other pedal complaints. PCP is Titi Sampson MD    ROS: Denies N/V/F/C/SOB/CP. Otherwise negative except at stated in the HPI.      Medications:  Scheduled Meds:   ceftaroline fosamil (TEFLARO) IVPB  400 mg Intravenous Q12H    aspirin  81 mg Oral Daily    carvedilol  6.25 mg Oral BID WC    hydrochlorothiazide  25 mg Oral Daily    levothyroxine  175 mcg Oral Daily    pantoprazole  40 mg Oral QAM AC    tamsulosin  0.4 mg Oral Daily    vitamin C  500 mg Oral Daily    vitamin D  5,000 Units Oral Daily    sodium chloride flush  10 mL Intravenous 2 times per day    enoxaparin  40 mg Subcutaneous Daily    oxyCODONE-acetaminophen  1 tablet Oral Once       Continuous Infusions:   sodium chloride 75 mL/hr at 02/17/20 0909       PRN Meds:sodium chloride flush, potassium chloride **OR** potassium alternative oral replacement **OR** potassium chloride, magnesium sulfate, magnesium hydroxide, nicotine, acetaminophen, ondansetron **OR** ondansetron, oxyCODONE-acetaminophen **OR** oxyCODONE-acetaminophen    Objective     Vitals:  Patient Vitals for the past 8 hrs:   BP Temp Temp src Pulse Resp SpO2 Weight   20 0918 -- -- -- 55 -- -- --   20 0814 (!) 173/95 97.3 °F (36.3 °C) Oral 50 16 95 % --   20 0452 -- -- -- -- -- -- 175 lb (79.4 kg)     Average, Min, and Max for last 24 hours Vitals:  TEMPERATURE:  Temp  Av.1 °F (36.7 °C)  Min: 97.3 °F (36.3 °C)  Max: 98.5 °F (36.9 °C)    RESPIRATIONS RANGE: Resp  Av.8  Min: 16  Max: 18    PULSE RANGE: Pulse  Av.2  Min: 50  Max: 62    BLOOD PRESSURE RANGE:  Systolic (66ZDX), CRA:936 , Min:116 , RVD:601   ; Diastolic (17OIW), SGC:53, Min:71, Max:95      PULSE OXIMETRY RANGE: SpO2  Av.8 %  Min: 95 %  Max: 98 %    I/O last 3 completed shifts: In: 2812 [P.O.:480; I.V.:2239]  Out: 1875 [Urine:1875]    CBC:  Recent Labs     20  0552   WBC 7.9 5.7   HGB 10.1* 10.6*   HCT 32.0* 34.3*    313   CRP 32.8*  --         BMP:  Recent Labs     20  0552    141   K 3.5* 4.2   CL 99 107   CO2 24 27   BUN 22 17   CREATININE 1.77* 1.39*   GLUCOSE 111* 120*   CALCIUM 8.6 8.2*        Coags:  Recent Labs     20  0552   INR 1.0       Lab Results   Component Value Date    SEDRATE 80 (H) 2020     Recent Labs     20   CRP 32.8*       Lower Extremity Physical Exam:    Vascular: DP and PT pulses are palpable Left. DP pulse palpable right non palpable let. CFT <4 seconds to all digits. Hair growth is absent to the level of the digits. 1+ pitting edema right foot. No edema left foot.      Neuro: Saph/sural/SP/DP/plantar sensation intact to light touch.     Musculoskeletal: Muscle strength is 5/5 to all lower extremity muscle groups. Gross deformity is present with contracted digits 1-5 bilat.     Dermatologic: Surgical incision from right 4th digit amputation with skin sutures intact. No purulence or signs of infection.  Edema is improved. Chronic discoloration of RLE due to edema. Clinical Images:  None    Imaging:   XR FOOT RIGHT (MIN 3 VIEWS)   Final Result   1. Interim amputation of the right 4th toe.   2. Soft tissue swelling in the stump extends into the dorsal right forefoot,   potentially postoperative edema and/or cellulitis. Foci of gas in the   underlying soft tissues are likely postoperative. 3. Uncomplicated arthrodesis of the right 1st metatarsophalangeal joint. 4. Bony demineralization. XR FOOT RIGHT (MIN 3 VIEWS)   Final Result   1. Radiographic findings most consistent with 4th digit acute osteomyelitis   with severe bone loss and surrounding cellulitis. Recommend clinical   correlation. Note: Radiographic evaluation is less sensitive in detection   and description of osteomyelitis comparison with MR modality. 2. 1st metatarsophalangeal joint arthrodesis without evidence of hardware   complication. 3. 3rd digit proximal interphalangeal moderate degenerative joint disease. Cultures:   Intraop tissue cx: GPC    Assessment   Robin Rashid Sr. is a 71 y.o. male with   1. Osteomyelitis proximal phalanx of 4th digit, right foot   2. S/p R 4th toe amputation (DOS: 2/15/2020)  3. Wound right foot  4. Cellulitis, right foot     Principal Problem:    Osteomyelitis (HCC)  Active Problems:    Hepatitis C antibody positive in blood    Hypertension    Hypothyroidism    Bradycardia    CKD (chronic kidney disease), stage III (HCC)    Anemia, normocytic normochromic    Amputation of toe of right foot ([facility]): 2/15/2020  Resolved Problems:    * No resolved hospital problems. *       Plan     · Patient examined and evaluated at bedside with Dr. Arjun Gordillo  · Treatment options discussed in detail with the patient  · Medical management per IM  · ID following   · Dressing changed to Right foot: adaptic, DSD ACE  · Minimal WBAT to Right lower extremity in surgical shoe. Keep dressing clean, dry and intact. · Patient ok for discharge from podiatry standpoint once ID has made antibiotic recommendations. · Follow up with Dr. Antoine Stephens within 1 week from discharge.      Shazia Katz DPM   Podiatric Medicine & Surgery   2/17/2020 at 10:39 AM

## 2020-02-17 NOTE — PLAN OF CARE
[address] [place] [address]       Second Visit Note  For more detailed information please refer to the progress note of the day      2/17/2020    2:39 PM    Name:   You De Los Santos   MRN:     [id number]     Acct:      [de-identified]   Room:   2003/2003-02  IP Day:  2  Admit Date:  2/14/2020  9:49 PM    PCP:   Mer Titus MD  Code Status:  Full Code    Patient has been seen    Physical therapy evaluation noted:  Discharge Recommendations:  [address] [facility], Continue to assess pending progress     CURRENT MEDS:   ceftaroline fosamil (TEFLARO) 400 mg in dextrose 5 % 50 mL IVPB, Q12H  aspirin EC tablet 81 mg, Daily  carvedilol (COREG) tablet 6.25 mg, BID WC  hydrochlorothiazide (HYDRODIURIL) tablet 25 mg, Daily  levothyroxine (SYNTHROID) tablet 175 mcg, Daily  pantoprazole (PROTONIX) tablet 40 mg, QAM AC  tamsulosin (FLOMAX) capsule 0.4 mg, Daily  vitamin C (ASCORBIC ACID) tablet 500 mg, Daily  vitamin D (CHOLECALCIFEROL) capsule 5,000 Units, Daily  sodium chloride flush 0.9 % injection 10 mL, 2 times per day  sodium chloride flush 0.9 % injection 10 mL, PRN  potassium chloride (KLOR-CON M) extended release tablet 40 mEq, PRN    Or  potassium bicarb-citric acid (EFFER-K) effervescent tablet 40 mEq, PRN    Or  potassium chloride 10 mEq/100 mL IVPB (Peripheral Line), PRN  magnesium sulfate 1 g in dextrose 5% 100 mL IVPB, PRN  magnesium hydroxide (MILK OF MAGNESIA) 400 MG/5ML suspension 30 mL, Daily PRN  nicotine (NICODERM CQ) 21 MG/24HR 1 patch, Daily PRN  acetaminophen (TYLENOL) tablet 650 mg, Q4H PRN  0.9 % sodium chloride infusion, Continuous  enoxaparin (LOVENOX) injection 40 mg, Daily  ondansetron (ZOFRAN-ODT) disintegrating tablet 4 mg, Q6H PRN    Or  ondansetron (ZOFRAN) injection 4 mg, Q6H PRN  oxyCODONE-acetaminophen (PERCOCET) 5-325 MG per tablet 1 tablet, Once  oxyCODONE-acetaminophen (PERCOCET) 5-325 MG per tablet 1 tablet, Q4H PRN    Or  oxyCODONE-acetaminophen (PERCOCET) 5-325 MG per tablet 2 tablet, Q4H PRN        VITALS:  BP (!) 173/95   Pulse 55   Temp 97.3 °F (36.3 °C) (Oral)   Resp 16   Ht 5' 8\" (1.727 m)   Wt 175 lb (79.4 kg)   SpO2 95%   BMI 26.61 kg/m²     LABS:   [unfilled]    PROBLEMS:  Principal Problem:    Osteomyelitis (HCC)  Active Problems:    Hepatitis C antibody positive in blood    Hypertension    Hypothyroidism    Bradycardia    CKD (chronic kidney disease), stage III (HCC)    Anemia, normocytic normochromic    Amputation of toe of right foot ([facility]): 2/15/2020  Resolved Problems:    * No resolved hospital problems.  *      UPDATED PLAN:  See current orders  Patient now considering placement  Precertification initiated    Patient clarified history of solitary kidney  He reports that he was born with only 1 kidney    IP CONSULT TO INTERNAL MEDICINE  IP CONSULT TO PODIATRY  IP CONSULT TO PODIATRY  IP CONSULT TO SOCIAL WORK  IP CONSULT TO INFECTIOUS DISEASES    Mj Corea DO  2/17/2020  2:39 PM

## 2020-02-17 NOTE — PROGRESS NOTES
Physical Therapy  Facility/Department: [facility]  Daily Treatment Note  NAME: Lokesh Graham.  : 1950  MRN: [id number]    Date of Service: 2020    Discharge Recommendations:  Subacute/Skilled Nursing Facility, Continue to assess pending progress        Assessment   Body structures, Functions, Activity limitations: Decreased functional mobility ; Decreased strength;Decreased endurance; Increased pain;Decreased balance  Assessment: Pt demo'd improvements in endurance and mobility. However safety is a concern due to impulsive movments and poor carry over of ambulation sequencing. Recommending SNF/Continue to assess at this time. PT Education: Home Exercise Program;Transfer Training;General Safety;Weight-bearing Education;Gait Training;Plan of Care  Activity Tolerance  Activity Tolerance: Patient Tolerated treatment well  Activity Tolerance: Pt demo'd good endurance with ambulation and seated exercises. Pt reported standing exercises to be tiring but tolerable. Patient Diagnosis(es): The primary encounter diagnosis was Osteomyelitis of right foot, unspecified type ([address] A diagnosis of Acute post-operative pain was also pertinent to this visit. has a past medical history of Arthritis, Epilepsy ([address] Family history of diabetes mellitus in brother, Former cigarette smoker, Full dentures, Hepatitis C antibody positive in blood, Hypertension, Hypothyroidism, Pneumonia, Renal agenesis, Spider bite, Urinary retention, and Wears glasses. has a past surgical history that includes Facial cosmetic surgery; Hand surgery; Colonoscopy; skin split graft (2016); eye surgery (Bilateral); arthrodesis (Right, 2018); pr office/outpt visit,procedure only (Right, 2018); pr office/outpt visit,procedure only (Right, 2018); Toe amputation (Right, 02/15/2020); and Toe amputation (Right, 2/15/2020).     Restrictions  Restrictions/Precautions  Restrictions/Precautions: Weight Bearing, Fall Risk, General Precautions, Surgical Protocols, Up as Tolerated  Lower Extremity Weight Bearing Restrictions  Right Lower Extremity Weight Bearing: (HEEL TOUCH WB in SURGICAL SHOE)  Position Activity Restriction  Other position/activity restrictions: Valorie Strickladn at home last week- states first fall   Subjective   General  Chart Reviewed: Yes  Additional Pertinent Hx: OA, Hep C  Family / Caregiver Present: No  Subjective  Subjective: Pt reports pain as a 7/10 upon arrival, checked with RN Chidi Loza and she stated he had just recieved pain medication. Orientation  Orientation  Overall Orientation Status: Within Normal Limits  Cognition      Objective   Bed mobility  Supine to Sit: Independent  Transfers  Sit to Stand: Stand by assistance  Stand to sit: Stand by assistance  Lateral Transfers: Stand by assistance  Comment: VCs for Pt to push up from the bed and to keep RW in front of him when standing up. When transfering from stand<>sit Pt needed cues to reach back for the arm rests and to slowly lower into the chair. Ambulation  Ambulation?: Yes  WB Status: heel touch   Ambulation 1  Surface: level tile  Device: Rolling Walker  Other Apparatus: (IV pole )  Assistance: Contact guard assistance  Quality of Gait: Long steps with R leg and shorter steps with L leg demonstrating a quick step to pattern. Gait Deviations: Decreased step length; Increased LORRIE  Distance: 100'   Comments: Pt donned R surgical shoe and normal L shoe. Pt needed multiple VCs to stay within the RW and to slow down his gait pattern. Pt was educated on proper turn sequencing with RW with emphasis on keeping the walker close to him. Stairs/Curb  Stairs?: No     Balance  Posture: Good  Sitting - Static: Good  Sitting - Dynamic: Good  Standing - Static: Good;-  Standing - Dynamic: Good  Exercises  Comments: seated marching, LAQ, heel slides with resistance and hip abduction x10 reps. Standing hip extension with L leg as stance leg.  2 sets of 5 reps

## 2020-02-17 NOTE — PLAN OF CARE
Problem: Pain:  Goal: Control of acute pain  Description  Control of acute pain  2/17/2020 1405 by Pleas Brunner, RN  Outcome: Met This Shift     Problem: Falls - Risk of:  Goal: Will remain free from falls  Description  Will remain free from falls  2/17/2020 1405 by Pleas Brunner, RN  Outcome: Met This Shift     Problem: Pain:  Goal: Pain level will decrease  Description  Pain level will decrease  2/17/2020 1405 by Pleas Brunner, RN  Outcome: Ongoing     Problem: Pain:  Goal: Control of chronic pain  Description  Control of chronic pain  2/17/2020 1405 by Pleas Brunner, RN  Outcome: Ongoing     Problem: Falls - Risk of:  Goal: Absence of physical injury  Description  Absence of physical injury  2/17/2020 1405 by Pleas Brunner, RN  Outcome: Ongoing     Problem: Skin Integrity:  Goal: Will show no infection signs and symptoms  Description  Will show no infection signs and symptoms  2/17/2020 1405 by Pleas Brunner, RN  Outcome: Ongoing     Problem: Skin Integrity:  Goal: Absence of new skin breakdown  Description  Absence of new skin breakdown  2/17/2020 1405 by Pleas Brunner, RN  Outcome: Ongoing     Problem: Skin Integrity:  Goal: Absence of new skin breakdown  Description  Absence of new skin breakdown  2/17/2020 1405 by Pleas Brunner, RN  Outcome: Ongoing

## 2020-02-17 NOTE — DISCHARGE INSTR - COC
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    Spider bite T63.301A    Skin necrosis (HCC) I96    Osteomyelitis (HCC) M86.9    Hepatitis C antibody positive in blood R76.8    Hypertension I10    Hypothyroidism E03.9    Benign prostatic hyperplasia without lower urinary tract symptoms N40.0    Vitamin D deficiency E55.9    Bradycardia R00.1    CKD (chronic kidney disease), stage III (HCC) N18.3    Anemia, normocytic normochromic D64.9    Amputation of toe of right foot ([facility]): 2/15/2020 L86.745A       Isolation/Infection:   Isolation          No Isolation        Patient Infection Status     None to display          Nurse Assessment:  Last Vital Signs: BP (!) 173/95   Pulse 55   Temp 97.3 °F (36.3 °C) (Oral)   Resp 16   Ht 5' 8\" (1.727 m)   Wt 175 lb (79.4 kg)   SpO2 95%   BMI 26.61 kg/m²     Last documented pain score (0-10 scale): Pain Level: 7  Last Weight:   Wt Readings from Last 1 Encounters:   02/17/20 175 lb (79.4 kg)     Mental Status:  oriented, alert and coherent    IV Access:  - None    Nursing Mobility/ADLs:  Walking   Assisted  Transfer  Assisted  Bathing  Assisted  Dressing  Independent  Toileting  Independent  Feeding  Independent  Med Admin  Assisted  Med Delivery   whole    Wound Care Documentation and Therapy:keep rt foot dressing clean dry and intact until has follow up appt with DrKish        Elimination:  Continence:   · Bowel:  Yes  · Bladder: Yes  Urinary Catheter: None   Colostomy/Ileostomy/Ileal Conduit: No       Date of Last BM: 2/14/2020    Intake/Output Summary (Last 24 hours) at 2/17/2020 1157  Last data filed at 2/17/2020 1100  Gross per 24 hour   Intake 2839 ml   Output 2175 ml   Net 664 ml     I/O last 3 completed shifts: In: 2719 [P.O.:480; I.V.:2239]  Out: 1875 [Urine:1875]    Safety Concerns: At Risk for Falls    Impairments/Disabilities:      Post-op Right 4th toe amputation. Nutrition Therapy:  Current Nutrition Therapy:   - Oral Diet:  Cardiac    Routes of Feeding: Oral  Liquids: No Restrictions  Daily Fluid Restriction: no  Last Modified Barium Swallow with Video (Video Swallowing Test): not done    Treatments at the Time of Hospital Discharge:   Respiratory Treatments: n/a  Oxygen Therapy:  is not on home oxygen therapy. Ventilator:    - No ventilator support    Rehab Therapies: Physical Therapy and Occupational Therapy  Weight Bearing Status/Restrictions: Partial weight bearing (30-50%) only on leg right leg  Other Medical Equipment (for information only, NOT a DME order):  cane  Other Treatments: Minimal WBAT to Right lower extremity in surgical shoe. Keep dressing clean, dry and intact.  [facility] app[facility] for Feb 25th, 2020 at 1:10 pm, follow up with Kelly on Wednesday March 4th at 9:30 am    Patient's personal belongings (please select all that are sent with patient):  None    RN SIGNATURE:  Electronically signed by Lyndsey Currie RN on 2/17/20 at 12:03 PM    CASE MANAGEMENT/SOCIAL WORK SECTION    Inpatient Status Date: ***    Readmission Risk Assessment Score:  Readmission Risk              Risk of Unplanned Readmission:        18           Discharging to Facility/ Agency   · Name: [facility]  · Address:  · Phone:[phone]  · Fax:[phone]    Dialysis Facility (if applicable)   · Name:  · Address:  · Dialysis Schedule:  · Phone:  · Fax:    / signature: Electronically signed by LATRICIA Govea on 2/18/20 at 1:55 PM    PHYSICIAN SECTION    Prognosis: Fair    Condition at Discharge: Stable    Rehab

## 2020-02-17 NOTE — PROGRESS NOTES
Occupational Therapy   Occupational Therapy Initial Assessment  Date: 2020   Patient Name: Jovanna Hairston. MRN: [id number]     : 1950    Date of Service: 2020    Discharge Recommendations:  [address], Continue to assess pending progress     RN reports patient is medically stable for therapy treatment this date. Chart reviewed prior to treatment and patient is agreeable for therapy. All lines intact and patient positioned comfortably at end of treatment. All patient needs addressed prior to ending therapy session. Assessment   Performance deficits / Impairments: Decreased functional mobility ; Decreased ADL status; Decreased strength;Decreased safe awareness;Decreased balance;Decreased sensation;Decreased endurance;Decreased cognition  Prognosis: Good  Decision Making: Medium Complexity  OT Education: OT Role;Plan of Care;Energy Conservation;Home Exercise Program;Family Education;Precautions; ADL Adaptive Strategies; Equipment;Transfer Training  REQUIRES OT FOLLOW UP: Yes  Activity Tolerance  Activity Tolerance: Patient Tolerated treatment well  Safety Devices  Safety Devices in place: Yes  Type of devices: Call light within reach;Nurse notified;Gait belt;Patient at risk for falls; Left in bed;Bed alarm in place           Patient Diagnosis(es): The primary encounter diagnosis was Osteomyelitis of right foot, unspecified type ([address] A diagnosis of Acute post-operative pain was also pertinent to this visit. has a past medical history of Arthritis, Epilepsy ([address] Family history of diabetes mellitus in brother, Former cigarette smoker, Full dentures, Hepatitis C antibody positive in blood, Hypertension, Hypothyroidism, Pneumonia, Renal agenesis, Spider bite, Urinary retention, and Wears glasses. has a past surgical history that includes Facial cosmetic surgery; Hand surgery;  Colonoscopy; skin split graft (2016); eye surgery (Bilateral); arthrodesis (Right, 09/07/2018); pr office/outpt visit,procedure only (Right, 9/7/2018); pr office/outpt visit,procedure only (Right, 11/9/2018); Toe amputation (Right, 02/15/2020); and Toe amputation (Right, 2/15/2020). Restrictions  Restrictions/Precautions  Restrictions/Precautions: Weight Bearing, Fall Risk, General Precautions, Surgical Protocols, Up as Tolerated  Lower Extremity Weight Bearing Restrictions  Right Lower Extremity Weight Bearing: (HEEL TOUCH WB in SURGICAL SHOE)  Position Activity Restriction  Other position/activity restrictions: Tenisha Curtis at home last week- states first fall     Subjective   General  Patient assessed for rehabilitation services?: Yes  Vital Signs  Temp: 97.7 °F (36.5 °C)  Temp Source: Oral  Pulse: 51  Heart Rate Source: Monitor  Resp: 16  BP: (!) 152/92  BP Location: Left Arm  BP Upper/Lower: Upper  MAP (mmHg): 104  Oxygen Therapy  SpO2: 99 %  O2 Device: None (Room air)  Social/Functional History  Social/Functional History  Lives With: Spouse, Family  Type of Home: House  Home Layout: Multi-level, Bed/Bath upstairs, 1/2 bath on main level  Home Access: Stairs to enter with rails  Entrance Stairs - Number of Steps: 3  Entrance Stairs - Rails: Left  Bathroom Shower/Tub: Tub/Shower unit  Bathroom Toilet: Standard  ADL Assistance: Independent  Homemaking Assistance: Independent  Ambulation Assistance: Independent  Transfer Assistance: Independent  Active : No  Occupation: Retired  Type of occupation: [employer]  Leisure & Hobbies: TV  Additional Comments: does report fall from 3 weeks ago       Objective   Vision: Impaired  Vision Exceptions: Wears glasses at all times  Hearing: Exceptions to [facility]  Hearing Exceptions: Hard of hearing/hearing concerns    Orientation  Overall Orientation Status: Within Functional Limits  Observation/Palpation  Observation: IV, heel touch WB in surg.  shoe RT. LE, bed alarm  Balance  Sitting Balance: Supervision  Standing Balance: Minimal assistance  Functional goals: by discharge, pt will  Short term goal 1: demo SBA with ADL transfers with approp DME and good safety with WB   Short term goal 2: demo SBA with functional mob in room with good safety/pacing for ADL completion  Short term goal 3: demo SBA with toileting routine with good safety  Short term goal 4: demo SBA with UB/LB ADL routine with DME as needed and good safety/pacing  Short term goal 5: demo and verb good understanding of fall prevention techs, EC/WS techs, and possible equip needs for home  Patient Goals   Patient goals : to go home       Therapy Time   Individual Concurrent Group Co-treatment   Time In 1601         Time Out 1632         Minutes 31               Patient would benefit from SNF for continued occupational therapy to increase independence with  ADL of bathing, dressing, toileting and grooming. Writer recommending SNF placement for for activity tolerance and strength which will increase independence with ADL's coordinated with bed mobility and chair transfers. Continued skilled OT services to address decreased safety awareness with ADL and IADL tasks and for education and increased independence with DME and AE for fall prevention and ec/ws techniques prior to d/c home.   Bishop Villafuerte, OT

## 2020-02-17 NOTE — FLOWSHEET NOTE
Patient receives Sacrament of the Sick (anointing) from [facility].    [facility] will follow as needed. (writer charting for [employer].)     96/60/80 9828   Encounter Summary   Services provided to: Patient   Referral/Consult From: [facility] None   Continue Visiting   (2/17/20 anointed)   Complexity of Encounter Low   Length of Encounter 15 minutes   Routine   Type Follow up   Sacraments   Sacrament of Sick-Anointing Anointed  (2/17/20 Fr. Jesus Rhoades)

## 2020-02-17 NOTE — PLAN OF CARE
Problem: Pain:  Goal: Pain level will decrease  Description  Pain level will decrease  Outcome: Ongoing  Goal: Control of acute pain  Description  Control of acute pain  Outcome: Ongoing  Goal: Control of chronic pain  Description  Control of chronic pain  Outcome: Ongoing     Problem: Falls - Risk of:  Goal: Will remain free from falls  Description  Will remain free from falls  Outcome: Ongoing  Goal: Absence of physical injury  Description  Absence of physical injury  Outcome: Ongoing     Problem: Skin Integrity:  Goal: Will show no infection signs and symptoms  Description  Will show no infection signs and symptoms  Outcome: Ongoing  Goal: Absence of new skin breakdown  Description  Absence of new skin breakdown  Outcome: Ongoing

## 2020-02-18 VITALS
RESPIRATION RATE: 20 BRPM | TEMPERATURE: 97.5 F | HEART RATE: 60 BPM | OXYGEN SATURATION: 100 % | DIASTOLIC BLOOD PRESSURE: 83 MMHG | SYSTOLIC BLOOD PRESSURE: 150 MMHG | HEIGHT: 68 IN | BODY MASS INDEX: 27.58 KG/M2 | WEIGHT: 182 LBS

## 2020-02-18 LAB — SURGICAL PATHOLOGY REPORT: NORMAL

## 2020-02-18 PROCEDURE — 6360000002 HC RX W HCPCS: Performed by: STUDENT IN AN ORGANIZED HEALTH CARE EDUCATION/TRAINING PROGRAM

## 2020-02-18 PROCEDURE — 97116 GAIT TRAINING THERAPY: CPT

## 2020-02-18 PROCEDURE — 97530 THERAPEUTIC ACTIVITIES: CPT

## 2020-02-18 PROCEDURE — 6360000002 HC RX W HCPCS: Performed by: NURSE PRACTITIONER

## 2020-02-18 PROCEDURE — 2580000003 HC RX 258: Performed by: INTERNAL MEDICINE

## 2020-02-18 PROCEDURE — 99232 SBSQ HOSP IP/OBS MODERATE 35: CPT | Performed by: INTERNAL MEDICINE

## 2020-02-18 PROCEDURE — 97110 THERAPEUTIC EXERCISES: CPT

## 2020-02-18 PROCEDURE — 99239 HOSP IP/OBS DSCHRG MGMT >30: CPT | Performed by: FAMILY MEDICINE

## 2020-02-18 PROCEDURE — 6370000000 HC RX 637 (ALT 250 FOR IP): Performed by: STUDENT IN AN ORGANIZED HEALTH CARE EDUCATION/TRAINING PROGRAM

## 2020-02-18 PROCEDURE — 6360000002 HC RX W HCPCS: Performed by: INTERNAL MEDICINE

## 2020-02-18 PROCEDURE — 97535 SELF CARE MNGMENT TRAINING: CPT

## 2020-02-18 PROCEDURE — 6370000000 HC RX 637 (ALT 250 FOR IP): Performed by: NURSE PRACTITIONER

## 2020-02-18 PROCEDURE — 2580000003 HC RX 258: Performed by: STUDENT IN AN ORGANIZED HEALTH CARE EDUCATION/TRAINING PROGRAM

## 2020-02-18 RX ORDER — CEFADROXIL 500 MG/1
500 CAPSULE ORAL 2 TIMES DAILY
Qty: 28 CAPSULE | Refills: 0 | DISCHARGE
Start: 2020-02-18 | End: 2020-03-03

## 2020-02-18 RX ORDER — LANOLIN ALCOHOL/MO/W.PET/CERES
3 CREAM (GRAM) TOPICAL NIGHTLY PRN
Qty: 30 TABLET | Refills: 0 | Status: SHIPPED | OUTPATIENT
Start: 2020-02-18 | End: 2020-11-16

## 2020-02-18 RX ADMIN — OXYCODONE HYDROCHLORIDE AND ACETAMINOPHEN 500 MG: 500 TABLET ORAL at 09:42

## 2020-02-18 RX ADMIN — TAMSULOSIN HYDROCHLORIDE 0.4 MG: 0.4 CAPSULE ORAL at 09:42

## 2020-02-18 RX ADMIN — ASPIRIN 81 MG: 81 TABLET, COATED ORAL at 09:42

## 2020-02-18 RX ADMIN — CEFTAROLINE FOSAMIL 400 MG: 600 POWDER, FOR SOLUTION INTRAVENOUS at 01:28

## 2020-02-18 RX ADMIN — CARVEDILOL 6.25 MG: 6.25 TABLET, FILM COATED ORAL at 09:42

## 2020-02-18 RX ADMIN — OXYCODONE AND ACETAMINOPHEN 2 TABLET: 5; 325 TABLET ORAL at 13:52

## 2020-02-18 RX ADMIN — PANTOPRAZOLE SODIUM 40 MG: 40 TABLET, DELAYED RELEASE ORAL at 05:20

## 2020-02-18 RX ADMIN — ENOXAPARIN SODIUM 40 MG: 40 INJECTION SUBCUTANEOUS at 09:41

## 2020-02-18 RX ADMIN — OXYCODONE AND ACETAMINOPHEN 2 TABLET: 5; 325 TABLET ORAL at 05:24

## 2020-02-18 RX ADMIN — LEVOTHYROXINE SODIUM 175 MCG: 0.03 TABLET ORAL at 05:20

## 2020-02-18 RX ADMIN — SODIUM CHLORIDE: 9 INJECTION, SOLUTION INTRAVENOUS at 03:15

## 2020-02-18 RX ADMIN — HYDRALAZINE HYDROCHLORIDE 10 MG: 20 INJECTION INTRAMUSCULAR; INTRAVENOUS at 00:11

## 2020-02-18 RX ADMIN — MELATONIN TAB 3 MG 3 MG: 3 TAB at 00:15

## 2020-02-18 RX ADMIN — HYDROCHLOROTHIAZIDE 25 MG: 25 TABLET ORAL at 09:42

## 2020-02-18 RX ADMIN — OXYCODONE AND ACETAMINOPHEN 2 TABLET: 5; 325 TABLET ORAL at 09:42

## 2020-02-18 RX ADMIN — CHOLECALCIFEROL CAP 125 MCG (5000 UNIT) 5000 UNITS: 125 CAP at 09:41

## 2020-02-18 ASSESSMENT — PAIN - FUNCTIONAL ASSESSMENT
PAIN_FUNCTIONAL_ASSESSMENT: ACTIVITIES ARE NOT PREVENTED
PAIN_FUNCTIONAL_ASSESSMENT: PREVENTS OR INTERFERES SOME ACTIVE ACTIVITIES AND ADLS

## 2020-02-18 ASSESSMENT — PAIN DESCRIPTION - ORIENTATION
ORIENTATION: RIGHT
ORIENTATION: LOWER;MID
ORIENTATION: LOWER

## 2020-02-18 ASSESSMENT — PAIN DESCRIPTION - DESCRIPTORS
DESCRIPTORS: ACHING;DISCOMFORT;THROBBING
DESCRIPTORS: ACHING;DISCOMFORT
DESCRIPTORS: ACHING;SHARP

## 2020-02-18 ASSESSMENT — PAIN DESCRIPTION - PAIN TYPE
TYPE: ACUTE PAIN
TYPE: CHRONIC PAIN
TYPE: CHRONIC PAIN

## 2020-02-18 ASSESSMENT — PAIN SCALES - GENERAL
PAINLEVEL_OUTOF10: 8
PAINLEVEL_OUTOF10: 6
PAINLEVEL_OUTOF10: 7
PAINLEVEL_OUTOF10: 7
PAINLEVEL_OUTOF10: 8
PAINLEVEL_OUTOF10: 8

## 2020-02-18 ASSESSMENT — PAIN DESCRIPTION - ONSET
ONSET: ON-GOING
ONSET: GRADUAL

## 2020-02-18 ASSESSMENT — PAIN DESCRIPTION - LOCATION
LOCATION: BACK
LOCATION: BACK
LOCATION: FOOT

## 2020-02-18 ASSESSMENT — ENCOUNTER SYMPTOMS
RESPIRATORY NEGATIVE: 1
ALLERGIC/IMMUNOLOGIC NEGATIVE: 1
GASTROINTESTINAL NEGATIVE: 1

## 2020-02-18 ASSESSMENT — PAIN DESCRIPTION - PROGRESSION
CLINICAL_PROGRESSION: NOT CHANGED
CLINICAL_PROGRESSION: NOT CHANGED

## 2020-02-18 ASSESSMENT — PAIN DESCRIPTION - FREQUENCY
FREQUENCY: INTERMITTENT
FREQUENCY: CONTINUOUS

## 2020-02-18 NOTE — PROGRESS NOTES
Progress Note  Podiatric Medicine and Surgery     Subjective     CC: Right foot wound     Patient seen and examined at bedside. POD#3 s/p right 4th digit amputation. No acute events overnight. Afebrile, vital signs stable  Pain controlled  Tolerating diet   Culture sensitivities complete     HPI :  Sabrina Lloyd Sr. is a non-diabetic 71 y.o. male seen at bedside for evaluation of right foot wound. Dr. Tricia Potter recommended that the patient present to the ER today for evaluation when seen in his clinic. Patient presents with his wife who assists in providing some of the history. The patient states the wound started about a week ago after he dropped a table on his foot. He continues to have increased swelling and right foot pain. Upon evaluation and xrays in Dr. James Gutierrez office patient was noted to have osteomyelitis of the 4th digit right foot. Patient only reports pain. He is non diabetic. He has a history of previous R 1st MPJ fusion performed approx 1 year ago. Denies other pedal complaints. PCP is Lindsey Humphreys MD    ROS: Denies N/V/F/C/SOB/CP. Otherwise negative except at stated in the HPI.      Medications:  Scheduled Meds:   ceftaroline fosamil (TEFLARO) IVPB  400 mg Intravenous Q12H    aspirin  81 mg Oral Daily    carvedilol  6.25 mg Oral BID WC    hydrochlorothiazide  25 mg Oral Daily    levothyroxine  175 mcg Oral Daily    pantoprazole  40 mg Oral QAM AC    tamsulosin  0.4 mg Oral Daily    vitamin C  500 mg Oral Daily    vitamin D  5,000 Units Oral Daily    sodium chloride flush  10 mL Intravenous 2 times per day    enoxaparin  40 mg Subcutaneous Daily    oxyCODONE-acetaminophen  1 tablet Oral Once       Continuous Infusions:   sodium chloride 75 mL/hr at 02/18/20 0315       PRN Meds:melatonin, sodium chloride flush, potassium chloride **OR** potassium alternative oral replacement **OR** potassium chloride, magnesium sulfate, magnesium hydroxide, nicotine, acetaminophen, ondansetron **OR** ondansetron, oxyCODONE-acetaminophen **OR** oxyCODONE-acetaminophen    Objective     Vitals:  Patient Vitals for the past 8 hrs:   BP Temp Temp src Pulse Resp SpO2 Weight   20 0941 -- -- -- 60 -- -- --   20 0732 (!) 150/83 97.5 °F (36.4 °C) Oral 53 20 100 % --   20 0345 -- -- -- -- -- -- 182 lb (82.6 kg)   20 0304 (!) 146/92 97.7 °F (36.5 °C) Oral 51 18 96 % --     Average, Min, and Max for last 24 hours Vitals:  TEMPERATURE:  Temp  Av.7 °F (36.5 °C)  Min: 97.5 °F (36.4 °C)  Max: 98.1 °F (36.7 °C)    RESPIRATIONS RANGE: Resp  Av.6  Min: 16  Max: 20    PULSE RANGE: Pulse  Av.4  Min: 48  Max: 60    BLOOD PRESSURE RANGE:  Systolic (98KWR), PXV:064 , Min:145 , FLQ:379   ; Diastolic (49JMF), HCR:47, Min:75, Max:101      PULSE OXIMETRY RANGE: SpO2  Av.8 %  Min: 94 %  Max: 100 %    I/O last 3 completed shifts: In: 6747 [P.O.:1840; I.V.:1775]  Out: 3235 [RWWNT:8520]    CBC:  Recent Labs     20  0552   WBC 5.7   HGB 10.6*   HCT 34.3*           BMP:  Recent Labs     20  0552      K 4.2      CO2 27   BUN 17   CREATININE 1.39*   GLUCOSE 120*   CALCIUM 8.2*        Coags:  Recent Labs     20   INR 1.0       Lab Results   Component Value Date    SEDRATE 80 (H) 2020     No results for input(s): CRP in the last 72 hours. Lower Extremity Physical Exam:    Vascular: DP and PT pulses are palpable Left. DP pulse palpable right non palpable let. CFT <4 seconds to all digits. Hair growth is absent to the level of the digits.       Neuro: Saph/sural/SP/DP/plantar sensation intact to light touch.     Musculoskeletal: Muscle strength is 5/5 to all lower extremity muscle groups. Gross deformity is present with contracted digits 1-5 bilat. No tenderness with compression of calf.     Dermatologic: Surgical incision from right 4th digit amputation with skin sutures intact. No purulence or signs of infection. Edema is much improved.  Chronic discoloration of RLE due to edema. Clinical Images:  None    Imaging:   XR FOOT RIGHT (MIN 3 VIEWS)   Final Result   1. Interim amputation of the right 4th toe.   2. Soft tissue swelling in the stump extends into the dorsal right forefoot,   potentially postoperative edema and/or cellulitis. Foci of gas in the   underlying soft tissues are likely postoperative. 3. Uncomplicated arthrodesis of the right 1st metatarsophalangeal joint. 4. Bony demineralization. XR FOOT RIGHT (MIN 3 VIEWS)   Final Result   1. Radiographic findings most consistent with 4th digit acute osteomyelitis   with severe bone loss and surrounding cellulitis. Recommend clinical   correlation. Note: Radiographic evaluation is less sensitive in detection   and description of osteomyelitis comparison with MR modality. 2. 1st metatarsophalangeal joint arthrodesis without evidence of hardware   complication. 3. 3rd digit proximal interphalangeal moderate degenerative joint disease. Cultures:   Intraop tissue cx: GPC    Assessment   Robin Rashid Sr. is a 71 y.o. male with   1. Osteomyelitis proximal phalanx of 4th digit, right foot   2. S/p R 4th toe amputation (DOS: 2/15/2020)  3. Wound right foot  4. Cellulitis, right foot     Principal Problem:    Osteomyelitis (HCC)  Active Problems:    Hepatitis C antibody positive in blood    Hypertension    Hypothyroidism    Bradycardia    CKD (chronic kidney disease), stage III (HCC)    Anemia, normocytic normochromic    Amputation of toe of right foot ([facility] [address]): 2/15/2020    Solitary kidney, congenital  Resolved Problems:    * No resolved hospital problems.  *       Plan     · Patient examined and evaluated at bedside with Dr. Estela Gastelum  · Treatment options discussed in detail with the patient  · Medical management per IM  · ID following   · Dressing changed to Right foot: adaptic, DSD ACE  · Agree patient with benefit from SNF placement  · Minimal WBAT to Right lower extremity in surgical shoe. Keep dressing clean, dry and intact. · Patient ok for discharge from podiatry standpoint once ID has made antibiotic recommendations. · Follow up with Dr. Dominique Espinoza within 1 week from discharge.      Taya Craven DPM   Podiatric Medicine & Surgery   2/18/2020 at 10:16 AM

## 2020-02-18 NOTE — CARE COORDINATION
Call from Faustina at [facility] and pt approved for IV ATB at [facility] and [place] will provide. Will be covered at 100% if IV ATB needed. Awaiting Dr. Julianna Heck recommendation.

## 2020-02-18 NOTE — PLAN OF CARE
Problem: Pain:  Goal: Pain level will decrease  Description  Pain level will decrease  2/18/2020 1108 by Jose Blanco RN  Outcome: Ongoing  Note:   Pain level assessment complete. Patient educated on pain scale and control interventions  PRN pain medication given per patient request  Patient instructed to call out with new onset of pain or unrelieved pain , pain well controlled with PRN pain meds, pt able to tolerate activity at the bedside and working with PT and OT  2/18/2020 0431 by Gabriel Wilson RN  Outcome: Ongoing  Goal: Control of acute pain  Description  Control of acute pain  2/18/2020 0431 by Gabriel Wilson RN  Outcome: Ongoing  Goal: Control of chronic pain  Description  Control of chronic pain  2/18/2020 0431 by Gabriel Wilson RN  Outcome: Ongoing     Problem: Falls - Risk of:  Goal: Will remain free from falls  Description  Will remain free from falls  2/18/2020 1108 by Jose Blanco RN  Outcome: Ongoing  Note:   Patient is a fall risk during this admission. Fall risk assessment was performed. Patient is absent of falls. Bed is in the lowest position. Wheels on the bed are locked. Call light and bed side table are within reach. Clutter is removed. Patient was educated to call out when needing assistance or wanting to get out of bed. Patient offered toileting assistance during rounding. Hourly rounds have been performed.   , fall precautions in place, using bed and chair alarms  2/18/2020 0431 by Gabriel Wilson RN  Outcome: Ongoing  Goal: Absence of physical injury  Description  Absence of physical injury  2/18/2020 0431 by Gabriel Wilson RN  Outcome: Ongoing     Problem: Skin Integrity:  Goal: Will show no infection signs and symptoms  Description  Will show no infection signs and symptoms  2/18/2020 1108 by Jose Blanco RN  Outcome: Ongoing  Note:   Dressing on the right foot dry and intact, moves self in the bed,   2/18/2020 0431 by Gabriel Wilson RN  Outcome: Ongoing  Goal: Absence of new skin breakdown  Description  Absence of new skin breakdown  2/18/2020 0431 by Kiki Levi, RN  Outcome: Ongoing

## 2020-02-18 NOTE — CARE COORDINATION
[facility] approved patient for admission. The wife does not feel comfortable transporting patient and would like an ambulette. Requested a 230 pickup time.  Vincent Farah

## 2020-02-18 NOTE — DISCHARGE SUMMARY
interventions: as above     Significant Diagnostic Studies:     Radiology:  Xr Foot Right (min 3 Views)    Result Date: 2/15/2020  1. Interim amputation of the right 4th toe. 2. Soft tissue swelling in the stump extends into the dorsal right forefoot, potentially postoperative edema and/or cellulitis. Foci of gas in the underlying soft tissues are likely postoperative. 3. Uncomplicated arthrodesis of the right 1st metatarsophalangeal joint. 4. Bony demineralization. Xr Foot Right (min 3 Views)    Result Date: 2/14/2020  1. Radiographic findings most consistent with 4th digit acute osteomyelitis with severe bone loss and surrounding cellulitis. Recommend clinical correlation. Note: Radiographic evaluation is less sensitive in detection and description of osteomyelitis comparison with MR modality. 2. 1st metatarsophalangeal joint arthrodesis without evidence of hardware complication. 3. 3rd digit proximal interphalangeal moderate degenerative joint disease.        Consultations:    Consults:     Final Specialist Recommendations/Findings:   IP CONSULT TO INTERNAL MEDICINE  IP CONSULT TO PODIATRY  IP CONSULT TO PODIATRY  IP CONSULT TO SOCIAL WORK  IP CONSULT TO INFECTIOUS DISEASES      The patient was seen and examined on day of discharge Physical exam  Physical exam  A&O X 3  CTAB  NSR, NO MRG  Soft abdomen , +BS  No swelling  and pulse palpable   R foot wound site in dressing , CDI      Discharge plan:     Disposition: SNF    Physician Follow Up:     Artelia Sever, MD  [place] [address], [facility] [address] [place] [address], [address] [place] [address]  [phone]    Go on 2/25/2020  Appointment 2-25-20 at 1:10pm       Requiring Further Evaluation/Follow Up POST HOSPITALIZATION/Incidental Findings:     Diet: diabetic diet    Activity: As tolerated    Instructions to Patient:     Discharge Medications:      Medication List      START taking these medications HYDROcodone-acetaminophen 5-325 MG per tablet  Commonly known as:  Norco  Take 1 tablet by mouth every 6 hours as needed for Pain for up to 5 days. Intended supply: 7 days. Take lowest dose possible to manage pain     melatonin 3 MG Tabs tablet  Take 1 tablet by mouth nightly as needed (insomnia)        CONTINUE taking these medications    aspirin 81 MG tablet     carvedilol 6.25 MG tablet  Commonly known as:  COREG     ferrous sulfate 325 (65 Fe) MG tablet     hydrochlorothiazide 25 MG tablet  Commonly known as:  HYDRODIURIL     levothyroxine 175 MCG tablet  Commonly known as:  SYNTHROID     lisinopril 5 MG tablet  Commonly known as:  PRINIVIL;ZESTRIL     omeprazole 20 MG EC tablet     tamsulosin 0.4 MG capsule  Commonly known as:  FLOMAX     vitamin C 500 MG tablet  Commonly known as:  ASCORBIC ACID     vitamin D-3 125 MCG (5000 UT) Tabs  Commonly known as:  cholecalciferol           Where to Get Your Medications      These medications were sent to [address] [place] [address]    Hours:  24-hours Phone:  [phone]   · melatonin 3 MG Tabs tablet     You can get these medications from any pharmacy    Bring a paper prescription for each of these medications  · HYDROcodone-acetaminophen 5-325 MG per tablet         No discharge procedures on file. Time Spent on discharge is  35 mins in patient examination, evaluation, counseling as well as medication reconciliation, prescriptions for required medications, discharge plan and follow up. Electronically signed by   Kelsey Bowden MD  2/18/2020  10:14 AM      Thank you Dr. Gerald Edmonds MD for the opportunity to be involved in this patient's care.

## 2020-02-18 NOTE — PROGRESS NOTES
visits:  Short term goal 1: Pt. to be indep with bed mob. Short term goal 2: Pt. to be SBA for sit to stand transfers with RW, heel touch only in Rt. foot  Short term goal 3: Pt. to amb. 50ft. with RW, with wt. through heel only Rt foot/ able to maintain heel touch % of time. Short term goal 4: Follow up with RN/social work re. recommendation of Darco Wedge Off-loading shoe for Rt. foot  Short term goal 5: If pt. were to d/c home, needs to safely negotiate 8 steps, WB through Rt. heel only  Patient Goals   Patient goals : ambulate/ no pain    Plan    Plan  Times per week: 1-2x/day; 5-6days/wk  Current Treatment Recommendations: Strengthening, ROM, Balance Training, Functional Mobility Training, Transfer Training, Gait Training, Endurance Training, Stair training, Patient/Caregiver Education & Training, Safety Education & Training, Home Exercise Program  Safety Devices  Type of devices:  All fall risk precautions in place, Gait belt, Call light within reach, Patient at risk for falls, Nurse notified, Left in bed, Bed alarm in place  Restraints  Initially in place: No     Therapy Time   Individual Concurrent Group Co-treatment   Time In 0911         Time Out 0935         Minutes 1320 Violet Gardner, student physical therapist assistant   Supervised by Mason Celis physical therapist assistant

## 2020-02-18 NOTE — PROGRESS NOTES
Function Panel: No results for input(s): PROT, LABALBU, BILIDIR, IBILI, BILITOT, ALKPHOS, ALT, AST in the last 72 hours. No results for input(s): VANCOTROUGH in the last 72 hours. Lab Results   Component Value Date    CRP 32.8 (H) 02/14/2020     Lab Results   Component Value Date    SEDRATE 80 (H) 02/14/2020       No results for input(s): PROCAL in the last 72 hours. Imaging Studies:   No new imaging    Cultures:     Tissue Culture [[id number]] (Abnormal)  Collected: 02/15/20 1427   Order Status: Completed Specimen: Tissue from Toe Updated: 02/17/20 2057    Specimen Description . TOE    Special Requests CULTURE    Direct Exam MANY NEUTROPHILSAbnormal      FEW GRAM POSITIVE COCCI IN PAIRSAbnormal      RARE GRAM POSITIVE COCCI IN CLUSTERSAbnormal     Culture STAPHYLOCOCCUS AUREUS LIGHT GROWTH This isolate is methicillin susceptible. Abnormal      NO ANAEROBIC ORGANISMS ISOLATED AT 2 DAYSAbnormal    Staphylococcus aureus (1)     Antibiotic Interpretation GAUDENCIO Status    penicillin Resistant  Preliminary     >=0.5  RESISTANT   cefoxitin screen  NOT REPORTED Preliminary    ciprofloxacin   Preliminary     NOT REPORTED   clindamycin Resistant  Preliminary     <=0.25  RESISTANT   erythromycin Resistant RESISTANT Preliminary    gentamicin Sensitive  Preliminary     <=0.5  SUSCEPTIBLE   gentamicin Sensitive Gentamicin is used only in combination with other active agents that test susceptible.  Preliminary    Induced Clind Resist Positive POSITIVE Preliminary    levofloxacin Sensitive  Preliminary     0.25  SUSCEPTIBLE   linezolid   Preliminary     NOT REPORTED   moxifloxacin   Preliminary     NOT REPORTED   nitrofurantoin   Preliminary     NOT REPORTED   oxacillin Sensitive  Preliminary     0.5  SUSCEPTIBLE   Synercid   Preliminary     NOT REPORTED   rifampin   Preliminary     NOT REPORTED   tetracycline Sensitive  Preliminary     <=1  SUSCEPTIBLE   tigecycline   Preliminary     NOT REPORTED   trimethoprim-sulfamethoxazole Sensitive  Preliminary     <=10  SUSCEPTIBLE   vancomycin   Preliminary     NOT REPORTED         Medications:      ceftaroline fosamil (TEFLARO) IVPB  400 mg Intravenous Q12H    aspirin  81 mg Oral Daily    carvedilol  6.25 mg Oral BID WC    hydrochlorothiazide  25 mg Oral Daily    levothyroxine  175 mcg Oral Daily    pantoprazole  40 mg Oral QAM AC    tamsulosin  0.4 mg Oral Daily    vitamin C  500 mg Oral Daily    vitamin D  5,000 Units Oral Daily    sodium chloride flush  10 mL Intravenous 2 times per day    enoxaparin  40 mg Subcutaneous Daily    oxyCODONE-acetaminophen  1 tablet Oral Once           [facility]  Sita Phillips MD  [employer] messaging  OFFICE: [phone]      Electronically signed by Sita Phillips MD on 2/18/2020 at 11:55 AM  Thank you for allowing us to participate in the care of this patient. Please call with questions. This note iscreated with the assistance of a speech recognition program.  While intending to generate a document that actually reflects the content of the visit, the document can still have some errors including those of syntax andsound a like substitutions which may escape proof reading. In such instances, actual meaning can be extrapolated by contextual diversion.

## 2020-02-18 NOTE — PROGRESS NOTES
Occupational Therapy  Facility/Department: [facility] MED SURG  Daily Treatment Note  NAME: Jovanna Hairston.  : 1950  MRN: [id number]    Date of Service: 2020    Discharge Recommendations:  Subacute/Skilled Nursing Facility       Assessment   Performance deficits / Impairments: Decreased functional mobility ; Decreased ADL status; Decreased strength;Decreased safe awareness;Decreased balance;Decreased sensation;Decreased endurance  OT Education: Energy Conservation; Family Education;Precautions; ADL Adaptive Strategies;Transfer Training  Patient Education: safety/fall prevention, use of AD for safe mobility, use of call light/ask for assistance when need to ambulate  REQUIRES OT FOLLOW UP: Yes  Safety Devices  Safety Devices in place: Yes  Type of devices: Call light within reach;Nurse notified; Patient at risk for falls; Left in bed; All fall risk precautions in place         Patient Diagnosis(es): The primary encounter diagnosis was Osteomyelitis of right foot, unspecified type ([facility] [address] A diagnosis of Acute post-operative pain was also pertinent to this visit. has a past medical history of Arthritis, Epilepsy ([facility] [address] Family history of diabetes mellitus in brother, Former cigarette smoker, Full dentures, Hepatitis C antibody positive in blood, Hypertension, Hypothyroidism, Pneumonia, Renal agenesis, Spider bite, Urinary retention, and Wears glasses. has a past surgical history that includes Facial cosmetic surgery; Hand surgery; Colonoscopy; skin split graft (2016); eye surgery (Bilateral); arthrodesis (Right, 2018); pr office/outpt visit,procedure only (Right, 2018); pr office/outpt visit,procedure only (Right, 2018); Toe amputation (Right, 02/15/2020); and Toe amputation (Right, 2/15/2020).     Restrictions  Restrictions/Precautions  Restrictions/Precautions: Weight Bearing, Fall Risk, General Precautions, Surgical Protocols, Up as Tolerated  Lower Extremity Weight Bearing Restrictions  Right Lower Extremity Weight Bearing: (HEEL TOUCH WB with ortho shoe)  Position Activity Restriction  Other position/activity restrictions: Tenisha Curtis at home last week- states first fall   Subjective   General  Chart Reviewed: Yes  Patient assessed for rehabilitation services?: Yes  General Comment  Comments: Writer consulted Lindy Louie RN whom indicated pt. was medically stable for OT this date; pt. discharging this afternoon to [facility] TER[facility]. Pain Assessment  Pain Assessment: 0-10  Pain Level: 8  Pain Type: Chronic pain  Pain Location: Back  Pain Orientation: Lower  Pain Descriptors: Aching;Discomfort  Non-Pharmaceutical Pain Intervention(s): Ambulation/Increased Activity  Pre Treatment Pain Screening  Intervention List: Patient able to continue with treatment  Vital Signs  Patient Currently in Pain: Yes   Orientation  Orientation  Overall Orientation Status: Within Functional Limits  Objective    ADL  Feeding: Independent  Grooming: Stand by assistance  UE Dressing: Setup  LE Dressing: Minimal assistance  Additional Comments: pt is limited by dec. safety awareness, dec. balance, and cues needed throughout all functional mob for walker safety, werner with turns        Balance  Sitting Balance: Supervision  Standing Balance: Contact guard assistance  Standing Balance  Time: 3 min  Activity: func mobility  Comment: pt. impulsive; upon entering the room pt. observed walking around hosp room without assist, & walker across room; writer educated pt. re: safety/fall prevention and need to have staff assist and RW for BUE support when up OOB; reinforced HEEL touch WB precautions  Functional Mobility  Functional - Mobility Device: Rolling Walker  Assist Level: Contact guard assistance  Functional Mobility Comments: pt demo CGA/min A with functional mob in room, pt needing assist to move walker in safe position.        Transfers  Sit to stand: Contact guard assistance  Stand to sit: Contact guard assistance  Transfer

## 2020-02-18 NOTE — CARE COORDINATION
[facility] to transport patient today at 230. HENS completed. Discussed with patient and wife. They are agreeable. Orders faxed. Nurse to call report to [phone].  Celena Barthel

## 2020-03-03 PROBLEM — E03.9 ACQUIRED HYPOTHYROIDISM: Status: ACTIVE | Noted: 2018-01-03

## 2020-03-03 PROBLEM — K21.9 HIATAL HERNIA WITH GASTROESOPHAGEAL REFLUX: Status: ACTIVE | Noted: 2019-09-09

## 2020-03-03 PROBLEM — R79.89 INCREASED LACTIC ACID LEVEL: Status: ACTIVE | Noted: 2020-03-03

## 2020-03-03 PROBLEM — K44.9 HIATAL HERNIA WITH GASTROESOPHAGEAL REFLUX: Status: ACTIVE | Noted: 2019-09-09

## 2020-03-03 PROBLEM — G89.18 ACUTE POSTOPERATIVE PAIN: Status: ACTIVE | Noted: 2020-03-03

## 2020-03-03 PROBLEM — N52.9 ERECTILE DYSFUNCTION: Status: ACTIVE | Noted: 2018-06-04

## 2020-03-03 PROBLEM — Z01.818 PREOPERATIVE CLEARANCE: Status: ACTIVE | Noted: 2018-07-30

## 2020-03-03 PROBLEM — R50.9 FEVER: Status: ACTIVE | Noted: 2018-01-03

## 2020-03-03 PROBLEM — I12.9 BENIGN HYPERTENSION WITH CHRONIC KIDNEY DISEASE, STAGE IV (HCC): Status: ACTIVE | Noted: 2018-10-29

## 2020-03-03 PROBLEM — N18.30 ANEMIA DUE TO STAGE 3 CHRONIC KIDNEY DISEASE (HCC): Status: ACTIVE | Noted: 2018-10-29

## 2020-03-03 PROBLEM — M21.611 BUNION OF RIGHT FOOT: Status: ACTIVE | Noted: 2018-07-30

## 2020-03-03 PROBLEM — D63.1 ANEMIA DUE TO STAGE 3 CHRONIC KIDNEY DISEASE (HCC): Status: ACTIVE | Noted: 2018-10-29

## 2020-03-03 PROBLEM — Z86.010 HISTORY OF COLONIC POLYPS: Status: ACTIVE | Noted: 2020-03-03

## 2020-03-03 PROBLEM — Z89.429 ABSENCE OF TOE (HCC): Status: ACTIVE | Noted: 2020-02-16

## 2020-03-03 PROBLEM — Q60.2 RENAL AGENESIS: Status: ACTIVE | Noted: 2020-02-17

## 2020-03-03 PROBLEM — N18.9 CHRONIC RENAL FAILURE SYNDROME: Status: ACTIVE | Noted: 2020-03-03

## 2020-03-03 PROBLEM — I10 ESSENTIAL HYPERTENSION: Status: ACTIVE | Noted: 2018-01-03

## 2020-03-03 PROBLEM — J10.1 INFLUENZA A: Status: ACTIVE | Noted: 2018-01-03

## 2020-03-03 PROBLEM — N18.4 BENIGN HYPERTENSION WITH CHRONIC KIDNEY DISEASE, STAGE IV (HCC): Status: ACTIVE | Noted: 2018-10-29

## 2020-03-03 PROBLEM — K20.90 ESOPHAGITIS DETERMINED BY ENDOSCOPY: Status: ACTIVE | Noted: 2019-09-09

## 2020-03-03 PROBLEM — N40.0 BENIGN PROSTATIC HYPERPLASIA: Status: ACTIVE | Noted: 2018-10-29

## 2020-03-03 PROBLEM — Z23 NEED FOR INFLUENZA VACCINATION: Status: ACTIVE | Noted: 2018-10-29

## 2020-03-03 PROBLEM — I10 HYPERTENSIVE DISORDER: Status: ACTIVE | Noted: 2020-03-03

## 2020-03-03 PROBLEM — D64.9 NORMOCYTIC NORMOCHROMIC ANEMIA: Status: ACTIVE | Noted: 2020-02-15

## 2020-03-03 PROBLEM — K21.00 GASTRO-ESOPHAGEAL REFLUX DISEASE WITH ESOPHAGITIS: Status: ACTIVE | Noted: 2020-03-03

## 2020-03-04 ENCOUNTER — OFFICE VISIT (OUTPATIENT)
Dept: INFECTIOUS DISEASES | Age: 70
End: 2020-03-04
Payer: COMMERCIAL

## 2020-03-04 VITALS
HEIGHT: 68 IN | SYSTOLIC BLOOD PRESSURE: 120 MMHG | TEMPERATURE: 98 F | BODY MASS INDEX: 27.28 KG/M2 | HEART RATE: 60 BPM | WEIGHT: 180 LBS | DIASTOLIC BLOOD PRESSURE: 74 MMHG

## 2020-03-04 PROCEDURE — 1036F TOBACCO NON-USER: CPT | Performed by: INTERNAL MEDICINE

## 2020-03-04 PROCEDURE — 99214 OFFICE O/P EST MOD 30 MIN: CPT | Performed by: INTERNAL MEDICINE

## 2020-03-04 PROCEDURE — 3017F COLORECTAL CA SCREEN DOC REV: CPT | Performed by: INTERNAL MEDICINE

## 2020-03-04 PROCEDURE — G8427 DOCREV CUR MEDS BY ELIG CLIN: HCPCS | Performed by: INTERNAL MEDICINE

## 2020-03-04 PROCEDURE — 4040F PNEUMOC VAC/ADMIN/RCVD: CPT | Performed by: INTERNAL MEDICINE

## 2020-03-04 PROCEDURE — 1111F DSCHRG MED/CURRENT MED MERGE: CPT | Performed by: INTERNAL MEDICINE

## 2020-03-04 PROCEDURE — 1123F ACP DISCUSS/DSCN MKR DOCD: CPT | Performed by: INTERNAL MEDICINE

## 2020-03-04 PROCEDURE — G8417 CALC BMI ABV UP PARAM F/U: HCPCS | Performed by: INTERNAL MEDICINE

## 2020-03-04 PROCEDURE — G8482 FLU IMMUNIZE ORDER/ADMIN: HCPCS | Performed by: INTERNAL MEDICINE

## 2020-03-04 RX ORDER — HYDROCODONE BITARTRATE AND ACETAMINOPHEN 5; 325 MG/1; MG/1
1 TABLET ORAL EVERY 6 HOURS PRN
Status: ON HOLD | COMMUNITY
End: 2020-08-25

## 2020-03-04 RX ORDER — CEFADROXIL 500 MG/1
500 CAPSULE ORAL 2 TIMES DAILY
COMMUNITY
End: 2020-08-25 | Stop reason: ALTCHOICE

## 2020-03-04 ASSESSMENT — ENCOUNTER SYMPTOMS
RESPIRATORY NEGATIVE: 1
ALLERGIC/IMMUNOLOGIC NEGATIVE: 1
GASTROINTESTINAL NEGATIVE: 1

## 2020-03-04 NOTE — PROGRESS NOTES
Musculoskeletal: Negative. Skin: Negative. Allergic/Immunologic: Negative. Neurological: Negative. Physical Examination :   /74   Pulse 60   Temp 98 °F (36.7 °C)   Ht 5' 7.99\" (1.727 m)   Wt 180 lb (81.6 kg)   BMI 27.38 kg/m²     Physical Exam  Constitutional:       Appearance: He is well-developed. HENT:      Head: Normocephalic and atraumatic. Neck:      Musculoskeletal: Normal range of motion and neck supple. Cardiovascular:      Rate and Rhythm: Normal rate. Heart sounds: Normal heart sounds. No friction rub. No gallop. Pulmonary:      Effort: Pulmonary effort is normal.      Breath sounds: Normal breath sounds. No wheezing. Abdominal:      General: Bowel sounds are normal.      Palpations: Abdomen is soft. There is no mass. Tenderness: There is no abdominal tenderness. Musculoskeletal: Normal range of motion. Lymphadenopathy:      Cervical: No cervical adenopathy. Skin:     General: Skin is warm and dry. Comments: The right foot fourth toe amputation site does have some scabbing skin lesions along the healing incision. This some dermatitis/exfoliating skin noted on the right foot but no signs of soft tissue infection. Neurological:      Mental Status: He is alert and oriented to person, place, and time.          Laboratory studies :  Medical Decision Making:   I have independently reviewed the following labs:  CBC with Differential:  Lab Results   Component Value Date    WBC 5.7 02/16/2020    WBC 7.9 02/14/2020    HGB 10.6 02/16/2020    HGB 10.1 02/14/2020    HCT 34.3 02/16/2020    HCT 32.0 02/14/2020     02/16/2020     02/14/2020    LYMPHOPCT 8 02/14/2020    LYMPHOPCT 31 03/01/2019    MONOPCT 6 02/14/2020    MONOPCT 10 03/01/2019       BMP:  Lab Results   Component Value Date     02/16/2020     02/14/2020    K 4.2 02/16/2020    K 3.5 02/14/2020     02/16/2020    CL 99 02/14/2020    CO2 27 02/16/2020    CO2 24 02/14/2020    BUN 17 02/16/2020    BUN 22 02/14/2020    CREATININE 1.39 02/16/2020    CREATININE 1.77 02/14/2020    MG 2.2 03/01/2019    MG 2.1 02/15/2019       Hepatic Function Panel:   Lab Results   Component Value Date    PROT 7.0 03/01/2019    PROT 7.7 01/19/2019    LABALBU 3.8 01/19/2019    LABALBU 3.8 08/31/2018    LABALBU 3.8 04/06/2012    BILIDIR <0.08 08/31/2018    IBILI CANNOT BE CALCULATED 08/31/2018    BILITOT 0.25 01/19/2019    BILITOT 0.27 08/31/2018    ALKPHOS 71 01/19/2019    ALKPHOS 70 08/31/2018    ALT 9 01/19/2019    ALT 7 08/31/2018    AST 18 01/19/2019    AST 17 08/31/2018       Lab Results   Component Value Date    CRP 32.8 (H) 02/14/2020     Lab Results   Component Value Date    SEDRATE [de-identified] (H) 02/14/2020         Thank you for allowing us to participate in the care of this patient. Pleasecall with questions. Hardeep Salvador MD  Perfect Serve messaging: [phone]    This note is created with the assistance of a speech recognition program.  While intending to generate a document that actually reflects the content ofthe visit, the document can still have some errors including those of syntax and sound a like substitutions which may escape proof reading. It such instances, actual meaning can be extrapolated by contextual diversion.
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
## 2020-04-02 PROBLEM — J10.1 INFLUENZA A: Status: RESOLVED | Noted: 2018-01-03 | Resolved: 2020-04-02

## 2020-04-02 PROBLEM — Z01.818 PREOPERATIVE CLEARANCE: Status: RESOLVED | Noted: 2018-07-30 | Resolved: 2020-04-02

## 2020-04-02 PROBLEM — Z23 NEED FOR INFLUENZA VACCINATION: Status: RESOLVED | Noted: 2018-10-29 | Resolved: 2020-04-02

## 2020-08-25 ENCOUNTER — HOSPITAL ENCOUNTER (INPATIENT)
Age: 70
LOS: 2 days | Discharge: HOME | DRG: 603 | End: 2020-08-27
Attending: EMERGENCY MEDICINE | Admitting: INTERNAL MEDICINE
Payer: COMMERCIAL

## 2020-08-25 ENCOUNTER — APPOINTMENT (OUTPATIENT)
Dept: GENERAL RADIOLOGY | Age: 70
DRG: 603 | End: 2020-08-25
Payer: COMMERCIAL

## 2020-08-25 PROBLEM — L03.90 CELLULITIS: Status: ACTIVE | Noted: 2020-08-25

## 2020-08-25 LAB
ABSOLUTE EOS #: 0.24 K/UL (ref 0–0.44)
ABSOLUTE IMMATURE GRANULOCYTE: 0.02 K/UL (ref 0–0.3)
ABSOLUTE LYMPH #: 0.85 K/UL (ref 1.1–3.7)
ABSOLUTE MONO #: 0.54 K/UL (ref 0.1–1.2)
ANION GAP SERPL CALCULATED.3IONS-SCNC: 10 MMOL/L (ref 9–17)
BASOPHILS # BLD: 1 % (ref 0–2)
BASOPHILS ABSOLUTE: 0.05 K/UL (ref 0–0.2)
BUN BLDV-MCNC: 20 MG/DL (ref 8–23)
BUN/CREAT BLD: 12 (ref 9–20)
C-REACTIVE PROTEIN: 52.3 MG/L (ref 0–5)
CALCIUM SERPL-MCNC: 8.3 MG/DL (ref 8.6–10.4)
CHLORIDE BLD-SCNC: 106 MMOL/L (ref 98–107)
CO2: 23 MMOL/L (ref 20–31)
CREAT SERPL-MCNC: 1.66 MG/DL (ref 0.7–1.2)
DIFFERENTIAL TYPE: ABNORMAL
EOSINOPHILS RELATIVE PERCENT: 4 % (ref 1–4)
GFR AFRICAN AMERICAN: 50 ML/MIN
GFR NON-AFRICAN AMERICAN: 41 ML/MIN
GFR SERPL CREATININE-BSD FRML MDRD: ABNORMAL ML/MIN/{1.73_M2}
GFR SERPL CREATININE-BSD FRML MDRD: ABNORMAL ML/MIN/{1.73_M2}
GLUCOSE BLD-MCNC: 104 MG/DL (ref 70–99)
HCT VFR BLD CALC: 35.8 % (ref 40.7–50.3)
HEMOGLOBIN: 11.2 G/DL (ref 13–17)
IMMATURE GRANULOCYTES: 0 %
LYMPHOCYTES # BLD: 16 % (ref 24–43)
MCH RBC QN AUTO: 27.2 PG (ref 25.2–33.5)
MCHC RBC AUTO-ENTMCNC: 31.3 G/DL (ref 28.4–34.8)
MCV RBC AUTO: 86.9 FL (ref 82.6–102.9)
MONOCYTES # BLD: 10 % (ref 3–12)
NRBC AUTOMATED: 0 PER 100 WBC
PDW BLD-RTO: 13.5 % (ref 11.8–14.4)
PLATELET # BLD: 286 K/UL (ref 138–453)
PLATELET ESTIMATE: ABNORMAL
PMV BLD AUTO: 10.2 FL (ref 8.1–13.5)
POTASSIUM SERPL-SCNC: 3.6 MMOL/L (ref 3.7–5.3)
RBC # BLD: 4.12 M/UL (ref 4.21–5.77)
RBC # BLD: ABNORMAL 10*6/UL
SEDIMENTATION RATE, ERYTHROCYTE: 58 MM (ref 0–20)
SEG NEUTROPHILS: 69 % (ref 36–65)
SEGMENTED NEUTROPHILS ABSOLUTE COUNT: 3.8 K/UL (ref 1.5–8.1)
SODIUM BLD-SCNC: 139 MMOL/L (ref 135–144)
WBC # BLD: 5.5 K/UL (ref 3.5–11.3)
WBC # BLD: ABNORMAL 10*3/UL

## 2020-08-25 PROCEDURE — 85025 COMPLETE CBC W/AUTO DIFF WBC: CPT

## 2020-08-25 PROCEDURE — 2500000003 HC RX 250 WO HCPCS: Performed by: STUDENT IN AN ORGANIZED HEALTH CARE EDUCATION/TRAINING PROGRAM

## 2020-08-25 PROCEDURE — 99284 EMERGENCY DEPT VISIT MOD MDM: CPT

## 2020-08-25 PROCEDURE — 73630 X-RAY EXAM OF FOOT: CPT

## 2020-08-25 PROCEDURE — 80048 BASIC METABOLIC PNL TOTAL CA: CPT

## 2020-08-25 PROCEDURE — 85652 RBC SED RATE AUTOMATED: CPT

## 2020-08-25 PROCEDURE — 73610 X-RAY EXAM OF ANKLE: CPT

## 2020-08-25 PROCEDURE — 99222 1ST HOSP IP/OBS MODERATE 55: CPT | Performed by: INTERNAL MEDICINE

## 2020-08-25 PROCEDURE — 6370000000 HC RX 637 (ALT 250 FOR IP): Performed by: NURSE PRACTITIONER

## 2020-08-25 PROCEDURE — 6370000000 HC RX 637 (ALT 250 FOR IP): Performed by: INTERNAL MEDICINE

## 2020-08-25 PROCEDURE — 86140 C-REACTIVE PROTEIN: CPT

## 2020-08-25 PROCEDURE — 2580000003 HC RX 258: Performed by: NURSE PRACTITIONER

## 2020-08-25 PROCEDURE — APPSS60 APP SPLIT SHARED TIME 46-60 MINUTES: Performed by: NURSE PRACTITIONER

## 2020-08-25 PROCEDURE — 1200000000 HC SEMI PRIVATE

## 2020-08-25 PROCEDURE — 71045 X-RAY EXAM CHEST 1 VIEW: CPT

## 2020-08-25 PROCEDURE — 6360000002 HC RX W HCPCS: Performed by: NURSE PRACTITIONER

## 2020-08-25 RX ORDER — ASCORBIC ACID 500 MG
500 TABLET ORAL DAILY
Status: DISCONTINUED | OUTPATIENT
Start: 2020-08-25 | End: 2020-08-27 | Stop reason: HOSPADM

## 2020-08-25 RX ORDER — HYDROCHLOROTHIAZIDE 25 MG/1
25 TABLET ORAL DAILY
Status: DISCONTINUED | OUTPATIENT
Start: 2020-08-25 | End: 2020-08-27 | Stop reason: HOSPADM

## 2020-08-25 RX ORDER — POTASSIUM CHLORIDE 7.45 MG/ML
10 INJECTION INTRAVENOUS PRN
Status: DISCONTINUED | OUTPATIENT
Start: 2020-08-25 | End: 2020-08-27 | Stop reason: HOSPADM

## 2020-08-25 RX ORDER — ACETAMINOPHEN 325 MG/1
650 TABLET ORAL EVERY 6 HOURS PRN
Status: DISCONTINUED | OUTPATIENT
Start: 2020-08-25 | End: 2020-08-27 | Stop reason: HOSPADM

## 2020-08-25 RX ORDER — LANOLIN ALCOHOL/MO/W.PET/CERES
325 CREAM (GRAM) TOPICAL DAILY
Status: DISCONTINUED | OUTPATIENT
Start: 2020-08-25 | End: 2020-08-27 | Stop reason: HOSPADM

## 2020-08-25 RX ORDER — LISINOPRIL 5 MG/1
5 TABLET ORAL DAILY
Status: DISCONTINUED | OUTPATIENT
Start: 2020-08-26 | End: 2020-08-27 | Stop reason: HOSPADM

## 2020-08-25 RX ORDER — SODIUM CHLORIDE 0.9 % (FLUSH) 0.9 %
10 SYRINGE (ML) INJECTION PRN
Status: DISCONTINUED | OUTPATIENT
Start: 2020-08-25 | End: 2020-08-27 | Stop reason: HOSPADM

## 2020-08-25 RX ORDER — POTASSIUM CHLORIDE 20 MEQ/1
40 TABLET, EXTENDED RELEASE ORAL PRN
Status: DISCONTINUED | OUTPATIENT
Start: 2020-08-25 | End: 2020-08-27 | Stop reason: HOSPADM

## 2020-08-25 RX ORDER — PANTOPRAZOLE SODIUM 40 MG/1
40 TABLET, DELAYED RELEASE ORAL
Status: DISCONTINUED | OUTPATIENT
Start: 2020-08-26 | End: 2020-08-27 | Stop reason: HOSPADM

## 2020-08-25 RX ORDER — POLYETHYLENE GLYCOL 3350 17 G/17G
17 POWDER, FOR SOLUTION ORAL DAILY PRN
Status: DISCONTINUED | OUTPATIENT
Start: 2020-08-25 | End: 2020-08-27 | Stop reason: HOSPADM

## 2020-08-25 RX ORDER — HYDROCODONE BITARTRATE AND ACETAMINOPHEN 5; 325 MG/1; MG/1
1 TABLET ORAL EVERY 4 HOURS PRN
Status: DISCONTINUED | OUTPATIENT
Start: 2020-08-25 | End: 2020-08-27 | Stop reason: HOSPADM

## 2020-08-25 RX ORDER — ASPIRIN 81 MG/1
81 TABLET ORAL DAILY
Status: DISCONTINUED | OUTPATIENT
Start: 2020-08-25 | End: 2020-08-27 | Stop reason: HOSPADM

## 2020-08-25 RX ORDER — ONDANSETRON 2 MG/ML
4 INJECTION INTRAMUSCULAR; INTRAVENOUS EVERY 6 HOURS PRN
Status: DISCONTINUED | OUTPATIENT
Start: 2020-08-25 | End: 2020-08-27 | Stop reason: HOSPADM

## 2020-08-25 RX ORDER — HYDROCODONE BITARTRATE AND ACETAMINOPHEN 5; 325 MG/1; MG/1
1 TABLET ORAL EVERY 4 HOURS PRN
Status: DISCONTINUED | OUTPATIENT
Start: 2020-08-25 | End: 2020-08-25

## 2020-08-25 RX ORDER — HEPARIN SODIUM 5000 [USP'U]/ML
5000 INJECTION, SOLUTION INTRAVENOUS; SUBCUTANEOUS EVERY 8 HOURS SCHEDULED
Status: DISCONTINUED | OUTPATIENT
Start: 2020-08-26 | End: 2020-08-27 | Stop reason: HOSPADM

## 2020-08-25 RX ORDER — MAGNESIUM SULFATE 1 G/100ML
1 INJECTION INTRAVENOUS PRN
Status: DISCONTINUED | OUTPATIENT
Start: 2020-08-25 | End: 2020-08-27 | Stop reason: HOSPADM

## 2020-08-25 RX ORDER — PROMETHAZINE HYDROCHLORIDE 12.5 MG/1
12.5 TABLET ORAL EVERY 6 HOURS PRN
Status: DISCONTINUED | OUTPATIENT
Start: 2020-08-25 | End: 2020-08-27 | Stop reason: HOSPADM

## 2020-08-25 RX ORDER — NICOTINE 21 MG/24HR
1 PATCH, TRANSDERMAL 24 HOURS TRANSDERMAL DAILY PRN
Status: DISCONTINUED | OUTPATIENT
Start: 2020-08-25 | End: 2020-08-27 | Stop reason: HOSPADM

## 2020-08-25 RX ORDER — CLINDAMYCIN PHOSPHATE 600 MG/50ML
600 INJECTION INTRAVENOUS EVERY 8 HOURS
Status: DISCONTINUED | OUTPATIENT
Start: 2020-08-25 | End: 2020-08-26

## 2020-08-25 RX ORDER — CARVEDILOL 6.25 MG/1
6.25 TABLET ORAL 2 TIMES DAILY WITH MEALS
Status: DISCONTINUED | OUTPATIENT
Start: 2020-08-25 | End: 2020-08-27 | Stop reason: HOSPADM

## 2020-08-25 RX ORDER — ACETAMINOPHEN 650 MG/1
650 SUPPOSITORY RECTAL EVERY 6 HOURS PRN
Status: DISCONTINUED | OUTPATIENT
Start: 2020-08-25 | End: 2020-08-27 | Stop reason: HOSPADM

## 2020-08-25 RX ORDER — TAMSULOSIN HYDROCHLORIDE 0.4 MG/1
0.4 CAPSULE ORAL EVERY EVENING
Status: DISCONTINUED | OUTPATIENT
Start: 2020-08-25 | End: 2020-08-27 | Stop reason: HOSPADM

## 2020-08-25 RX ORDER — HYDROCODONE BITARTRATE AND ACETAMINOPHEN 5; 325 MG/1; MG/1
2 TABLET ORAL EVERY 4 HOURS PRN
Status: DISCONTINUED | OUTPATIENT
Start: 2020-08-25 | End: 2020-08-27 | Stop reason: HOSPADM

## 2020-08-25 RX ORDER — LANOLIN ALCOHOL/MO/W.PET/CERES
3 CREAM (GRAM) TOPICAL NIGHTLY PRN
Status: DISCONTINUED | OUTPATIENT
Start: 2020-08-25 | End: 2020-08-27 | Stop reason: HOSPADM

## 2020-08-25 RX ORDER — SODIUM CHLORIDE 9 MG/ML
INJECTION, SOLUTION INTRAVENOUS CONTINUOUS
Status: DISCONTINUED | OUTPATIENT
Start: 2020-08-25 | End: 2020-08-27 | Stop reason: HOSPADM

## 2020-08-25 RX ORDER — SODIUM CHLORIDE 0.9 % (FLUSH) 0.9 %
10 SYRINGE (ML) INJECTION EVERY 12 HOURS SCHEDULED
Status: DISCONTINUED | OUTPATIENT
Start: 2020-08-25 | End: 2020-08-27 | Stop reason: HOSPADM

## 2020-08-25 RX ADMIN — HYDROCODONE BITARTRATE AND ACETAMINOPHEN 1 TABLET: 5; 325 TABLET ORAL at 21:04

## 2020-08-25 RX ADMIN — CLINDAMYCIN IN 5 PERCENT DEXTROSE 600 MG: 12 INJECTION, SOLUTION INTRAVENOUS at 21:04

## 2020-08-25 RX ADMIN — TAMSULOSIN HYDROCHLORIDE 0.4 MG: 0.4 CAPSULE ORAL at 17:52

## 2020-08-25 RX ADMIN — CARVEDILOL 6.25 MG: 6.25 TABLET, FILM COATED ORAL at 17:52

## 2020-08-25 RX ADMIN — SODIUM CHLORIDE: 9 INJECTION, SOLUTION INTRAVENOUS at 13:36

## 2020-08-25 RX ADMIN — HYDROCODONE BITARTRATE AND ACETAMINOPHEN 1 TABLET: 5; 325 TABLET ORAL at 14:55

## 2020-08-25 RX ADMIN — MELATONIN TAB 3 MG 3 MG: 3 TAB at 21:31

## 2020-08-25 RX ADMIN — HYDROCHLOROTHIAZIDE 25 MG: 25 TABLET ORAL at 13:36

## 2020-08-25 RX ADMIN — OXYCODONE HYDROCHLORIDE AND ACETAMINOPHEN 500 MG: 500 TABLET ORAL at 13:36

## 2020-08-25 RX ADMIN — ASPIRIN 81 MG: 81 TABLET, COATED ORAL at 13:36

## 2020-08-25 RX ADMIN — CLINDAMYCIN IN 5 PERCENT DEXTROSE 600 MG: 12 INJECTION, SOLUTION INTRAVENOUS at 12:13

## 2020-08-25 RX ADMIN — FERROUS SULFATE TAB EC 325 MG (65 MG FE EQUIVALENT) 325 MG: 325 (65 FE) TABLET DELAYED RESPONSE at 13:36

## 2020-08-25 RX ADMIN — ENOXAPARIN SODIUM 40 MG: 40 INJECTION SUBCUTANEOUS at 13:36

## 2020-08-25 SDOH — HEALTH STABILITY: MENTAL HEALTH: HOW OFTEN DO YOU HAVE A DRINK CONTAINING ALCOHOL?: 2-3 TIMES A WEEK

## 2020-08-25 ASSESSMENT — PAIN SCALES - GENERAL
PAINLEVEL_OUTOF10: 5
PAINLEVEL_OUTOF10: 6
PAINLEVEL_OUTOF10: 6
PAINLEVEL_OUTOF10: 5
PAINLEVEL_OUTOF10: 5
PAINLEVEL_OUTOF10: 0

## 2020-08-25 ASSESSMENT — PAIN DESCRIPTION - ORIENTATION
ORIENTATION: RIGHT
ORIENTATION: RIGHT

## 2020-08-25 ASSESSMENT — ENCOUNTER SYMPTOMS
GASTROINTESTINAL NEGATIVE: 1
VOMITING: 0
DIARRHEA: 0
BACK PAIN: 0
NAUSEA: 0
EYE DISCHARGE: 0
CONSTIPATION: 0
COLOR CHANGE: 0
RESPIRATORY NEGATIVE: 1
EYES NEGATIVE: 1
COUGH: 1
ALLERGIC/IMMUNOLOGIC NEGATIVE: 1
EYE REDNESS: 0
COLOR CHANGE: 1
ABDOMINAL PAIN: 0
SHORTNESS OF BREATH: 0
FACIAL SWELLING: 0

## 2020-08-25 ASSESSMENT — PAIN - FUNCTIONAL ASSESSMENT: PAIN_FUNCTIONAL_ASSESSMENT: PREVENTS OR INTERFERES SOME ACTIVE ACTIVITIES AND ADLS

## 2020-08-25 ASSESSMENT — PAIN DESCRIPTION - PAIN TYPE
TYPE: ACUTE PAIN;CHRONIC PAIN
TYPE: ACUTE PAIN

## 2020-08-25 ASSESSMENT — PAIN DESCRIPTION - LOCATION
LOCATION: FOOT
LOCATION: FOOT

## 2020-08-25 ASSESSMENT — PAIN DESCRIPTION - DESCRIPTORS
DESCRIPTORS: ACHING
DESCRIPTORS: ACHING

## 2020-08-25 ASSESSMENT — PAIN DESCRIPTION - ONSET
ONSET: ON-GOING
ONSET: ON-GOING

## 2020-08-25 ASSESSMENT — PAIN DESCRIPTION - PROGRESSION: CLINICAL_PROGRESSION: NOT CHANGED

## 2020-08-25 ASSESSMENT — PAIN DESCRIPTION - FREQUENCY
FREQUENCY: CONTINUOUS
FREQUENCY: CONTINUOUS

## 2020-08-25 NOTE — CONSULTS
Consultation Note  Podiatric Medicine and Surgery     Subjective     Chief Complaint: Right ankle pain and swelling    HPI:  Graham Guajardo is a 71 y.o. male seen at [facility] for left ankle pain and swelling. Patient is a poor historian, most information is from chart review. The patient states his symptoms started approximately 2 days ago with redness and swelling and his ankle is painful while ambulating. He admits to a slight cough, but no phlegm or fever. He is not diabetic and denies any numbness or tingling in his lower extremities. He has been seen by Dr. Makayla Alejandre in the past for a right 1st MTPJ arthrodesis and recently had his 4th digit amputated after dropping a table on the toe and developing OM. PCP is Chelle May MD    ROS:   Review of Systems   Constitutional: Negative for chills and fever. Respiratory: Positive for cough. Negative for shortness of breath. Cardiovascular: Negative for chest pain. Gastrointestinal: Negative for diarrhea, nausea and vomiting. Musculoskeletal: Positive for arthralgias and joint swelling. Skin: Positive for color change. Negative for wound. Neurological: Negative for dizziness and numbness. Psychiatric/Behavioral: Negative for agitation. The patient is not nervous/anxious.         Past Medical History   has a past medical history of Absence of toe ([address] Acquired hypothyroidism, Acute kidney injury ([address] CARLTON (acute kidney injury) ([address] Anemia due to stage 3 chronic kidney disease ([address] Arthritis, Benign hypertension with chronic kidney disease, stage IV ([address] Benign prostatic hyperplasia, Benign prostatic hyperplasia without lower urinary tract symptoms, Bradycardia, Bunion of right foot, Cellulitis of left upper extremity, Cellulitis of left upper limb, Chronic renal failure syndrome, Elevated lactic acid level, Epilepsy ([address] Erectile dysfunction, Esophagitis determined by endoscopy, Essential hypertension, Family history of diabetes DAY 6/27/19  Yes Historical Provider, MD   vitamin C (ASCORBIC ACID) 500 MG tablet Take 500 mg by mouth daily   Yes Historical Provider, MD   aspirin 81 MG tablet Take 81 mg by mouth daily   Yes Historical Provider, MD   hydrochlorothiazide (HYDRODIURIL) 25 MG tablet Take 25 mg by mouth daily  3/13/16  Yes Historical Provider, MD   lisinopril (PRINIVIL;ZESTRIL) 5 MG tablet Take 5 mg by mouth daily   Yes Historical Provider, MD   tamsulosin (FLOMAX) 0.4 MG capsule Take 0.4 mg by mouth daily   Yes Historical Provider, MD    Scheduled Meds:   clindamycin (CLEOCIN) IV  600 mg Intravenous Q8H    aspirin  81 mg Oral Daily    carvedilol  6.25 mg Oral BID WC    ferrous sulfate  325 mg Oral Daily    hydroCHLOROthiazide  25 mg Oral Daily    [START ON 8/26/2020] levothyroxine  175 mcg Oral Daily    [START ON 8/26/2020] lisinopril  5 mg Oral Daily    [START ON 8/26/2020] pantoprazole  40 mg Oral QAM AC    tamsulosin  0.4 mg Oral QPM    vitamin C  500 mg Oral Daily    [START ON 8/26/2020] vitamin D3  1 tablet Oral Daily    sodium chloride flush  10 mL Intravenous 2 times per day    enoxaparin  40 mg Subcutaneous Daily     Continuous Infusions:   sodium chloride 75 mL/hr at 08/25/20 1336     PRN Meds:.melatonin, sodium chloride flush, potassium chloride **OR** potassium alternative oral replacement **OR** potassium chloride, magnesium sulfate, acetaminophen **OR** acetaminophen, polyethylene glycol, promethazine **OR** ondansetron, nicotine    Allergies  has No Known Allergies. Family History  family history includes Arthritis in his mother; Diabetes in his brother; Heart Disease in his sister; High Blood Pressure in his mother; Stomach Cancer in his sister. Social History   reports that he quit smoking about 29 years ago. His smoking use included cigarettes. He started smoking about 55 years ago. He has a 10.20 pack-year smoking history. He has never used smokeless tobacco.   reports current alcohol use.

## 2020-08-25 NOTE — PROGRESS NOTES
Transitions of Care Pharmacy Service   Medication Review    The patient's list of current home medications has been reviewed. Source(s) of information: patient, PCP med list, Surescripts, OARRS      Please feel free to call me with any questions about this encounter. Thank you.     Nicholas Benson [facility]   Transitions of Care Pharmacy Service  Phone:  [phone]  Fax: [phone]      Electronically signed by Nicholas Benson [facility] on 8/25/2020 at 5:32 PM         Medications Prior to Admission:   Multiple Vitamins-Minerals (MULTIVITAMIN ADULT PO), Take 1 tablet by mouth daily   VITAMIN E PO, Take 1 capsule by mouth daily  melatonin 3 MG TABS tablet, Take 1 tablet by mouth nightly as needed (insomnia)  omeprazole 20 MG EC tablet, Take 20 mg by mouth daily  levothyroxine (SYNTHROID) 175 MCG tablet, Take 175 mcg by mouth Daily  carvedilol (COREG) 6.25 MG tablet, Take 6.25 mg by mouth 2 times daily (with meals)  ferrous sulfate 325 (65 Fe) MG tablet, Take 325 mg by mouth daily  vitamin D-3 (CHOLECALCIFEROL) 125 MCG (5000 UT) TABS, Take 5,000 Units by mouth daily   vitamin C (ASCORBIC ACID) 500 MG tablet, Take 500 mg by mouth daily  aspirin 81 MG tablet, Take 81 mg by mouth daily  hydrochlorothiazide (HYDRODIURIL) 25 MG tablet, Take 25 mg by mouth daily   lisinopril (PRINIVIL;ZESTRIL) 5 MG tablet, Take 5 mg by mouth daily  tamsulosin (FLOMAX) 0.4 MG capsule, Take 0.4 mg by mouth nightly

## 2020-08-25 NOTE — PLAN OF CARE
Problem: Pain:  Goal: Pain level will decrease  Description: Pain level will decrease  Outcome: Ongoing  Goal: Control of acute pain  Description: Control of acute pain  Outcome: Ongoing     Problem: Skin Integrity:  Goal: Will show no infection signs and symptoms  Description: Will show no infection signs and symptoms  Outcome: Ongoing  Goal: Absence of new skin breakdown  Description: Absence of new skin breakdown  Outcome: Ongoing   IV atb, prn pain med Id / Pod consult
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
## 2020-08-26 LAB
ANION GAP SERPL CALCULATED.3IONS-SCNC: 9 MMOL/L (ref 9–17)
BUN BLDV-MCNC: 17 MG/DL (ref 8–23)
BUN/CREAT BLD: 11 (ref 9–20)
CALCIUM SERPL-MCNC: 7.8 MG/DL (ref 8.6–10.4)
CHLORIDE BLD-SCNC: 106 MMOL/L (ref 98–107)
CO2: 24 MMOL/L (ref 20–31)
CREAT SERPL-MCNC: 1.56 MG/DL (ref 0.7–1.2)
GFR AFRICAN AMERICAN: 54 ML/MIN
GFR NON-AFRICAN AMERICAN: 44 ML/MIN
GFR SERPL CREATININE-BSD FRML MDRD: ABNORMAL ML/MIN/{1.73_M2}
GFR SERPL CREATININE-BSD FRML MDRD: ABNORMAL ML/MIN/{1.73_M2}
GLUCOSE BLD-MCNC: 102 MG/DL (ref 70–99)
HCT VFR BLD CALC: 33 % (ref 40.7–50.3)
HEMOGLOBIN: 10.3 G/DL (ref 13–17)
MCH RBC QN AUTO: 27.4 PG (ref 25.2–33.5)
MCHC RBC AUTO-ENTMCNC: 31.2 G/DL (ref 28.4–34.8)
MCV RBC AUTO: 87.8 FL (ref 82.6–102.9)
NRBC AUTOMATED: 0 PER 100 WBC
PDW BLD-RTO: 13.7 % (ref 11.8–14.4)
PLATELET # BLD: 256 K/UL (ref 138–453)
PMV BLD AUTO: 10.3 FL (ref 8.1–13.5)
POTASSIUM SERPL-SCNC: 3.9 MMOL/L (ref 3.7–5.3)
RBC # BLD: 3.76 M/UL (ref 4.21–5.77)
SODIUM BLD-SCNC: 139 MMOL/L (ref 135–144)
URIC ACID: 5.7 MG/DL (ref 3.4–7)
WBC # BLD: 4.1 K/UL (ref 3.5–11.3)

## 2020-08-26 PROCEDURE — 80048 BASIC METABOLIC PNL TOTAL CA: CPT

## 2020-08-26 PROCEDURE — 99253 IP/OBS CNSLTJ NEW/EST LOW 45: CPT | Performed by: INTERNAL MEDICINE

## 2020-08-26 PROCEDURE — 84550 ASSAY OF BLOOD/URIC ACID: CPT

## 2020-08-26 PROCEDURE — 6370000000 HC RX 637 (ALT 250 FOR IP): Performed by: INTERNAL MEDICINE

## 2020-08-26 PROCEDURE — 2500000003 HC RX 250 WO HCPCS: Performed by: STUDENT IN AN ORGANIZED HEALTH CARE EDUCATION/TRAINING PROGRAM

## 2020-08-26 PROCEDURE — 2580000003 HC RX 258: Performed by: INTERNAL MEDICINE

## 2020-08-26 PROCEDURE — 1200000000 HC SEMI PRIVATE

## 2020-08-26 PROCEDURE — 99232 SBSQ HOSP IP/OBS MODERATE 35: CPT | Performed by: INTERNAL MEDICINE

## 2020-08-26 PROCEDURE — 2580000003 HC RX 258: Performed by: NURSE PRACTITIONER

## 2020-08-26 PROCEDURE — 6360000002 HC RX W HCPCS: Performed by: INTERNAL MEDICINE

## 2020-08-26 PROCEDURE — 6360000002 HC RX W HCPCS: Performed by: NURSE PRACTITIONER

## 2020-08-26 PROCEDURE — 6370000000 HC RX 637 (ALT 250 FOR IP): Performed by: NURSE PRACTITIONER

## 2020-08-26 PROCEDURE — 85027 COMPLETE CBC AUTOMATED: CPT

## 2020-08-26 PROCEDURE — APPSS45 APP SPLIT SHARED TIME 31-45 MINUTES: Performed by: NURSE PRACTITIONER

## 2020-08-26 PROCEDURE — 36415 COLL VENOUS BLD VENIPUNCTURE: CPT

## 2020-08-26 RX ADMIN — ASPIRIN 81 MG: 81 TABLET, COATED ORAL at 09:05

## 2020-08-26 RX ADMIN — HYDROCODONE BITARTRATE AND ACETAMINOPHEN 2 TABLET: 5; 325 TABLET ORAL at 21:59

## 2020-08-26 RX ADMIN — HYDROCHLOROTHIAZIDE 25 MG: 25 TABLET ORAL at 09:05

## 2020-08-26 RX ADMIN — HYDROCODONE BITARTRATE AND ACETAMINOPHEN 2 TABLET: 5; 325 TABLET ORAL at 03:33

## 2020-08-26 RX ADMIN — FERROUS SULFATE TAB EC 325 MG (65 MG FE EQUIVALENT) 325 MG: 325 (65 FE) TABLET DELAYED RESPONSE at 09:05

## 2020-08-26 RX ADMIN — LEVOTHYROXINE SODIUM 175 MCG: 0.15 TABLET ORAL at 05:46

## 2020-08-26 RX ADMIN — TAMSULOSIN HYDROCHLORIDE 0.4 MG: 0.4 CAPSULE ORAL at 17:07

## 2020-08-26 RX ADMIN — Medication 10 ML: at 09:06

## 2020-08-26 RX ADMIN — PANTOPRAZOLE SODIUM 40 MG: 40 TABLET, DELAYED RELEASE ORAL at 05:46

## 2020-08-26 RX ADMIN — CHOLECALCIFEROL TAB 125 MCG (5000 UNIT) 5000 UNITS: 125 TAB at 09:05

## 2020-08-26 RX ADMIN — OXYCODONE HYDROCHLORIDE AND ACETAMINOPHEN 500 MG: 500 TABLET ORAL at 09:05

## 2020-08-26 RX ADMIN — CARVEDILOL 6.25 MG: 6.25 TABLET, FILM COATED ORAL at 09:05

## 2020-08-26 RX ADMIN — CLINDAMYCIN IN 5 PERCENT DEXTROSE 600 MG: 12 INJECTION, SOLUTION INTRAVENOUS at 12:27

## 2020-08-26 RX ADMIN — SODIUM CHLORIDE: 9 INJECTION, SOLUTION INTRAVENOUS at 14:35

## 2020-08-26 RX ADMIN — LISINOPRIL 5 MG: 5 TABLET ORAL at 09:05

## 2020-08-26 RX ADMIN — HEPARIN SODIUM 5000 UNITS: 5000 INJECTION INTRAVENOUS; SUBCUTANEOUS at 05:46

## 2020-08-26 RX ADMIN — CARVEDILOL 6.25 MG: 6.25 TABLET, FILM COATED ORAL at 17:07

## 2020-08-26 RX ADMIN — CEFAZOLIN SODIUM 1 G: 1 INJECTION, POWDER, FOR SOLUTION INTRAMUSCULAR; INTRAVENOUS at 18:52

## 2020-08-26 RX ADMIN — HEPARIN SODIUM 5000 UNITS: 5000 INJECTION INTRAVENOUS; SUBCUTANEOUS at 14:19

## 2020-08-26 RX ADMIN — HEPARIN SODIUM 5000 UNITS: 5000 INJECTION INTRAVENOUS; SUBCUTANEOUS at 21:48

## 2020-08-26 RX ADMIN — CLINDAMYCIN IN 5 PERCENT DEXTROSE 600 MG: 12 INJECTION, SOLUTION INTRAVENOUS at 03:33

## 2020-08-26 ASSESSMENT — PAIN DESCRIPTION - PAIN TYPE
TYPE: ACUTE PAIN;CHRONIC PAIN
TYPE: ACUTE PAIN
TYPE: ACUTE PAIN

## 2020-08-26 ASSESSMENT — PAIN DESCRIPTION - DESCRIPTORS
DESCRIPTORS: ACHING

## 2020-08-26 ASSESSMENT — PAIN SCALES - GENERAL
PAINLEVEL_OUTOF10: 7
PAINLEVEL_OUTOF10: 2
PAINLEVEL_OUTOF10: 7
PAINLEVEL_OUTOF10: 7

## 2020-08-26 ASSESSMENT — PAIN DESCRIPTION - PROGRESSION
CLINICAL_PROGRESSION: NOT CHANGED

## 2020-08-26 ASSESSMENT — PAIN DESCRIPTION - LOCATION
LOCATION: FOOT

## 2020-08-26 ASSESSMENT — PAIN DESCRIPTION - ONSET
ONSET: ON-GOING

## 2020-08-26 ASSESSMENT — PAIN DESCRIPTION - FREQUENCY
FREQUENCY: CONTINUOUS

## 2020-08-26 ASSESSMENT — ENCOUNTER SYMPTOMS
GASTROINTESTINAL NEGATIVE: 1
ALLERGIC/IMMUNOLOGIC NEGATIVE: 1
EYES NEGATIVE: 1
BACK PAIN: 0
RESPIRATORY NEGATIVE: 1

## 2020-08-26 ASSESSMENT — PAIN - FUNCTIONAL ASSESSMENT
PAIN_FUNCTIONAL_ASSESSMENT: PREVENTS OR INTERFERES SOME ACTIVE ACTIVITIES AND ADLS

## 2020-08-26 ASSESSMENT — PAIN DESCRIPTION - ORIENTATION
ORIENTATION: RIGHT

## 2020-08-26 NOTE — PLAN OF CARE
Problem: Pain:  Goal: Pain level will decrease  Description: Pain level will decrease  8/25/2020 2326 by Yehuda Ledesma RN  Outcome: Ongoing     Problem: Pain:  Goal: Control of acute pain  Description: Control of acute pain  8/25/2020 2326 by Yehuda Ledesma RN  Outcome: Ongoing     Problem: Pain:  Goal: Control of chronic pain  Description: Control of chronic pain  Outcome: Ongoing     Problem: Skin Integrity:  Goal: Will show no infection signs and symptoms  Description: Will show no infection signs and symptoms  8/25/2020 2326 by Yehuda Ledesma RN  Outcome: Ongoing     Problem: Skin Integrity:  Goal: Absence of new skin breakdown  Description: Absence of new skin breakdown  8/25/2020 2326 by Yehuda Ledesma RN  Outcome: Ongoing
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
## 2020-08-26 NOTE — PROGRESS NOTES
@[facility]@    [address] [place] [address]    Progress Note    8/26/2020    8:44 AM    Name:   Kacy Escalante. MRN:     [id number]     Acct:      [de-identified]   Room:   210/2108-[facility] Day:  1  Admit Date:  8/25/2020  8:40 AM    PCP:   Chantelle Crocker MD  Code Status:  Full Code    Subjective:     C/C:   Chief Complaint   Patient presents with    Foot Swelling    Wheezing    Cough     Interval History Status: improved. Patient remains afebrile, vital signs stable. Pain is controlled/improved from the day prior. Erythema and edema improved in RLE. Improvement noted on IV antibiotics. Brief History:     Kacy Vizcarra is a 71 y.o. / male who presents with Foot Swelling; Wheezing; and Cough   and is admitted to the hospital for the management of Cellulitis.     Patient presented to the emergency department with a chief complaint of swelling, pain, and redness of the right ankle and foot that started yesterday. He denies any injury or history of injury. He does have a history of a toe amputation of the right foot in February of this year. Other history includes hypertension, hypothyroidism, BPH, CKD, GERD, and positive hepatitis C antibody. Podiatry was consulted by the emergency department and evaluated the patient. He is being admitted for further evaluation and management of cellulitis of the right foot. Review of Systems:     Review of Systems   Constitutional: Negative. HENT: Negative. Eyes: Negative. Respiratory: Negative. Cardiovascular: Negative. Gastrointestinal: Negative. Endocrine: Negative. Genitourinary: Negative. Musculoskeletal: Positive for myalgias. Negative for arthralgias, back pain, gait problem, joint swelling, neck pain and neck stiffness. Skin: Negative. Allergic/Immunologic: Negative. Neurological: Negative. Hematological: Negative. Psychiatric/Behavioral: Negative. Medications:      Allergies:  No Known Allergies    Current Meds:   Scheduled Meds:    clindamycin (CLEOCIN) IV  600 mg Intravenous Q8H    aspirin  81 mg Oral Daily    carvedilol  6.25 mg Oral BID WC    ferrous sulfate  325 mg Oral Daily    hydroCHLOROthiazide  25 mg Oral Daily    levothyroxine  175 mcg Oral Daily    lisinopril  5 mg Oral Daily    pantoprazole  40 mg Oral QAM AC    tamsulosin  0.4 mg Oral QPM    vitamin C  500 mg Oral Daily    vitamin D3  1 tablet Oral Daily    sodium chloride flush  10 mL Intravenous 2 times per day    heparin (porcine)  5,000 Units Subcutaneous 3 times per day     Continuous Infusions:    sodium chloride 75 mL/hr at 08/25/20 1336     PRN Meds: melatonin, sodium chloride flush, potassium chloride **OR** potassium alternative oral replacement **OR** potassium chloride, magnesium sulfate, acetaminophen **OR** acetaminophen, polyethylene glycol, promethazine **OR** ondansetron, nicotine, HYDROcodone 5 mg - acetaminophen **OR** HYDROcodone 5 mg - acetaminophen    Data:     Past Medical History:   has a past medical history of Absence of toe ([facility] Acquired hypothyroidism, Acute kidney injury ([facility] [place] (acute kidney injury) ([facility] Anemia due to stage 3 chronic kidney disease ([facility] Arthritis, Benign hypertension with chronic kidney disease, stage IV ([facility] Benign prostatic hyperplasia, Benign prostatic hyperplasia without lower urinary tract symptoms, Bradycardia, Bunion of right foot, Cellulitis of left upper extremity, Cellulitis of left upper limb, Chronic renal failure syndrome, Elevated lactic acid level, Epilepsy ([facility] Erectile dysfunction, Esophagitis determined by endoscopy, Essential hypertension, Family history of diabetes mellitus in brother, Fever, Former cigarette smoker, Full dentures, Gastro-esophageal reflux disease with esophagitis, Hepatitis C antibody positive in blood, Hiatal hernia, Hiatal hernia with gastroesophageal reflux, High anion gap metabolic acidosis, History of colonic polyps, Hypertension, Hypertensive disorder, Hypothyroidism, Increased lactic acid level, Influenza A, Need for influenza vaccination, Normocytic normochromic anemia, Osteomyelitis (HCC), P-ANCA and MPO antibodies positive, Pneumonia, Renal agenesis, Sepsis ([address] Severe sepsis ([address] Severe sepsis with acute organ dysfunction ([address] Skin necrosis ([facility] Solitary kidney, congenital, Spider bite, Urinary retention, Vitamin D deficiency, and Wears glasses. Social History:   reports that he quit smoking about 29 years ago. His smoking use included cigarettes. He started smoking about 55 years ago. He has a 10.20 pack-year smoking history. He has never used smokeless tobacco. He reports current alcohol use. He reports previous drug use. Drug: Marijuana. Family History:   Family History   Problem Relation Age of Onset    High Blood Pressure Mother     Arthritis Mother     Heart Disease Sister     Stomach Cancer Sister     Diabetes Brother        Vitals:  BP 96/66   Pulse 57   Temp 98.3 °F (36.8 °C) (Oral)   Resp 18   Ht 5' 8\" (1.727 m)   Wt 184 lb 6.4 oz (83.6 kg)   SpO2 96%   BMI 28.04 kg/m²   Temp (24hrs), Av.3 °F (36.8 °C), Min:97.8 °F (36.6 °C), Max:98.6 °F (37 °C)    No results for input(s): POCGLU in the last 72 hours. I/O (24Hr):     Intake/Output Summary (Last 24 hours) at 2020 0844  Last data filed at 2020 4686  Gross per 24 hour   Intake 1898 ml   Output 300 ml   Net 1598 ml       Labs:  Hematology:  Recent Labs     20  0900 20  0543   WBC 5.5 4.1   RBC 4.12* 3.76*   HGB 11.2* 10.3*   HCT 35.8* 33.0*   MCV 86.9 87.8   MCH 27.2 27.4   MCHC 31.3 31.2   RDW 13.5 13.7    256   MPV 10.2 10.3   SEDRATE 58*  --    CRP 52.3*  --      Chemistry:  Recent Labs     20  0900 20  0543    139   K 3.6* 3.9    106   CO2 23 24   GLUCOSE 104* 102*   BUN 20 17   CREATININE 1.66* 1.56*   ANIONGAP 10 9   LABGLOM 41* 44*   GFRAA 50* 54*   CALCIUM 8.3* 7.8*   No results for input(s): PROT, LABALBU, LABA1C, J9MWNYP, Z2KLLNN, FT4, TSH, AST, ALT, LDH, GGT, ALKPHOS, LABGGT, BILITOT, BILIDIR, AMMONIA, AMYLASE, LIPASE, LACTATE, CHOL, HDL, LDLCHOLESTEROL, CHOLHDLRATIO, TRIG, VLDL, RDN65FL, PHENYTOIN, PHENYF, URICACID, POCGLU in the last 72 hours. ABG:  Lab Results   Component Value Date    PH 7.54 11/01/2015    PCO2 21 11/01/2015    PO2 27 11/01/2015    HCO3 18.1 11/01/2015    O2SAT 62 11/01/2015    FIO2 NOT REPORTED 11/01/2015     Lab Results   Component Value Date/Time    SPECIAL CULTURE 02/15/2020 02:27 PM     Lab Results   Component Value Date/Time    CULTURE (A) 02/15/2020 02:27 PM     STAPHYLOCOCCUS AUREUS LIGHT GROWTH This isolate is methicillin susceptible. CULTURE NO ANAEROBIC ORGANISMS ISOLATED AT 5 DAYS (A) 02/15/2020 02:27 PM       Radiology:  [facility] Ankle Right (min 3 Views)    Result Date: 8/25/2020  No acute bony or joint space abnormality     Xr Foot Right (min 3 Views)    Result Date: 8/25/2020  No acute osseous abnormality of the right foot. No evidence of osteomyelitis. Xr Chest Portable    Result Date: 8/25/2020  1. No active pulmonary disease. Physical Examination:        Physical Exam  Vitals signs and nursing note reviewed. Constitutional:       General: He is not in acute distress. Appearance: Normal appearance. He is normal weight. He is not ill-appearing, toxic-appearing or diaphoretic. HENT:      Head: Normocephalic and atraumatic. Right Ear: External ear normal.      Left Ear: External ear normal.      Nose: Nose normal. No congestion or rhinorrhea. Mouth/Throat:      Mouth: Mucous membranes are moist.   Eyes:      General: No scleral icterus. Right eye: No discharge. Left eye: No discharge. Extraocular Movements: Extraocular movements intact. Conjunctiva/sclera: Conjunctivae normal.      Pupils: Pupils are equal, round, and reactive to light.    Neck: Musculoskeletal: Normal range of motion and neck supple. No neck rigidity or muscular tenderness. Vascular: No carotid bruit. Cardiovascular:      Rate and Rhythm: Normal rate and regular rhythm. Pulses: Normal pulses. Heart sounds: Normal heart sounds. No murmur. No friction rub. No gallop. Pulmonary:      Effort: Pulmonary effort is normal. No respiratory distress. Breath sounds: Normal breath sounds. No stridor. No wheezing, rhonchi or rales. Chest:      Chest wall: No tenderness. Abdominal:      General: Bowel sounds are normal. There is no distension. Palpations: Abdomen is soft. There is no mass. Tenderness: There is no abdominal tenderness. There is no guarding or rebound. Hernia: No hernia is present. Musculoskeletal:         General: Tenderness (RLE) present. No swelling, deformity or signs of injury. Right lower leg: Edema present. Left lower leg: No edema. Lymphadenopathy:      Cervical: No cervical adenopathy. Skin:     General: Skin is warm and dry. Capillary Refill: Capillary refill takes less than 2 seconds. Coloration: Skin is not jaundiced or pale. Findings: No bruising, erythema, lesion or rash. Neurological:      General: No focal deficit present. Mental Status: He is alert and oriented to person, place, and time. Sensory: No sensory deficit. Motor: No weakness.       Coordination: Coordination normal.   Psychiatric:         Mood and Affect: Mood normal.         Behavior: Behavior normal.         Assessment:        Hospital Problems           Last Modified POA    * (Principal) Cellulitis 8/25/2020 Yes    Hepatitis C antibody positive in blood 8/25/2020 Yes    Hypothyroidism 8/25/2020 Yes    Benign prostatic hyperplasia without lower urinary tract symptoms 8/25/2020 Yes    CKD (chronic kidney disease), stage III ([address] 8/25/2020 Yes    Amputation of toe of right foot ([address]): 2/15/2020 8/25/2020 Yes    Solitary kidney, congenital 8/25/2020 Yes    Absence of toe ([facility] 8/25/2020 Yes    Anemia due to stage 3 chronic kidney disease ([facility] 8/25/2020 Yes    Essential hypertension 8/25/2020 Yes    Gastro-esophageal reflux disease with esophagitis 8/25/2020 Yes    Cellulitis of right leg 8/25/2020 Yes          Plan:        1. Hypertension: Continue home Coreg, lisinopril, and hydrochlorothiazide  2. GERD: Continue Protonix  3. BPH: Continue Flomax  4. Hypothyroidism: Continue home Synthroid  5. Antimicrobial of clindamycin for cellulitis  6. DVT prophylaxis: Subcutaneous heparin  7. IV fluid 75 mL/h  8. Avoid nephrotoxic medications due to CKD  9. Pain management: Norco  10. Monitor labs of BMP and CBC  11. Replete electrolytes as needed  12. Consult podiatry  13.  Consult infectious disease    MALU Cowart - CNP  8/26/2020  8:44 AM

## 2020-08-26 NOTE — CARE COORDINATION
Case Management Initial Discharge [address]         Readmission Risk              Risk of Unplanned Readmission:        18             Met with:patient to discuss discharge plans. Information verified: address, contacts, phone number, , insurance Yes  PCP: Mary Baum MD  Date of last visit: Aug 2020    Insurance Provider: [facility] [place]/ Medicare    Discharge Planning  Current Residence:  Private home  Living Arrangements:  Spouse/Significant Other   Home has 2 stories/3 outside steps. Bedroom and full bathroom on second floor and full bathroom on first floor. Pt states no problem with steps   Support Systems:  Spouse/Significant Other, Children  Current Services PTA:  none Agency: none  Patient able to perform ADL's:Independent except pt does not drive  DME in home:  [employer], wheelchair, crutches, bath bench, elevated toilet seat  DME used to aid ambulation prior to admission:   crutches  DME used during admission:  TBD    Potential Assistance Needed:  N/A    [facility]   [address]   Potential Assistance Purchasing Medications:  No  Does patient want to participate in local refill/ meds to beds program?  Yes    Patient agreeable to home care: No  [place] of choice provided:  n/a      Type of Home Care Services:  None  Patient expects to be discharged to:  home    Prior SNF/Rehab Placement and Facility: [facility]  Agreeable to SNF/Rehab: No  [place] of choice provided: n/a   Evaluation: n/a    Expected Discharge date:  20  Follow Up Appointment: Best Day/ Time: Monday AM    Transportation provider: wife  Transportation arrangements needed for discharge: No    Discharge Plan:   Met with patient to review plan of care. Pt lives with his wife who is able to provide any assistance that he needs. She does the driving. Pt states his daughter is a nurse and provides support as needed. Pt expects to go home with no services.   He is not interested in therapy at home or outpt. PT eval is pending. ID has been consulted and is pending. PLAN  Home with wife. F/U with ID consult and PT eval  Pt refusing home care or SNF.   Denies need for any DME        Electronically signed by Ying Quigley on 8/26/20 at 3:59 PM EDT

## 2020-08-26 NOTE — PROGRESS NOTES
HYDROcodone 5 mg - acetaminophen    Objective     Vitals:  Patient Vitals for the past 8 hrs:   BP Temp Temp src Pulse Resp SpO2 Weight   20 0711 96/66 98.3 °F (36.8 °C) Oral 57 18 96 % --   20 0335 -- -- -- -- -- -- 184 lb 6.4 oz (83.6 kg)   20 0331 118/65 98.1 °F (36.7 °C) Oral 54 18 97 % --     Average, Min, and Max for last 24 hours Vitals:  TEMPERATURE:  Temp  Av.3 °F (36.8 °C)  Min: 97.8 °F (36.6 °C)  Max: 98.6 °F (37 °C)    RESPIRATIONS RANGE: Resp  Av.7  Min: 16  Max: 18    PULSE RANGE: Pulse  Av.4  Min: 52  Max: 69    BLOOD PRESSURE RANGE:  Systolic (34IPT), LTY:361 , Min:96 , KNOTT:498   ; Diastolic (03YSV), DJ, Min:65, Max:77      PULSE OXIMETRY RANGE: SpO2  Av.5 %  Min: 95 %  Max: 98 %    I/O last 3 completed shifts: In: 1898 [P. O.:600; I.V.:1298]  Out: 300 [Urine:300]    CBC:  Recent Labs     20  0900 20  0543   WBC 5.5 4.1   HGB 11.2* 10.3*   HCT 35.8* 33.0*    256   CRP 52.3*  --         BMP:  Recent Labs     20  0900 20  0543    139   K 3.6* 3.9    106   CO2 23 24   BUN 20 17   CREATININE 1.66* 1.56*   GLUCOSE 104* 102*   CALCIUM 8.3* 7.8*        Coags:  No results for input(s): APTT, PROT, INR in the last 72 hours. Lab Results   Component Value Date    SEDRATE 58 (H) 2020     Recent Labs     20  0900   CRP 52.3*       Lower Extremity Physical Exam:  Vascular: DP pulse is palpable, PT pulse nonpalpable on the right. CFT <5 seconds to all digits. Hair growth is absent to the level of the digits. +1 pitting edema to the right ankle.       Neuro: Saph/sural/SP/DP/plantar sensation intact to light touch.     Musculoskeletal: Muscle strength is 4/5 secondary to pain and swelling. Gross deformity is present, s/p right 4th digit amputation. Pain on palpation to the medial and lateral ankle.     Dermatologic: No open lesions appreciated.  There is erythema with an associated increase in warmth, erythema is

## 2020-08-27 VITALS
SYSTOLIC BLOOD PRESSURE: 100 MMHG | RESPIRATION RATE: 18 BRPM | OXYGEN SATURATION: 96 % | HEIGHT: 68 IN | BODY MASS INDEX: 28.39 KG/M2 | HEART RATE: 51 BPM | TEMPERATURE: 97.5 F | WEIGHT: 187.3 LBS | DIASTOLIC BLOOD PRESSURE: 81 MMHG

## 2020-08-27 LAB
ANION GAP SERPL CALCULATED.3IONS-SCNC: 12 MMOL/L (ref 9–17)
BUN BLDV-MCNC: 15 MG/DL (ref 8–23)
BUN/CREAT BLD: 10 (ref 9–20)
C-REACTIVE PROTEIN: 46.8 MG/L (ref 0–5)
CALCIUM SERPL-MCNC: 8.5 MG/DL (ref 8.6–10.4)
CHLORIDE BLD-SCNC: 104 MMOL/L (ref 98–107)
CO2: 23 MMOL/L (ref 20–31)
CREAT SERPL-MCNC: 1.46 MG/DL (ref 0.7–1.2)
GFR AFRICAN AMERICAN: 58 ML/MIN
GFR NON-AFRICAN AMERICAN: 48 ML/MIN
GFR SERPL CREATININE-BSD FRML MDRD: ABNORMAL ML/MIN/{1.73_M2}
GFR SERPL CREATININE-BSD FRML MDRD: ABNORMAL ML/MIN/{1.73_M2}
GLUCOSE BLD-MCNC: 143 MG/DL (ref 70–99)
HCT VFR BLD CALC: 35.3 % (ref 40.7–50.3)
HEMOGLOBIN: 10.9 G/DL (ref 13–17)
MCH RBC QN AUTO: 27.1 PG (ref 25.2–33.5)
MCHC RBC AUTO-ENTMCNC: 30.9 G/DL (ref 28.4–34.8)
MCV RBC AUTO: 87.8 FL (ref 82.6–102.9)
NRBC AUTOMATED: 0 PER 100 WBC
PDW BLD-RTO: 13.8 % (ref 11.8–14.4)
PLATELET # BLD: 292 K/UL (ref 138–453)
PMV BLD AUTO: 10.1 FL (ref 8.1–13.5)
POTASSIUM SERPL-SCNC: 3.8 MMOL/L (ref 3.7–5.3)
RBC # BLD: 4.02 M/UL (ref 4.21–5.77)
SODIUM BLD-SCNC: 139 MMOL/L (ref 135–144)
WBC # BLD: 3.7 K/UL (ref 3.5–11.3)

## 2020-08-27 PROCEDURE — 97530 THERAPEUTIC ACTIVITIES: CPT

## 2020-08-27 PROCEDURE — 6360000002 HC RX W HCPCS: Performed by: NURSE PRACTITIONER

## 2020-08-27 PROCEDURE — 2580000003 HC RX 258: Performed by: NURSE PRACTITIONER

## 2020-08-27 PROCEDURE — 36415 COLL VENOUS BLD VENIPUNCTURE: CPT

## 2020-08-27 PROCEDURE — 97535 SELF CARE MNGMENT TRAINING: CPT

## 2020-08-27 PROCEDURE — 86140 C-REACTIVE PROTEIN: CPT

## 2020-08-27 PROCEDURE — 85027 COMPLETE CBC AUTOMATED: CPT

## 2020-08-27 PROCEDURE — APPSS45 APP SPLIT SHARED TIME 31-45 MINUTES: Performed by: NURSE PRACTITIONER

## 2020-08-27 PROCEDURE — 99232 SBSQ HOSP IP/OBS MODERATE 35: CPT | Performed by: INTERNAL MEDICINE

## 2020-08-27 PROCEDURE — 99231 SBSQ HOSP IP/OBS SF/LOW 25: CPT | Performed by: INTERNAL MEDICINE

## 2020-08-27 PROCEDURE — 6370000000 HC RX 637 (ALT 250 FOR IP): Performed by: NURSE PRACTITIONER

## 2020-08-27 PROCEDURE — 2580000003 HC RX 258: Performed by: INTERNAL MEDICINE

## 2020-08-27 PROCEDURE — 97110 THERAPEUTIC EXERCISES: CPT

## 2020-08-27 PROCEDURE — 97162 PT EVAL MOD COMPLEX 30 MIN: CPT

## 2020-08-27 PROCEDURE — 97116 GAIT TRAINING THERAPY: CPT

## 2020-08-27 PROCEDURE — 6360000002 HC RX W HCPCS: Performed by: INTERNAL MEDICINE

## 2020-08-27 PROCEDURE — 97166 OT EVAL MOD COMPLEX 45 MIN: CPT

## 2020-08-27 PROCEDURE — 80048 BASIC METABOLIC PNL TOTAL CA: CPT

## 2020-08-27 RX ORDER — CEPHALEXIN 500 MG/1
500 CAPSULE ORAL 2 TIMES DAILY
Qty: 20 CAPSULE | Refills: 0 | Status: SHIPPED | OUTPATIENT
Start: 2020-08-27 | End: 2020-09-06

## 2020-08-27 RX ADMIN — CEFAZOLIN SODIUM 1 G: 1 INJECTION, POWDER, FOR SOLUTION INTRAMUSCULAR; INTRAVENOUS at 02:09

## 2020-08-27 RX ADMIN — FERROUS SULFATE TAB EC 325 MG (65 MG FE EQUIVALENT) 325 MG: 325 (65 FE) TABLET DELAYED RESPONSE at 09:40

## 2020-08-27 RX ADMIN — SODIUM CHLORIDE: 9 INJECTION, SOLUTION INTRAVENOUS at 04:45

## 2020-08-27 RX ADMIN — ASPIRIN 81 MG: 81 TABLET, COATED ORAL at 09:40

## 2020-08-27 RX ADMIN — CEFAZOLIN SODIUM 1 G: 1 INJECTION, POWDER, FOR SOLUTION INTRAMUSCULAR; INTRAVENOUS at 09:45

## 2020-08-27 RX ADMIN — OXYCODONE HYDROCHLORIDE AND ACETAMINOPHEN 500 MG: 500 TABLET ORAL at 09:40

## 2020-08-27 RX ADMIN — LEVOTHYROXINE SODIUM 175 MCG: 0.15 TABLET ORAL at 06:08

## 2020-08-27 RX ADMIN — LISINOPRIL 5 MG: 5 TABLET ORAL at 09:40

## 2020-08-27 RX ADMIN — CHOLECALCIFEROL TAB 125 MCG (5000 UNIT) 5000 UNITS: 125 TAB at 09:40

## 2020-08-27 RX ADMIN — HYDROCHLOROTHIAZIDE 25 MG: 25 TABLET ORAL at 09:40

## 2020-08-27 RX ADMIN — HEPARIN SODIUM 5000 UNITS: 5000 INJECTION INTRAVENOUS; SUBCUTANEOUS at 14:01

## 2020-08-27 RX ADMIN — HEPARIN SODIUM 5000 UNITS: 5000 INJECTION INTRAVENOUS; SUBCUTANEOUS at 06:08

## 2020-08-27 RX ADMIN — PANTOPRAZOLE SODIUM 40 MG: 40 TABLET, DELAYED RELEASE ORAL at 06:08

## 2020-08-27 ASSESSMENT — ENCOUNTER SYMPTOMS
EYES NEGATIVE: 1
RESPIRATORY NEGATIVE: 1
GASTROINTESTINAL NEGATIVE: 1
ALLERGIC/IMMUNOLOGIC NEGATIVE: 1

## 2020-08-27 NOTE — PROGRESS NOTES
Learning About the Safe Use of Antibiotics  Introduction  Antibiotics are drugs used to kill bacteria. Bacteria can cause infections. These include strep throat, ear infections, and pneumonia. These medicines can't cure everything. They don't kill viruses or help with allergies. They don't help illnesses such as the common cold, the flu, or a runny nose. And they can cause side effects. There are many types of antibiotics. Your doctor will decide which one will work best for your infection. Examples include:  · Amoxicillin. · Cephalexin (Keflex). · Ciprofloxacin (Cipro). What are the possible side effects? Side effects can include:  · Nausea. · Diarrhea. · Skin rash. · Yeast infection. · A severe allergic reaction. It may cause itching, swelling, and breathing problems. This is rare. You may have other side effects or reactions not listed here. Check the information that comes with your medicine. Should you take antibiotics just in case? Don't take antibiotics when you don't need them. If you do that, they may not work when you do need them. Each time you take antibiotics, you are more likely to have some bacteria that survive and aren't killed by the medicine. Bacteria that don't die can change and become even harder to kill. These are called antibiotic-resistant bacteria. They can cause longer and more serious infections. To treat them, you may need different, stronger antibiotics that have more side effects and may cost more. So always ask your doctor if antibiotics are the best treatment. Explain that you do not want antibiotics unless you need them. Help protect the community  Using antibiotics when they're not needed leads to the development of antibiotic-resistant bacteria. These tougher bacteria can spread to family members, children, and coworkers. People in your community will have a risk of getting an infection that is harder to cure and that costs more to treat.   How can you take antibiotics safely? Be safe with medicine. Take your antibiotics as directed. Do not stop taking them just because you feel better. You need to take the full course of medicine. This will help make sure your infection is cured. It will also help prevent the growth of antibiotic-resistant bacteria. Always take the exact amount that the label says to take. If the label says to take the medicine at a certain time, follow those directions. You might feel better after you take an antibiotic for a few days. But it is important to keep taking it for as long as prescribed. That will help you get rid of those bacteria that are a bit stronger and that survive the first few days of treatment. Where can you learn more? Go to https://[employer]. org and sign in to your [employer] account. Enter J523 in the [employer] box to learn more about \"Learning About the Safe Use of Antibiotics. \"     If you do not have an account, please click on the \"Sign Up Now\" link. Current as of: March 3, 2017  Content Version: 11.3  © 3158-5711 [employer]. Care instructions adapted under license by [facility] [place] ([facility]). If you have questions about a medical condition or this instruction, always ask your healthcare professional. [place] [address] any warranty or liability for your use of this information. Antibiotics are powerful drugs that are generally safe and very helpful in fighting disease, but there are times when antibiotics can actually be harmful. Antibiotics can have side effects, including allergic reactions and a potentially deadly diarrhea caused by the bacteria Clostridium difficile (C. diff). Antibiotics can also interfere with the action of other drugs a patient may be taking for another condition. These unintended reactions to antibiotics are called adverse drug events.    When someone takes an antibiotic that they do not need, they are needlessly exposed to the side effects of the drug and do not get any benefit from it. Moreover, taking an antibiotic when it is not needed can lead to the development of antibiotic resistance. When resistance develops, antibiotics may not be able to stop future infections. Every time someone takes an antibiotic they dont need, they increase their risk of developing a resistant infection in the future. Types of Adverse Drug Events Related to Antibiotics  Allergic Reactions  Every year, there are more than 140,000 emergency department visits for reactions to antibiotics. Almost four out of five (79%) emergency department visits for antibiotic-related adverse drug events are due to an allergic reaction. These reactions can range from mild rashes and itching to serious blistering skin reactions swelling of the face and throat, and breathing problems. Minimizing unnecessary antibiotic use is the best way to reduce the risk of adverse drug events from antibiotics. Patients should tell their doctors about any past drug reactions or allergies. C. difficile  C. difficile causes diarrhea linked to at least 14,000 [place] deaths each year. When a person takes antibiotics, good bacteria that protect against infection are destroyed for several months. During this time, patients can get sick from C. difficile picked up from contaminated surfaces or spread from a healthcare providers hands. Those most at risk are people, especially older adults, who take antibiotics and also get medical care. Take antibiotics exactly and only as prescribed. Drug Interactions and Side Effects  Antibiotics can interact with other drugs patients take, making those drugs or the antibiotics less effective. Some drug combinations can worsen the side effects of the antibiotic or other drug. Common side effects of antibiotics include nausea, diarrhea, and stomach pain. Sometimes these symptoms can lead to dehydration and other problems.  Patients should ask their doctors about drug interactions and the potential side effects of antibiotics.  The doctor should be told immediately if a patient has any side effects from antibiotics  Page last updated: February 24, 2017 Content source:   Centers for Disease Control and [employer] for Emerging and Zoonotic Infectious Diseases (Silverio Mitchell)  Division of Healthcare Quality Promotion [facility], [facility]

## 2020-08-27 NOTE — PLAN OF CARE
Problem: Pain:  Goal: Pain level will decrease  Description: Pain level will decrease  Outcome: Ongoing  Goal: Control of acute pain  Description: Control of acute pain  Outcome: Ongoing  Goal: Control of chronic pain  Description: Control of chronic pain  Outcome: Ongoing     Problem: Skin Integrity:  Goal: Will show no infection signs and symptoms  Description: Will show no infection signs and symptoms  Outcome: Ongoing  Goal: Absence of new skin breakdown  Description: Absence of new skin breakdown  Outcome: Ongoing

## 2020-08-27 NOTE — CARE COORDINATION
Pt stated does not have walker at home. Spoke to Pau Toribio from [email or web address] and face sheet and script faxed. To be delivered to patients home.

## 2020-08-27 NOTE — PROGRESS NOTES
[facility]  Office: [phone]  Parminder Sana, DO, Essence Rony, DO, Marilou [facility], DO, ro [place] [facility], DO, Quiana Zamora MD, Griselda Abbott MD, hTad Huang MD, Jose David Cartagena MD, Christina Hernández MD, Dereck Mandujano MD, Parish Tellez MD, Rosina Tracey MD, Joanna Cowan MD, Linda Fritz DO, Janay Riojas MD, Ping Burton MD, Jimmy Daley DO, Gregor Titus MD,  Ramonita Lefort, DO, Corrie Zapata MD, Dexter Cartagena MD, Jacquelyne Shone, [facility], [facility], [facility], Kelly Spence, CNP, Estuardo Figueroa, CNS, Jose Huffman, CNP, Wes Stout, CNP, Linda Leal, CNP, Anita Briscoe, CNP, Balbina Saul, CNP, Patricia Billings PA-C, Florence Whitaker, [facility], Tiffanie Victoria, CNP, Elsa New, CNP, Alissa Collins, CNP, Chino Lyn, CNP, Rodrigo Byrne, [facility]    Progress Note    8/27/2020    9:49 AM    Name:   Sarmad Webster. MRN:     [id number]     Acct:      [de-identified]   Room:   [address] [place] [address] Day:  2  Admit Date:  8/25/2020  8:40 AM    PCP:   Calitsa Cordoba MD  Code Status:  Full Code    Subjective:     C/C:   Chief Complaint   Patient presents with    Foot Swelling    Wheezing    Cough     Interval History Status: significantly improved. 8/27: Afebrile, Vital signs stable. Patient participating in therapy. States he would like to and is able to go home today. No erythema, pain, or edema present in RLE. Podiatry agreeable with discharge today as well. Brief History:     Iraj Rashid Sr. is a 71 y.o. / male who presents with Foot Swelling;  Wheezing; and Cough   and is admitted to the hospital for the management of Cellulitis.     Patient presented to the emergency department with a chief complaint of swelling, pain, and redness of the right ankle and foot that started yesterday. Mario Cheung denies any injury or history of injury. Mario Cheung does have a history of a toe amputation of the right foot in February of this year.  Other history includes hypertension, hypothyroidism, BPH, CKD, GERD, and positive hepatitis C antibody.  Podiatry was consulted by the emergency department and evaluated the patient. Krupa Blackburn is being admitted for further evaluation and management of cellulitis of the right foot. Review of Systems:     Review of Systems   Constitutional: Negative. HENT: Negative. Eyes: Negative. Respiratory: Negative. Cardiovascular: Negative. Gastrointestinal: Negative. Endocrine: Negative. Genitourinary: Negative. Musculoskeletal: Negative. Skin: Negative. Allergic/Immunologic: Negative. Neurological: Negative. Hematological: Negative. Psychiatric/Behavioral: Negative. Medications:      Allergies:  No Known Allergies    Current Meds:   Scheduled Meds:    ceFAZolin  1 g Intravenous Q8H    aspirin  81 mg Oral Daily    carvedilol  6.25 mg Oral BID WC    ferrous sulfate  325 mg Oral Daily    hydroCHLOROthiazide  25 mg Oral Daily    levothyroxine  175 mcg Oral Daily    lisinopril  5 mg Oral Daily    pantoprazole  40 mg Oral QAM AC    tamsulosin  0.4 mg Oral QPM    vitamin C  500 mg Oral Daily    vitamin D3  1 tablet Oral Daily    sodium chloride flush  10 mL Intravenous 2 times per day    heparin (porcine)  5,000 Units Subcutaneous 3 times per day     Continuous Infusions:    sodium chloride Stopped (08/27/20 0945)     PRN Meds: melatonin, sodium chloride flush, potassium chloride **OR** potassium alternative oral replacement **OR** potassium chloride, magnesium sulfate, acetaminophen **OR** acetaminophen, polyethylene glycol, promethazine **OR** ondansetron, nicotine, HYDROcodone 5 mg - acetaminophen **OR** HYDROcodone 5 mg - acetaminophen    Data:     Past Medical History:   has a past medical history of Absence of toe ([facility] Acquired hypothyroidism, Acute kidney injury ([facility] CARLTON (acute kidney injury) ([facility] [address] [facility] Anemia due to stage 3 chronic kidney disease ([facility] Arthritis, Benign hypertension with chronic kidney disease, stage IV (HCC), Benign prostatic hyperplasia, Benign prostatic hyperplasia without lower urinary tract symptoms, Bradycardia, Bunion of right foot, Cellulitis of left upper extremity, Cellulitis of left upper limb, Chronic renal failure syndrome, Elevated lactic acid level, Epilepsy ([address] Erectile dysfunction, Esophagitis determined by endoscopy, Essential hypertension, Family history of diabetes mellitus in brother, Fever, Former cigarette smoker, Full dentures, Gastro-esophageal reflux disease with esophagitis, Hepatitis C antibody positive in blood, Hiatal hernia, Hiatal hernia with gastroesophageal reflux, High anion gap metabolic acidosis, History of colonic polyps, Hypertension, Hypertensive disorder, Hypothyroidism, Increased lactic acid level, Influenza A, Need for influenza vaccination, Normocytic normochromic anemia, Osteomyelitis (HCC), P-ANCA and MPO antibodies positive, Pneumonia, Renal agenesis, Sepsis ([address] Severe sepsis ([address] Severe sepsis with acute organ dysfunction ([address] Skin necrosis ([address] Solitary kidney, congenital, Spider bite, Urinary retention, Vitamin D deficiency, and Wears glasses. Social History:   reports that he quit smoking about 29 years ago. His smoking use included cigarettes. He started smoking about 55 years ago. He has a 10.20 pack-year smoking history. He has never used smokeless tobacco. He reports current alcohol use. He reports previous drug use. Drug: Marijuana.      Family History:   Family History   Problem Relation Age of Onset    High Blood Pressure Mother     Arthritis Mother     Heart Disease Sister     Stomach Cancer Sister     Diabetes Brother        Vitals:  /81   Pulse 51   Temp 97.5 °F (36.4 °C) (Oral)   Resp 18   Ht 5' 8\" (1.727 m)   Wt 187 lb 4.8 oz (85 kg)   SpO2 96%   BMI 28.48 kg/m²   Temp (24hrs), Av.8 °F (36.6 °C), Min:97.3 °F (36.3 °C), Max:98.7 °F (37.1 °C)    No results for input(s): POCGLU in the last 72 hours. I/O (24Hr): Intake/Output Summary (Last 24 hours) at 8/27/2020 0949  Last data filed at 8/27/2020 0944  Gross per 24 hour   Intake 2174.64 ml   Output 1675 ml   Net 499.64 ml       Labs:  Hematology:  Recent Labs     08/25/20  0900 08/26/20  0543 08/27/20  0841   WBC 5.5 4.1 3.7   RBC 4.12* 3.76* 4.02*   HGB 11.2* 10.3* 10.9*   HCT 35.8* 33.0* 35.3*   MCV 86.9 87.8 87.8   MCH 27.2 27.4 27.1   MCHC 31.3 31.2 30.9   RDW 13.5 13.7 13.8    256 292   MPV 10.2 10.3 10.1   SEDRATE 58*  --   --    CRP 52.3*  --   --      Chemistry:  Recent Labs     08/25/20  0900 08/26/20  0543 08/27/20  0841    139 139   K 3.6* 3.9 3.8    106 104   CO2 23 24 23   GLUCOSE 104* 102* 143*   BUN 20 17 15   CREATININE 1.66* 1.56* 1.46*   ANIONGAP 10 9 12   LABGLOM 41* 44* 48*   GFRAA 50* 54* 58*   CALCIUM 8.3* 7.8* 8.5*     Recent Labs     08/26/20  0543   URICACID 5.7     ABG:  Lab Results   Component Value Date    PH 7.54 11/01/2015    PCO2 21 11/01/2015    PO2 27 11/01/2015    HCO3 18.1 11/01/2015    O2SAT 62 11/01/2015    FIO2 NOT REPORTED 11/01/2015     Lab Results   Component Value Date/Time    SPECIAL CULTURE 02/15/2020 02:27 PM     Lab Results   Component Value Date/Time    CULTURE (A) 02/15/2020 02:27 PM     STAPHYLOCOCCUS AUREUS LIGHT GROWTH This isolate is methicillin susceptible. CULTURE NO ANAEROBIC ORGANISMS ISOLATED AT 5 DAYS (A) 02/15/2020 02:27 PM       Radiology:  [place] Ankle Right (min 3 Views)    Result Date: 8/25/2020  No acute bony or joint space abnormality     Xr Foot Right (min 3 Views)    Result Date: 8/25/2020  No acute osseous abnormality of the right foot. No evidence of osteomyelitis. Xr Chest Portable    Result Date: 8/25/2020  1. No active pulmonary disease. Physical Examination:        Physical Exam  Vitals signs and nursing note reviewed. Constitutional:       General: He is not in acute distress.      Appearance: Normal appearance. He is normal weight. He is not ill-appearing, toxic-appearing or diaphoretic. HENT:      Head: Normocephalic and atraumatic. Right Ear: External ear normal.      Left Ear: External ear normal.      Nose: Nose normal. No congestion or rhinorrhea. Mouth/Throat:      Mouth: Mucous membranes are moist.   Eyes:      General: No scleral icterus. Right eye: No discharge. Left eye: No discharge. Extraocular Movements: Extraocular movements intact. Conjunctiva/sclera: Conjunctivae normal.      Pupils: Pupils are equal, round, and reactive to light. Neck:      Musculoskeletal: Normal range of motion and neck supple. No neck rigidity or muscular tenderness. Vascular: No carotid bruit. Cardiovascular:      Rate and Rhythm: Normal rate and regular rhythm. Pulses: Normal pulses. Heart sounds: Normal heart sounds. No murmur. No friction rub. No gallop. Pulmonary:      Effort: Pulmonary effort is normal. No respiratory distress. Breath sounds: Normal breath sounds. No stridor. No wheezing, rhonchi or rales. Chest:      Chest wall: No tenderness. Abdominal:      General: Bowel sounds are normal. There is no distension. Palpations: Abdomen is soft. There is no mass. Tenderness: There is no abdominal tenderness. There is no guarding or rebound. Hernia: No hernia is present. Musculoskeletal:         General: No swelling, tenderness, deformity or signs of injury. Right lower leg: No edema. Left lower leg: No edema. Lymphadenopathy:      Cervical: No cervical adenopathy. Skin:     General: Skin is warm and dry. Capillary Refill: Capillary refill takes less than 2 seconds. Coloration: Skin is not jaundiced or pale. Findings: No bruising, erythema, lesion or rash. Neurological:      General: No focal deficit present. Mental Status: He is alert and oriented to person, place, and time.       Sensory: No sensory

## 2020-08-27 NOTE — PROGRESS NOTES
STAY WITH ME protocol in place  Bed/Chair alarm on  Signs in place  Fall risk sticker to wrist band  Non-skid socks on/at bedside  Adequate lighting provided  Room/floor free of clutter  Bed low and locked  Call light in reach  [facility] in place for patient/family to view & ask questions.

## 2020-08-27 NOTE — PROGRESS NOTES
Physical Therapy    Facility/Department: [facility]  Initial Assessment    NAME: Maritza Matthews Sr.  : 1950  MRN: [id number]    Date of Service: 2020    Discharge Recommendations:  Continue to assess pending progress, Subacute/Skilled Nursing Facility   PT Equipment Recommendations  Other: Pending progress and WB status of pt, d/c may change to home with assist PRN    Joie Rashid. is a 71 y.o. male who presents to the emergency department for pain and swelling in the right ankle and foot. It started 2 days ago. His daughter gives most of the history. On the way here he developed a slight cough as well. She is worried about an infection in the bone of his foot or a blood clot in his leg. He has not had a fever or productive cough. Symptom is continuous. He rated the pain as a 6. RN reports patient is medically stable for therapy treatment this date. Chart reviewed prior to treatment and patient is agreeable for therapy. All lines intact and patient positioned comfortably at end of treatment. All patient needs addressed prior to ending therapy session. Assessment   Body structures, Functions, Activity limitations: Decreased functional mobility ; Decreased strength;Decreased safe awareness;Decreased balance;Decreased endurance  Assessment: Pt agreeable and cooperative to work with PT. WB status limiting pt at this time. Prognosis: Good  Decision Making: Medium Complexity  Clinical Presentation: evolving  PT Education: Goals;Plan of Care;Precautions;Gait Training;General Safety;Transfer Training;Weight-bearing Education; Functional Mobility Training  Patient Education: emphasis on WB status, as pt is poor historian and forgetful at times.   REQUIRES PT FOLLOW UP: Yes  Activity Tolerance  Activity Tolerance: Patient Tolerated treatment well  Activity Tolerance: Pt demonstrated wheezing at time, educated on deep breathing and took rest breaks when needed       Patient Diagnosis(es): The encounter diagnosis was Cellulitis of right leg.     has a past medical history of Absence of toe ([address] Acquired hypothyroidism, Acute kidney injury ([address] CARLTON (acute kidney injury) ([address] Anemia due to stage 3 chronic kidney disease ([address] Arthritis, Benign hypertension with chronic kidney disease, stage IV ([address] Benign prostatic hyperplasia, Benign prostatic hyperplasia without lower urinary tract symptoms, Bradycardia, Bunion of right foot, Cellulitis of left upper extremity, Cellulitis of left upper limb, Chronic renal failure syndrome, Elevated lactic acid level, Epilepsy ([address] Erectile dysfunction, Esophagitis determined by endoscopy, Essential hypertension, Family history of diabetes mellitus in brother, Fever, Former cigarette smoker, Full dentures, Gastro-esophageal reflux disease with esophagitis, Hepatitis C antibody positive in blood, Hiatal hernia, Hiatal hernia with gastroesophageal reflux, High anion gap metabolic acidosis, History of colonic polyps, Hypertension, Hypertensive disorder, Hypothyroidism, Increased lactic acid level, Influenza A, Need for influenza vaccination, Normocytic normochromic anemia, Osteomyelitis (HCC), P-ANCA and MPO antibodies positive, Pneumonia, Renal agenesis, Sepsis ([address] Severe sepsis ([address] Severe sepsis with acute organ dysfunction ([address] Skin necrosis ([address] Solitary kidney, congenital, Spider bite, Urinary retention, Vitamin D deficiency, and Wears glasses. has a past surgical history that includes Facial cosmetic surgery; Hand surgery; Colonoscopy; skin split graft (1/11/2016); eye surgery (Bilateral); arthrodesis (Right, 09/07/2018); pr office/outpt visit,procedure only (Right, 9/7/2018); pr office/outpt visit,procedure only (Right, 11/9/2018); Toe amputation (Right, 02/15/2020); and Toe amputation (Right, 2/15/2020).     Restrictions  Restrictions/Precautions  Restrictions/Precautions: Weight Bearing, General Precautions, Fall Risk  Lower Extremity Weight Bearing Restrictions  Right Lower Extremity Weight Bearing: Non Weight Bearing  Position Activity Restriction  Other position/activity restrictions: IV pole  Vision/Hearing  Vision: Impaired  Vision Exceptions: Wears glasses for reading  Hearing: Within functional limits     Subjective  General  Chart Reviewed: Yes  Patient assessed for rehabilitation services?: Yes  Response To Previous Treatment: Not applicable  Family / Caregiver Present: No  Follows Commands: Within Functional Limits  Subjective  Subjective: Pt reports he is in no pain. Pain Screening  Patient Currently in Pain: Denies          Orientation  Orientation  Overall Orientation Status: Within Functional Limits  Social/Functional History  Social/Functional History  Lives With: Spouse  Type of Home: House  Home Layout: Multi-level, Bed/Bath upstairs(split level home, 4 steps to upstairs, 3 steps down to living room. Full bath on main level)  Home Access: Stairs to enter with rails  Entrance Stairs - Number of Steps: 3  Entrance Stairs - Rails: Right  Bathroom Shower/Tub: Walk-in shower  Bathroom Toilet: Standard  Bathroom Equipment: Grab bars in shower, Grab bars around toilet, Shower chair  Home Equipment: Crutches, Wheelchair-manual(w/c for community distances, crutches in home when in pain)  Receives Help From: Family  ADL Assistance: [address] [place] [address] Avenue: (wife does most house work)  Ambulation Assistance: Independent  Transfer Assistance: Independent  Active : No  Patient's  Info: wife  Occupation: Retired  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[employer], body shop  Leisure & Hobbies: watching [place] movies, plays pool  Cognition   Cognition  Overall Cognitive Status: Exceptions  Arousal/Alertness: Appropriate responses to stimuli  Following Commands: Follows one step commands with increased time; Follows one step commands with repetition  Attention Span: Appears intact  Memory: Decreased long term memory  Safety Judgement: Decreased Fair(one leg stance due to WB status)  Standing - Dynamic: Poor(BUE support from RW)  Exercises  Gluteal Sets: 20 reps  Hip Flexion: 20 reps  Knee Long Arc Quad: 10 reps bilateral  Ankle Pumps: 20 reps  Comments: Verbal cueing for proper form and technique     Plan   Plan  Times per week: 1-2x day, 5-6 days per week  Current Treatment Recommendations: Strengthening, Transfer Training, Endurance Training, Balance Training, Gait Training, Functional Mobility Training, Stair training, Safety Education & Training, Home Exercise Program  Safety Devices  Type of devices: All fall risk precautions in place, Call light within reach, Chair alarm in place, Gait belt, Nurse notified, Left in chair      AM-PAC Score  AM-PAC Inpatient Mobility Raw Score : 18 (08/27/20 0917)  AM-PAC Inpatient T-Scale Score : 43.63 (08/27/20 0917)  Mobility Inpatient CMS 0-100% Score: 46.58 (08/27/20 0917)  Mobility Inpatient CMS G-Code Modifier : CK (08/27/20 0917)          Goals  Short term goals  Time Frame for Short term goals: 12 visits  Short term goal 1: Pt will perform bed mobility indep. Short term goal 2: Pt will transfer with SBA  Short term goal 3: Pt will ambulate maintaining WB status with RW  Short term goal 4: Pt will tolerate 25mins of PT including exercises / balance / gait training to improve functional mobility and activity tolerance  Patient Goals   Patient goals :  To return home and see brother       Therapy Time   Individual Concurrent Group Co-treatment   Time In 0830         Time Out 0919         Minutes 52          TX time: 37mins       Rich Mcgrath PT

## 2020-08-27 NOTE — PROGRESS NOTES
Occupational Therapy   Occupational Therapy Initial Assessment  Date: 2020   Patient Name: Deb Mccabe. MRN: [id number]     : 1950    Date of Service: 2020     RN Indu Martines reports patient is medically stable for therapy treatment this date. Chart reviewed prior to treatment and patient is agreeable for therapy. All lines intact and patient positioned comfortably at end of treatment. All patient needs addressed prior to ending therapy session. Discharge Recommendations:  Home with assist PRN  OT Equipment Recommendations  Equipment Needed: Yes  Mobility Devices: Guy Lavender; ADL Assistive Devices  ADL Assistive Devices: Reacher;Long-handled Sponge;Emergency Alert System    Assessment   Performance deficits / Impairments: Decreased functional mobility ; Decreased ADL status; Decreased strength;Decreased safe awareness;Decreased cognition;Decreased endurance;Decreased fine motor control;Decreased posture  Assessment: Pt would benefit from continued skilled OT services while IP to increase I and safery during functional tasks to return [facility] at [facility] with assist as needed. Prognosis: Fair  Decision Making: Medium Complexity  OT Education: OT Role;Plan of Care;Transfer Training;Energy Conservation;Equipment  Patient Education: safety in function, RW use/safety, fall prevention/call light use, recommendations for continued therapy services while IP  Barriers to Learning: cognitive/memory deficits  REQUIRES OT FOLLOW UP: Yes  Activity Tolerance  Activity Tolerance: Patient Tolerated treatment well;Patient limited by fatigue  Activity Tolerance: Fair minus. Pt requested to take a shower and shave, required multiple cues for safety and pacing self throughout ADL task. Safety Devices  Safety Devices in place: Yes  Type of devices: Bed alarm in place;Call light within reach;Gait belt;Patient at risk for falls; Left in bed;Nurse notified           Patient Diagnosis(es): The encounter diagnosis was Cellulitis of right leg.     has a past medical history of Absence of toe ([address] Acquired hypothyroidism, Acute kidney injury ([address] CARLTON (acute kidney injury) ([address] Anemia due to stage 3 chronic kidney disease ([address] Arthritis, Benign hypertension with chronic kidney disease, stage IV ([address] Benign prostatic hyperplasia, Benign prostatic hyperplasia without lower urinary tract symptoms, Bradycardia, Bunion of right foot, Cellulitis of left upper extremity, Cellulitis of left upper limb, Chronic renal failure syndrome, Elevated lactic acid level, Epilepsy ([address] Erectile dysfunction, Esophagitis determined by endoscopy, Essential hypertension, Family history of diabetes mellitus in brother, Fever, Former cigarette smoker, Full dentures, Gastro-esophageal reflux disease with esophagitis, Hepatitis C antibody positive in blood, Hiatal hernia, Hiatal hernia with gastroesophageal reflux, High anion gap metabolic acidosis, History of colonic polyps, Hypertension, Hypertensive disorder, Hypothyroidism, Increased lactic acid level, Influenza A, Need for influenza vaccination, Normocytic normochromic anemia, Osteomyelitis (HCC), P-ANCA and MPO antibodies positive, Pneumonia, Renal agenesis, Sepsis ([address] Severe sepsis ([address] Severe sepsis with acute organ dysfunction ([address] Skin necrosis ([address] Solitary kidney, congenital, Spider bite, Urinary retention, Vitamin D deficiency, and Wears glasses. has a past surgical history that includes Facial cosmetic surgery; Hand surgery; Colonoscopy; skin split graft (1/11/2016); eye surgery (Bilateral); arthrodesis (Right, 09/07/2018); pr office/outpt visit,procedure only (Right, 9/7/2018); pr office/outpt visit,procedure only (Right, 11/9/2018); Toe amputation (Right, 02/15/2020); and Toe amputation (Right, 2/15/2020). PER H&P:  Bradley Gongora Sr. is a 71 y.o. / male who presents with Foot Swelling;  Wheezing; and Cough   and is admitted to the hospital for the management of Cellulitis.     Patient presented to the emergency department with a chief complaint of swelling, pain, and redness of the right ankle and foot that started yesterday. He denies any injury or history of injury. He does have a history of a toe amputation of the right foot in February of this year. Other history includes hypertension, hypothyroidism, BPH, CKD, GERD, and positive hepatitis C antibody. Podiatry was consulted by the emergency department and evaluated the patient. He is being admitted for further evaluation and management of cellulitis of the right foot. Restrictions  Restrictions/Precautions  Restrictions/Precautions: Weight Bearing, General Precautions, Fall Risk  Lower Extremity Weight Bearing Restrictions  Right Lower Extremity Weight Bearing: Weight Bearing As Tolerated  Position Activity Restriction  Other position/activity restrictions: cardiac diet, up as fco, elevate affected limb, LUE IV, Alarms    Subjective   General  Chart Reviewed: Yes  Patient assessed for rehabilitation services?: Yes  Family / Caregiver Present: No  Patient Currently in Pain: Denies  Vital Signs  Patient Currently in Pain: Denies     Social/Functional History  Social/Functional History  Lives With: Spouse(Pt states wife is in good health and supportive)  Type of Home: House  Home Layout: Multi-level, Bed/Bath upstairs(Pt states he lives in a split level home, 4 steps to upstairs, 3 steps down to living room.  Full bath on main level)  Home Access: Stairs to enter with rails  Entrance Stairs - Number of Steps: 3  Entrance Stairs - Rails: Right  Bathroom Shower/Tub: Walk-in shower  Bathroom Toilet: Standard  Bathroom Equipment: Grab bars in shower, Grab bars around toilet, Shower chair  Home Equipment: [address] [facility] Help From: Family  ADL Assistance: Independent  Homemaking Assistance: Needs assistance(Pt states his wife does most house work)  Ambulation Assistance: Independent(Pt states he uses his w/c for community distances, crutches in home when in pain)  Transfer Assistance: Independent  Active : No  Patient's  Info: Pt states his wife drives him  Occupation: Retired  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[employer], [employer]  Leisure & Hobbies: watching [employer], [employer]  Additional Comments: Pt states he fell in the shower approx a month ago. Objective   Vision: Impaired(Pt states he has not had any recent visual changes.)  Vision Exceptions: Wears glasses for reading  Hearing: Exceptions to [facility]  Hearing Exceptions: Hard of hearing/hearing concerns    Orientation  Overall Orientation Status: Within Functional Limits  Observation/Palpation  Posture: Good  Observation: LUE IV  Edema: slight edema in BLE  Balance  Sitting Balance: Stand by assistance  Standing Balance: Stand by assistance  Standing Balance  Time: standing fco < 4 for selfcare and functional mobility task. Functional Mobility  Functional - Mobility Device: Rolling Walker  Activity: (bed to toilet, toilet to shower chair, shower chair to sink, sink around room and back to bed, bed to bathroom sink)  Assist Level: Stand by assistance  Functional Mobility Comments: MOD verbal cues to RW safety, step sequence with RW, upright posture, slowing down movements, pacing self, and pursed lip breathing all to increase safety and reduce falls. Toilet Transfers  Toilet - Technique: Ambulating(with RW)  Equipment Used: Grab bars  Toilet Transfer: Contact guard assistance  Toilet Transfers Comments: MOD verbal cues for squaring self/AD up to surface prior to sitting, pacing self, RW safety, hand placement on grab bar, slowing down movements, and controlled stand to sit all to reduce fall risk. Shower Transfers  Shower - Transfer From: Kallie Henriquez - Transfer Type: (toilet to shower chair.)  Shower - Transfer To:  Shower seat with back  Shower - Technique: Ambulating  Shower Transfers: Contact verbal cues for squaring self/AD up to surface prior to sitting, pacing self, RW safety, B handplacement, slowing down movements, and controlled stand to sit all to increase safety and reduce fall risk. Cognition  Overall Cognitive Status: Exceptions  Arousal/Alertness: Appropriate responses to stimuli  Following Commands: Follows one step commands with increased time; Follows one step commands with repetition  Attention Span: Appears intact  Memory: Decreased short term memory  Safety Judgement: Decreased awareness of need for assistance;Decreased awareness of need for safety  Problem Solving: Assistance required to generate solutions;Assistance required to identify errors made;Assistance required to correct errors made;Decreased awareness of errors;Assistance required to implement solutions  Insights: Decreased awareness of deficits  Initiation: Requires cues for some  Sequencing: Requires cues for some  Cognition Comment: Pt would repeat stories, although was able to recall precautions. Perception  Overall Perceptual Status: WFL     Sensation  Overall Sensation Status: WFL        LUE AROM (degrees)  LUE AROM : WFL  RUE AROM (degrees)  RUE AROM : WFL  LUE Strength  Gross LUE Strength: Exceptions to [facility]  LUE Strength Comment: grossly 4-/5  RUE Strength  Gross RUE Strength: Exceptions to [facility]  RUE Strength Comment: grossly 4/5                   Plan   Plan  Times per week: 4-5x/wk 1x/day as fco  Current Treatment Recommendations: Strengthening, Balance Training, Functional Mobility Training, Endurance Training, Safety Education & Training, Patient/Caregiver Education & Training, Equipment Evaluation, Education, & procurement, Self-Care / ADL, Home Management Training                          AM-PAC Score: 19              Goals  Short term goals  Time Frame for Short term goals: By discharge, pt to demo  Short term goal 1: bed mobility task to MOD I with use of bedrails as needed.   Short term goal 2: ADL transfers and functional mobility to MOD I with use of AD as needed. Short term goal 3: total body ADLs to MOD I with use of AD/Grab bars as needed. Short term goal 4: increased BUE strength by 1/2 grade/I with simple BUE HEP with use of handouts as needed to assist with self care tasks. Short term goal 5: increased standing fco > 8 mintues with SBA using AD as needed to increase safety during functional tasks. Long term goals  Long term goal 1: pt/caregiver to be I with fall prevention/EC/WS tech, and recommendations for AE/AD with use of handouts as needed. Patient Goals   Patient goals : To go home with my wife!        Therapy Time   Individual Concurrent Group Co-treatment   Time In 1040(plus 10 min for chart review/RN communication)         Time Out 1129         Minutes [address] [facility]

## 2020-08-27 NOTE — PROGRESS NOTES
Pt discharged to home in stable condition with [facility]  Discharge instructions given  Discharge checklist reviewed and completed with pt   Pt denies having any further questions at this time  Patient/family state they have everything they were admitted with.

## 2020-08-27 NOTE — PROGRESS NOTES
[facility]  Progress Note    Robin Rashid Sr. MRN: [id number]  Date: 8/27/2020  LOS: 2     Reason for F/U :   Cellulitis    Impression :   1. Right lower extremity cellulitis  2. Solitary kidney with chronic kidney disease stage III  3. Anemia of chronic disease  4. Essential hypertension    Recommendations:   · The cellulitic changes in the right lower extremity are markedly improved even from yesterday. · The patient is okay for discharge on oral cephalexin therapy to complete a 7 to 10-day course of therapy    Infection Control Recommendations:   Universal precautions    Discharge Planning:   Patient will need Midline Catheter Insertion/ PICC line Insertion: No  Patient willneed outpatient wound care: No    Medical Decision making / Summary of Stay:   Eduardo Lomax is a 71y.o.-year-old male who was initially admitted on 8/25/2020. Xu Thornton is a 69-year-old male with a history of essential hypertension, BPH, erectile dysfunction, hypothyroidism, hepatitis C virus infection, among other medical problems. He does have a history of her right foot osteomyelitis of the toe status post amputation. The patient reports that he woke up with pain and swelling in the right ankle and foot started 2 days ago. He did not report any associated fever, chills. No abdominal pain nausea vomiting. There was some reported slight cough but the patient does have a history of smoking and reports a chronic cough. The daughter was concerned for diabetic foot infection x-rays were done which were negative. There was also concern for DVT and venous Doppler was done which was negative. The patient was admitted and started on antimicrobial therapy for cellulitis and was seen by the podiatry service who did not feel the need for the invasive therapy was warranted at this time. The patient was started on clindamycin I was asked to evaluate and help with antibiotic choice.     Current evaluation:2020    /81   Pulse 51   Temp 97.5 °F (36.4 °C) (Oral)   Resp 18   Ht 5' 8\" (1.727 m)   Wt 187 lb 4.8 oz (85 kg)   SpO2 96%   BMI 28.48 kg/m²     Temperature Range: Temp: 97.5 °F (36.4 °C) Temp  Av °F (36.7 °C)  Min: 97.5 °F (36.4 °C)  Max: 98.7 °F (37.1 °C)  The patient is seen and evaluated at bedside he is awake and alert in no acute distress. He reports that the pain in his right lower extremity is markedly improved. He is able to move left and walk on the right lower extremity without any significant impedances as he did previously. No subjective fever or chills    Review of Systems   Constitutional: Negative. Respiratory: Negative. Cardiovascular: Negative. Gastrointestinal: Negative. Genitourinary: Negative. Musculoskeletal: Negative. Allergic/Immunologic: Negative. Neurological: Negative. Physical Examination :     Physical Exam  Constitutional:       Appearance: He is well-developed. HENT:      Head: Normocephalic and atraumatic. Neck:      Musculoskeletal: Normal range of motion and neck supple. Cardiovascular:      Rate and Rhythm: Normal rate. Heart sounds: Normal heart sounds. No friction rub. No gallop. Pulmonary:      Effort: Pulmonary effort is normal.      Breath sounds: Normal breath sounds. No wheezing. Abdominal:      General: Bowel sounds are normal.      Palpations: Abdomen is soft. There is no mass. Tenderness: There is no abdominal tenderness. Musculoskeletal: Normal range of motion. Lymphadenopathy:      Cervical: No cervical adenopathy. Skin:     General: Skin is dry. Comments: The cellulitic changes of the right lower extremity are markedly improved   Neurological:      Mental Status: He is alert and oriented to person, place, and time.          Laboratory data:   I have independently reviewed the followinglabs:  CBC with Differential:   Recent Labs     20  0900 20  0543 08/27/20  0841   WBC 5.5 4.1 3.7   HGB 11.2* 10.3* 10.9*   HCT 35.8* 33.0* 35.3*    256 292   LYMPHOPCT 16*  --   --    MONOPCT 10  --   --      BMP:   Recent Labs     08/26/20  0543 08/27/20  0841    139   K 3.9 3.8    104   CO2 24 23   BUN 17 15   CREATININE 1.56* 1.46*     Hepatic Function Panel: No results for input(s): PROT, LABALBU, BILIDIR, IBILI, BILITOT, ALKPHOS, ALT, AST in the last 72 hours. No results found for: PROCAL  Lab Results   Component Value Date    CRP 46.8 08/27/2020    CRP 52.3 08/25/2020    CRP 32.8 02/14/2020     Lab Results   Component Value Date    SEDRATE 58 (H) 08/25/2020         No results found for: DDIMER  Lab Results   Component Value Date    FERRITIN 70 03/01/2019    FERRITIN 36 04/15/2015     No results found for: LDH  No results found for: FIBRINOGEN    No results found for requested labs within last 30 days. No results found for: COVID19    No results for input(s): VANCOTROUGH in the last 72 hours.     Imaging Studies:   No new imaging    Cultures:     Wound Culture [[id number]]      Order Status: No result  Specimen: No Site Given from Skin     Wound Gram stain [[id number]]      Order Status: No result  Specimen: No Site Given from Wound           Medications:      ceFAZolin  1 g Intravenous Q8H    aspirin  81 mg Oral Daily    carvedilol  6.25 mg Oral BID WC    ferrous sulfate  325 mg Oral Daily    hydroCHLOROthiazide  25 mg Oral Daily    levothyroxine  175 mcg Oral Daily    lisinopril  5 mg Oral Daily    pantoprazole  40 mg Oral QAM AC    tamsulosin  0.4 mg Oral QPM    vitamin C  500 mg Oral Daily    vitamin D3  1 tablet Oral Daily    sodium chloride flush  10 mL Intravenous 2 times per day    heparin (porcine)  5,000 Units Subcutaneous 3 times per day           [facility]  Blake Garcia MD  Perfect Serve messaging  OFFICE: [phone]      Electronically signed by Blake Garcia MD on 8/27/2020 at 2:23 PM  Thank you for allowing us to participate in the care of this patient. Please call with questions. This note iscreated with the assistance of a speech recognition program.  While intending to generate a document that actually reflects the content of the visit, the document can still have some errors including those of syntax andsound a like substitutions which may escape proof reading. In such instances, actual meaning can be extrapolated by contextual diversion.

## 2020-08-27 NOTE — PROGRESS NOTES
Progress Note  Podiatric Medicine and Surgery     Subjective     CC: Right ankle pain and swelling    Patient seen and examined at bedside. Afebrile, slightly hypotensive  Patient states he has significant improvement in his right ankle. Complains of no pain and has noticed decreased swelling. HPI :  Kacy Vizcarra is a 71 y.o. male seen at [facility] ED for right ankle pain and swelling. Patient is a poor historian, most information is from chart review. The patient states his symptoms started approximately 2 days ago with redness and swelling and his ankle is painful while ambulating. He admits to a slight cough, but no phlegm or fever. He is not diabetic and denies any numbness or tingling in his lower extremities. He has been seen by Dr. Shahriar Martin in the past for a right 1st MTPJ arthrodesis and recently had his 4th digit amputated after dropping a table on the toe and developing OM. PCP is Chantelle Crocker MD    ROS: Denies N/V/F/C/SOB/CP. Otherwise negative except at stated in the HPI.      Medications:  Scheduled Meds:   ceFAZolin  1 g Intravenous Q8H    aspirin  81 mg Oral Daily    carvedilol  6.25 mg Oral BID WC    ferrous sulfate  325 mg Oral Daily    hydroCHLOROthiazide  25 mg Oral Daily    levothyroxine  175 mcg Oral Daily    lisinopril  5 mg Oral Daily    pantoprazole  40 mg Oral QAM AC    tamsulosin  0.4 mg Oral QPM    vitamin C  500 mg Oral Daily    vitamin D3  1 tablet Oral Daily    sodium chloride flush  10 mL Intravenous 2 times per day    heparin (porcine)  5,000 Units Subcutaneous 3 times per day       Continuous Infusions:   sodium chloride 75 mL/hr at 08/27/20 1015       PRN Meds:melatonin, sodium chloride flush, potassium chloride **OR** potassium alternative oral replacement **OR** potassium chloride, magnesium sulfate, acetaminophen **OR** acetaminophen, polyethylene glycol, promethazine **OR** ondansetron, nicotine, HYDROcodone 5 mg - acetaminophen **OR** HYDROcodone 5 mg - acetaminophen    Objective     Vitals:  Patient Vitals for the past 8 hrs:   BP Temp Temp src Pulse Resp SpO2 Weight   20 0655 -- -- -- -- -- -- 187 lb 4.8 oz (85 kg)   20 0631 100/81 97.5 °F (36.4 °C) Oral 51 18 96 % --     Average, Min, and Max for last 24 hours Vitals:  TEMPERATURE:  Temp  Av.8 °F (36.6 °C)  Min: 97.3 °F (36.3 °C)  Max: 98.7 °F (37.1 °C)    RESPIRATIONS RANGE: Resp  Av  Min: 15  Max: 18    PULSE RANGE: Pulse  Av  Min: 51  Max: 78    BLOOD PRESSURE RANGE:  Systolic (39SYX), LFB:848 , Min:100 , TGM:352   ; Diastolic (51FSM), JVJ:88, Min:64, Max:81      PULSE OXIMETRY RANGE: SpO2  Av %  Min: 92 %  Max: 97 %    I/O last 3 completed shifts: In: 1908.4 [I.V.:1908.4]  Out: 2200 [Urine:2200]    CBC:  Recent Labs     20  0900 20  0543 20  0841   WBC 5.5 4.1 3.7   HGB 11.2* 10.3* 10.9*   HCT 35.8* 33.0* 35.3*    256 292   CRP 52.3*  --   --         BMP:  Recent Labs     20  0900 20  0543 20  0841    139 139   K 3.6* 3.9 3.8    106 104   CO2 23 24 23   BUN 20 17 15   CREATININE 1.66* 1.56* 1.46*   GLUCOSE 104* 102* 143*   CALCIUM 8.3* 7.8* 8.5*        Coags:  No results for input(s): APTT, PROT, INR in the last 72 hours. Lab Results   Component Value Date    SEDRATE 58 (H) 2020     Recent Labs     20  0900   CRP 52.3*       Lower Extremity Physical Exam:  Vascular: DP pulse is palpable, PT pulse nonpalpable on the right. CFT <5 seconds to all digits. Hair growth is absent to the level of the digits. Mild non-pitting edema to the right ankle.       Neuro: Saph/sural/SP/DP/plantar sensation intact to light touch.     Musculoskeletal: Muscle strength is 4/5 secondary to pain and swelling. Gross deformity is present, s/p right 4th digit amputation. No pain on palpation to the medial and lateral ankle.     Dermatologic: No open lesions appreciated.   Minimal erythema noted with associated mild increase in warmth. Majority of erythema has subsided. Brauny changes to the foot and ankle secondary to venous stasis dermatitis. No fluctuance, crepitus, or induration. Interdigital maceration absent. Clinical Images:                      Imaging:   XR ANKLE RIGHT (MIN 3 VIEWS)   Final Result   No acute bony or joint space abnormality         XR FOOT RIGHT (MIN 3 VIEWS)   Final Result   No acute osseous abnormality of the right foot. No evidence of osteomyelitis. XR CHEST PORTABLE   Final Result   1. No active pulmonary disease. VL DUP LOWER EXTREMITY VENOUS RIGHT   Final Result             Assessment   Eda Homans Loredo Sr. is a 71 y.o. male with   1. Cellulitis right lower extremity-resolving  2. S/p right 4th digit amputation  3. Solitary kidney with CKD    Principal Problem:    Cellulitis  Active Problems:    Hepatitis C antibody positive in blood    Hypothyroidism    Benign prostatic hyperplasia without lower urinary tract symptoms    CKD (chronic kidney disease), stage III (HCC)    Amputation of toe of right foot ([facility]): 2/15/2020    Solitary kidney, congenital    Absence of toe (HCC)    Anemia due to stage 3 chronic kidney disease ([facility]    Essential hypertension    Gastro-esophageal reflux disease with esophagitis    Cellulitis of right leg  Resolved Problems:    * No resolved hospital problems. *       Plan     · Patient examined and evaluated at bedside   · Treatment options discussed in detail with the patient  · Reviewed x-rays and venous duplex- No ST emphysema  · Venous duplex: neg DVTs  · Medical management per primary  · Anbx: Ancef, ID consulted. Appreciate recs  · Patient is stable from podiatry standpoint for discharge pending ID recommendations for home antibiotics. No further surgical intervention necessary at this time. Patient to continue to monitor for any signs of infection or open lesions. We will follow-up with Dr. Bhavna Baca within 1 week of discharge.   Weightbearing as tolerated on bilateral lower extremities. · No dressing applied to RLE  · Weightbearing as tolerated to Right lower extremity.   · Discussed with Dr. Joseluis Beltran DPM   Podiatric Medicine & Surgery   8/27/2020 at 10:42 AM

## 2020-08-27 NOTE — DISCHARGE SUMMARY
[facility]  Office: [address], DO, Dea Hurtadoerty, DO, Nadia Lao, DO, No Nazanin Blood, DO, Leo Sanon MD, Rosalinda Law MD, Martha Weathers MD, Sherita Cabral MD, Jayna Puga MD, Alma Rosa Flowers MD, Maribel Gonzalez MD, Bird Christine MD, Joanna Meeks MD, Lilia Ba DO, Di Downs MD, Desmond Gil MD, Ricardo Payne DO, Bishop Jaycee MD,  Marissa Obrien, DO, Peg Pruitt MD, Jimbo Alejandro MD, Gloria Shepherd, [facility], [facility], CNP, Melissa Shukla, CNP, Manoj Pierce, CNS, Rommel Pedersen, CNP, Nancy Reyes, CNP, Han Hyatt, CNP, Adriane Cruz, CNP, Shante Eubanks, CNP, Ambrosio Awad PA-C, Nisha Crawford, [facility], Sonali Cxo, CNP, Naeem Hughes, CNP, Maxine Odom, CNP, Corey Herrera, [facility], Carson Gomez, [facility]    Discharge Summary     Patient ID: Ale Vitale  :  1950   MRN: [id number]     ACCOUNT:  [de-identified]   Patient's PCP: Beverley Rice MD  Admit Date: 2020   Discharge Date: 2020   Length of Stay: 2  Code Status:  Full Code  Admitting Physician: Maribel Gonzalez MD  Discharge Physician: MALU Bhagat  TARA     Active Discharge Diagnoses:     Hospital Problem Lists:  Principal Problem:    Cellulitis  Active Problems:    Hepatitis C antibody positive in blood    Hypothyroidism    Benign prostatic hyperplasia without lower urinary tract symptoms    CKD (chronic kidney disease), stage III (HCC)    Amputation of toe of right foot ([facility]): 2/15/2020    Solitary kidney, congenital    Absence of toe (HCC)    Anemia due to stage 3 chronic kidney disease ([facility]    Essential hypertension    Gastro-esophageal reflux disease with esophagitis    Cellulitis of right leg  Resolved Problems:    * No resolved hospital problems.  *      Admission Condition:  fair     Discharged Condition: good    Hospital Stay:     Hospital Course:  Ale Aceves is a 71 y.o. male who No evidence of osteomyelitis. Xr Chest Portable    Result Date: 8/25/2020  1. No active pulmonary disease. Consultations:    Consults:     Final Specialist Recommendations/Findings:   IP CONSULT TO HOSPITALIST  IP CONSULT TO PODIATRY  IP CONSULT TO INFECTIOUS DISEASES      The patient was seen and examined on day of discharge and this discharge summary is in conjunction with any daily progress note from day of discharge.     Discharge plan:     Disposition: Home    Physician Follow Up:     MD [address], [phone] [id number]  [address]  [phone]    In 1 week  follow up right ankle pain       Requiring Further Evaluation/Follow Up POST HOSPITALIZATION/Incidental Findings: N/A    Diet: regular diet    Activity: As tolerated    Instructions to Patient:   Keep all follow up appointments  Take all medications as directed  Finish antibiotics  Report to the emergency department if symptoms worsen    Discharge Medications:      Medication List      START taking these medications    cephALEXin 500 MG capsule  Commonly known as:  KEFLEX  Take 1 capsule by mouth 2 times daily for 10 days        CONTINUE taking these medications    aspirin 81 MG tablet     carvedilol 6.25 MG tablet  Commonly known as:  COREG     ferrous sulfate 325 (65 Fe) MG tablet  Commonly known as:  IRON 325     hydroCHLOROthiazide 25 MG tablet  Commonly known as:  HYDRODIURIL     levothyroxine 175 MCG tablet  Commonly known as:  SYNTHROID     lisinopril 5 MG tablet  Commonly known as:  PRINIVIL;ZESTRIL     melatonin 3 MG Tabs tablet  Take 1 tablet by mouth nightly as needed (insomnia)     MULTIVITAMIN ADULT PO     omeprazole 20 MG EC tablet     tamsulosin 0.4 MG capsule  Commonly known as:  FLOMAX     vitamin C 500 MG tablet  Commonly known as:  ASCORBIC ACID     vitamin D-3 125 MCG (5000 UT) Tabs  Commonly known as:  cholecalciferol     VITAMIN E PO STOP taking these medications    cefadroxil 500 MG capsule  Commonly known as:  DURICEF           Where to Get Your Medications      These medications were sent to [address] [place] [address]. [address]    Hours:  24-hours Phone:  [phone]   · cephALEXin 500 MG capsule         No discharge procedures on file. Time Spent on discharge is  45 mins in patient examination, evaluation, counseling as well as medication reconciliation, prescriptions for required medications, discharge plan and follow up. Electronically signed by   MALU Crawford CNP  8/27/2020  12:46 PM      Thank you Dr. Yana Yost MD for the opportunity to be involved in this patient's care.

## 2020-11-03 PROBLEM — E03.9 HYPOTHYROIDISM: Status: RESOLVED | Noted: 2020-11-03 | Resolved: 2020-11-03

## 2020-11-03 PROBLEM — I10 HYPERTENSION: Status: RESOLVED | Noted: 2020-11-03 | Resolved: 2020-11-03

## 2020-11-03 PROBLEM — I10 HYPERTENSIVE DISORDER: Status: RESOLVED | Noted: 2020-03-03 | Resolved: 2020-11-03

## 2020-11-16 ENCOUNTER — HOSPITAL ENCOUNTER (EMERGENCY)
Age: 70
Discharge: HOME | End: 2020-11-16
Attending: EMERGENCY MEDICINE
Payer: COMMERCIAL

## 2020-11-16 ENCOUNTER — APPOINTMENT (OUTPATIENT)
Dept: GENERAL RADIOLOGY | Age: 70
End: 2020-11-16
Payer: COMMERCIAL

## 2020-11-16 VITALS
BODY MASS INDEX: 29.03 KG/M2 | DIASTOLIC BLOOD PRESSURE: 91 MMHG | HEART RATE: 67 BPM | TEMPERATURE: 99.9 F | OXYGEN SATURATION: 100 % | HEIGHT: 67 IN | RESPIRATION RATE: 19 BRPM | WEIGHT: 185 LBS | SYSTOLIC BLOOD PRESSURE: 147 MMHG

## 2020-11-16 LAB
ABSOLUTE EOS #: 0.09 K/UL (ref 0–0.44)
ABSOLUTE IMMATURE GRANULOCYTE: 0 K/UL (ref 0–0.3)
ABSOLUTE LYMPH #: 0.53 K/UL (ref 1.1–3.7)
ABSOLUTE MONO #: 0.88 K/UL (ref 0.1–1.2)
ANION GAP SERPL CALCULATED.3IONS-SCNC: 9 MMOL/L (ref 9–17)
BASOPHILS # BLD: 0 % (ref 0–2)
BASOPHILS ABSOLUTE: 0 K/UL (ref 0–0.2)
BUN BLDV-MCNC: 24 MG/DL (ref 8–23)
BUN/CREAT BLD: 17 (ref 9–20)
C-REACTIVE PROTEIN: 91.1 MG/L (ref 0–5)
CALCIUM SERPL-MCNC: 8.9 MG/DL (ref 8.6–10.4)
CHLORIDE BLD-SCNC: 105 MMOL/L (ref 98–107)
CO2: 25 MMOL/L (ref 20–31)
CREAT SERPL-MCNC: 1.38 MG/DL (ref 0.7–1.2)
DIFFERENTIAL TYPE: ABNORMAL
EOSINOPHILS RELATIVE PERCENT: 1 % (ref 1–4)
GFR AFRICAN AMERICAN: >60 ML/MIN
GFR NON-AFRICAN AMERICAN: 51 ML/MIN
GFR SERPL CREATININE-BSD FRML MDRD: ABNORMAL ML/MIN/{1.73_M2}
GFR SERPL CREATININE-BSD FRML MDRD: ABNORMAL ML/MIN/{1.73_M2}
GLUCOSE BLD-MCNC: 116 MG/DL (ref 70–99)
HCT VFR BLD CALC: 38.3 % (ref 40.7–50.3)
HEMOGLOBIN: 11.8 G/DL (ref 13–17)
IMMATURE GRANULOCYTES: 0 %
LYMPHOCYTES # BLD: 6 % (ref 24–43)
MCH RBC QN AUTO: 26.9 PG (ref 25.2–33.5)
MCHC RBC AUTO-ENTMCNC: 30.8 G/DL (ref 28.4–34.8)
MCV RBC AUTO: 87.4 FL (ref 82.6–102.9)
MONOCYTES # BLD: 10 % (ref 3–12)
NRBC AUTOMATED: 0 PER 100 WBC
PDW BLD-RTO: 13.7 % (ref 11.8–14.4)
PLATELET # BLD: 259 K/UL (ref 138–453)
PLATELET ESTIMATE: ABNORMAL
PMV BLD AUTO: 10.4 FL (ref 8.1–13.5)
POTASSIUM SERPL-SCNC: 3.7 MMOL/L (ref 3.7–5.3)
RBC # BLD: 4.38 M/UL (ref 4.21–5.77)
RBC # BLD: ABNORMAL 10*6/UL
SEDIMENTATION RATE, ERYTHROCYTE: 58 MM (ref 0–20)
SEG NEUTROPHILS: 83 % (ref 36–65)
SEGMENTED NEUTROPHILS ABSOLUTE COUNT: 7.3 K/UL (ref 1.5–8.1)
SODIUM BLD-SCNC: 139 MMOL/L (ref 135–144)
WBC # BLD: 8.8 K/UL (ref 3.5–11.3)
WBC # BLD: ABNORMAL 10*3/UL

## 2020-11-16 PROCEDURE — 86140 C-REACTIVE PROTEIN: CPT

## 2020-11-16 PROCEDURE — 80048 BASIC METABOLIC PNL TOTAL CA: CPT

## 2020-11-16 PROCEDURE — 93971 EXTREMITY STUDY: CPT

## 2020-11-16 PROCEDURE — 71045 X-RAY EXAM CHEST 1 VIEW: CPT

## 2020-11-16 PROCEDURE — 85025 COMPLETE CBC W/AUTO DIFF WBC: CPT

## 2020-11-16 PROCEDURE — 85652 RBC SED RATE AUTOMATED: CPT

## 2020-11-16 PROCEDURE — 99283 EMERGENCY DEPT VISIT LOW MDM: CPT

## 2020-11-16 RX ORDER — HYDROCODONE BITARTRATE AND ACETAMINOPHEN 5; 325 MG/1; MG/1
1 TABLET ORAL EVERY 6 HOURS PRN
Qty: 8 TABLET | Refills: 0 | Status: SHIPPED | OUTPATIENT
Start: 2020-11-16 | End: 2020-11-18

## 2020-11-16 RX ORDER — PREDNISONE 20 MG/1
20 TABLET ORAL DAILY
Qty: 15 TABLET | Refills: 0 | Status: SHIPPED | OUTPATIENT
Start: 2020-11-16 | End: 2020-11-23

## 2020-11-16 ASSESSMENT — ENCOUNTER SYMPTOMS
DIARRHEA: 0
SHORTNESS OF BREATH: 0
ABDOMINAL PAIN: 0
SORE THROAT: 0
NAUSEA: 0
SINUS PRESSURE: 0
RHINORRHEA: 0
COUGH: 0
VOMITING: 0
CONSTIPATION: 0
WHEEZING: 0
COLOR CHANGE: 0
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
## 2021-05-05 ENCOUNTER — HOSPITAL ENCOUNTER (OUTPATIENT)
Dept: VASCULAR LAB | Age: 71
Discharge: HOME | End: 2021-05-05
Payer: COMMERCIAL

## 2021-05-05 DIAGNOSIS — L03.119 CELLULITIS OF LOWER EXTREMITY, UNSPECIFIED LATERALITY: Primary | ICD-10-CM

## 2021-05-05 DIAGNOSIS — I65.23 BILATERAL CAROTID ARTERY STENOSIS: ICD-10-CM

## 2021-05-05 PROCEDURE — 93880 EXTRACRANIAL BILAT STUDY: CPT

## 2021-05-05 PROCEDURE — 93970 EXTREMITY STUDY: CPT

## 2021-09-25 ENCOUNTER — HOSPITAL ENCOUNTER (EMERGENCY)
Age: 71
Discharge: LEFT BEFORE DISCHARGE | End: 2021-09-25
Payer: COMMERCIAL

## 2021-09-25 VITALS
DIASTOLIC BLOOD PRESSURE: 85 MMHG | OXYGEN SATURATION: 98 % | WEIGHT: 172 LBS | SYSTOLIC BLOOD PRESSURE: 121 MMHG | HEIGHT: 68 IN | RESPIRATION RATE: 20 BRPM | HEART RATE: 62 BPM | TEMPERATURE: 98 F | BODY MASS INDEX: 26.07 KG/M2

## 2021-11-04 NOTE — PROGRESS NOTES
Learning About the Safe Use of Antibiotics  Introduction  Antibiotics are drugs used to kill bacteria. Bacteria can cause infections. These include strep throat, ear infections, and pneumonia. These medicines can't cure everything. They don't kill viruses or help with allergies. They don't help illnesses such as the common cold, the flu, or a runny nose. And they can cause side effects. There are many types of antibiotics. Your doctor will decide which one will work best for your infection. Examples include:  · Amoxicillin. · Cephalexin (Keflex). · Ciprofloxacin (Cipro). What are the possible side effects? Side effects can include:  · Nausea. · Diarrhea. · Skin rash. · Yeast infection. · A severe allergic reaction. It may cause itching, swelling, and breathing problems. This is rare. You may have other side effects or reactions not listed here. Check the information that comes with your medicine. Should you take antibiotics just in case? Don't take antibiotics when you don't need them. If you do that, they may not work when you do need them. Each time you take antibiotics, you are more likely to have some bacteria that survive and aren't killed by the medicine. Bacteria that don't die can change and become even harder to kill. These are called antibiotic-resistant bacteria. They can cause longer and more serious infections. To treat them, you may need different, stronger antibiotics that have more side effects and may cost more. So always ask your doctor if antibiotics are the best treatment. Explain that you do not want antibiotics unless you need them. Help protect the community  Using antibiotics when they're not needed leads to the development of antibiotic-resistant bacteria. These tougher bacteria can spread to family members, children, and coworkers. People in your community will have a risk of getting an infection that is harder to cure and that costs more to treat.   How can you take effects of the drug and do not get any benefit from it. Moreover, taking an antibiotic when it is not needed can lead to the development of antibiotic resistance. When resistance develops, antibiotics may not be able to stop future infections. Every time someone takes an antibiotic they dont need, they increase their risk of developing a resistant infection in the future. Types of Adverse Drug Events Related to Antibiotics  Allergic Reactions  Every year, there are more than 140,000 emergency department visits for reactions to antibiotics. Almost four out of five (79%) emergency department visits for antibiotic-related adverse drug events are due to an allergic reaction. These reactions can range from mild rashes and itching to serious blistering skin reactions swelling of the face and throat, and breathing problems. Minimizing unnecessary antibiotic use is the best way to reduce the risk of adverse drug events from antibiotics. Patients should tell their doctors about any past drug reactions or allergies. C. difficile  C. difficile causes diarrhea linked to at least 14,000 [place] deaths each year. When a person takes antibiotics, good bacteria that protect against infection are destroyed for several months. During this time, patients can get sick from C. difficile picked up from contaminated surfaces or spread from a healthcare providers hands. Those most at risk are people, especially older adults, who take antibiotics and also get medical care. Take antibiotics exactly and only as prescribed. Drug Interactions and Side Effects  Antibiotics can interact with other drugs patients take, making those drugs or the antibiotics less effective. Some drug combinations can worsen the side effects of the antibiotic or other drug. Common side effects of antibiotics include nausea, diarrhea, and stomach pain. Sometimes these symptoms can lead to dehydration and other problems.  Patients should ask their doctors 04-Nov-2021 01:22

## 2022-07-20 ENCOUNTER — HOSPITAL ENCOUNTER (OUTPATIENT)
Age: 72
Setting detail: SPECIMEN
Discharge: HOME | End: 2022-07-20

## 2022-07-20 LAB
ABSOLUTE EOS #: 0.5 K/UL (ref 0–0.44)
ABSOLUTE IMMATURE GRANULOCYTE: 0.29 K/UL (ref 0–0.3)
ABSOLUTE LYMPH #: 0.82 K/UL (ref 1.1–3.7)
ABSOLUTE MONO #: 1 K/UL (ref 0.1–1.2)
ANION GAP SERPL CALCULATED.3IONS-SCNC: 9 MMOL/L (ref 9–17)
BASOPHILS # BLD: 1 % (ref 0–2)
BASOPHILS ABSOLUTE: 0.1 K/UL (ref 0–0.2)
BUN BLDV-MCNC: 34 MG/DL (ref 8–23)
BUN/CREAT BLD: 17 (ref 9–20)
CALCIUM SERPL-MCNC: 8.3 MG/DL (ref 8.6–10.4)
CHLORIDE BLD-SCNC: 94 MMOL/L (ref 98–107)
CO2: 31 MMOL/L (ref 20–31)
CREAT SERPL-MCNC: 1.97 MG/DL (ref 0.7–1.2)
EOSINOPHILS RELATIVE PERCENT: 3 % (ref 1–4)
GFR AFRICAN AMERICAN: 41 ML/MIN
GFR NON-AFRICAN AMERICAN: 34 ML/MIN
GFR SERPL CREATININE-BSD FRML MDRD: ABNORMAL ML/MIN/{1.73_M2}
GLUCOSE BLD-MCNC: 117 MG/DL (ref 70–99)
HCT VFR BLD CALC: 36.4 % (ref 40.7–50.3)
HEMOGLOBIN: 10.9 G/DL (ref 13–17)
IMMATURE GRANULOCYTES: 2 %
LYMPHOCYTES # BLD: 5 % (ref 24–43)
MCH RBC QN AUTO: 27.3 PG (ref 25.2–33.5)
MCHC RBC AUTO-ENTMCNC: 29.9 G/DL (ref 28.4–34.8)
MCV RBC AUTO: 91 FL (ref 82.6–102.9)
MONOCYTES # BLD: 6 % (ref 3–12)
NRBC AUTOMATED: 0 PER 100 WBC
PDW BLD-RTO: 13.6 % (ref 11.8–14.4)
PLATELET # BLD: 425 K/UL (ref 138–453)
PMV BLD AUTO: 10.7 FL (ref 8.1–13.5)
POTASSIUM SERPL-SCNC: 3.7 MMOL/L (ref 3.7–5.3)
RBC # BLD: 4 M/UL (ref 4.21–5.77)
SEG NEUTROPHILS: 83 % (ref 36–65)
SEGMENTED NEUTROPHILS ABSOLUTE COUNT: 14.99 K/UL (ref 1.5–8.1)
SODIUM BLD-SCNC: 134 MMOL/L (ref 135–144)
WBC # BLD: 17.7 K/UL (ref 3.5–11.3)

## 2022-07-20 PROCEDURE — 85025 COMPLETE CBC W/AUTO DIFF WBC: CPT

## 2022-07-20 PROCEDURE — 36415 COLL VENOUS BLD VENIPUNCTURE: CPT

## 2022-07-20 PROCEDURE — P9603 ONE-WAY ALLOW PRORATED MILES: HCPCS

## 2022-07-20 PROCEDURE — 80048 BASIC METABOLIC PNL TOTAL CA: CPT

## 2022-07-25 ENCOUNTER — APPOINTMENT (OUTPATIENT)
Dept: GENERAL RADIOLOGY | Age: 72
DRG: 194 | End: 2022-07-25
Payer: COMMERCIAL

## 2022-07-25 ENCOUNTER — HOSPITAL ENCOUNTER (OUTPATIENT)
Age: 72
Setting detail: SPECIMEN
Discharge: HOME | DRG: 194 | End: 2022-07-25
Payer: COMMERCIAL

## 2022-07-25 ENCOUNTER — HOSPITAL ENCOUNTER (INPATIENT)
Age: 72
LOS: 3 days | Discharge: NURSING, REHAB OR LONG-TERM CARE | DRG: 194 | End: 2022-07-28
Attending: EMERGENCY MEDICINE | Admitting: FAMILY MEDICINE
Payer: COMMERCIAL

## 2022-07-25 DIAGNOSIS — M19.90 ARTHRITIS: ICD-10-CM

## 2022-07-25 DIAGNOSIS — M79.2 NEUROPATHIC PAIN: ICD-10-CM

## 2022-07-25 DIAGNOSIS — J44.1 COPD EXACERBATION (HCC): Primary | ICD-10-CM

## 2022-07-25 DIAGNOSIS — J18.9 PNEUMONIA OF BOTH LOWER LOBES DUE TO INFECTIOUS ORGANISM: ICD-10-CM

## 2022-07-25 LAB
ABSOLUTE EOS #: 1.16 K/UL (ref 0–0.4)
ABSOLUTE IMMATURE GRANULOCYTE: 0 K/UL (ref 0–0.3)
ABSOLUTE LYMPH #: 0.95 K/UL (ref 1–4.8)
ABSOLUTE MONO #: 0.32 K/UL (ref 0.2–0.8)
ALBUMIN SERPL-MCNC: 3.5 G/DL (ref 3.5–5.2)
ALP BLD-CCNC: 137 U/L (ref 40–129)
ALT SERPL-CCNC: 19 U/L (ref 5–41)
ANION GAP SERPL CALCULATED.3IONS-SCNC: 9 MMOL/L (ref 9–17)
AST SERPL-CCNC: 14 U/L
BASOPHILS # BLD: 1 %
BASOPHILS ABSOLUTE: 0.11 K/UL (ref 0–0.2)
BILIRUB SERPL-MCNC: 0.25 MG/DL (ref 0.3–1.2)
BUN BLDV-MCNC: 39 MG/DL (ref 8–23)
BUN/CREAT BLD: 26 (ref 9–20)
CALCIUM SERPL-MCNC: 8.9 MG/DL (ref 8.6–10.4)
CHLORIDE BLD-SCNC: 101 MMOL/L (ref 98–107)
CO2: 28 MMOL/L (ref 20–31)
CREAT SERPL-MCNC: 1.52 MG/DL (ref 0.7–1.2)
EOSINOPHILS RELATIVE PERCENT: 11 % (ref 1–4)
GFR AFRICAN AMERICAN: 55 ML/MIN
GFR NON-AFRICAN AMERICAN: 45 ML/MIN
GFR SERPL CREATININE-BSD FRML MDRD: ABNORMAL ML/MIN/{1.73_M2}
GLUCOSE BLD-MCNC: 143 MG/DL (ref 70–99)
HCT VFR BLD CALC: 35.9 % (ref 40.7–50.3)
HEMOGLOBIN: 11 G/DL (ref 13–17)
IMMATURE GRANULOCYTES: 0 %
KEPPRA: 33 UG/ML
LYMPHOCYTES # BLD: 9 % (ref 24–44)
MCH RBC QN AUTO: 27.3 PG (ref 25.2–33.5)
MCHC RBC AUTO-ENTMCNC: 30.6 G/DL (ref 28.4–34.8)
MCV RBC AUTO: 89.1 FL (ref 82.6–102.9)
MONOCYTES # BLD: 3 % (ref 1–7)
NRBC AUTOMATED: 0 PER 100 WBC
PDW BLD-RTO: 13.5 % (ref 11.8–14.4)
PLATELET # BLD: 532 K/UL (ref 138–453)
PMV BLD AUTO: 9.7 FL (ref 8.1–13.5)
POTASSIUM SERPL-SCNC: 5 MMOL/L (ref 3.7–5.3)
PRO-BNP: 156 PG/ML
RBC # BLD: 4.03 M/UL (ref 4.21–5.77)
SEG NEUTROPHILS: 76 % (ref 36–66)
SEGMENTED NEUTROPHILS ABSOLUTE COUNT: 7.96 K/UL (ref 1.8–7.7)
SODIUM BLD-SCNC: 138 MMOL/L (ref 135–144)
THYROXINE, FREE: 1.46 NG/DL (ref 0.93–1.7)
TOTAL PROTEIN: 7.4 G/DL (ref 6.4–8.3)
TROPONIN, HIGH SENSITIVITY: 18 NG/L (ref 0–22)
TSH SERPL DL<=0.05 MIU/L-ACNC: 0.35 UIU/ML (ref 0.3–5)
WBC # BLD: 10.5 K/UL (ref 3.5–11.3)

## 2022-07-25 PROCEDURE — 71045 X-RAY EXAM CHEST 1 VIEW: CPT

## 2022-07-25 PROCEDURE — 83880 ASSAY OF NATRIURETIC PEPTIDE: CPT

## 2022-07-25 PROCEDURE — 84484 ASSAY OF TROPONIN QUANT: CPT

## 2022-07-25 PROCEDURE — 36415 COLL VENOUS BLD VENIPUNCTURE: CPT

## 2022-07-25 PROCEDURE — 1200000000 HC SEMI PRIVATE

## 2022-07-25 PROCEDURE — 80177 DRUG SCRN QUAN LEVETIRACETAM: CPT

## 2022-07-25 PROCEDURE — 84439 ASSAY OF FREE THYROXINE: CPT

## 2022-07-25 PROCEDURE — 80053 COMPREHEN METABOLIC PANEL: CPT

## 2022-07-25 PROCEDURE — P9603 ONE-WAY ALLOW PRORATED MILES: HCPCS

## 2022-07-25 PROCEDURE — 99285 EMERGENCY DEPT VISIT HI MDM: CPT

## 2022-07-25 PROCEDURE — 93005 ELECTROCARDIOGRAM TRACING: CPT | Performed by: EMERGENCY MEDICINE

## 2022-07-25 PROCEDURE — 85025 COMPLETE CBC W/AUTO DIFF WBC: CPT

## 2022-07-25 PROCEDURE — 84443 ASSAY THYROID STIM HORMONE: CPT

## 2022-07-25 RX ORDER — MAGNESIUM SULFATE IN WATER 40 MG/ML
2000 INJECTION, SOLUTION INTRAVENOUS ONCE
Status: COMPLETED | OUTPATIENT
Start: 2022-07-25 | End: 2022-07-26

## 2022-07-25 RX ORDER — IPRATROPIUM BROMIDE AND ALBUTEROL SULFATE 2.5; .5 MG/3ML; MG/3ML
1 SOLUTION RESPIRATORY (INHALATION) ONCE
Status: COMPLETED | OUTPATIENT
Start: 2022-07-25 | End: 2022-07-26

## 2022-07-25 RX ORDER — METHYLPREDNISOLONE SODIUM SUCCINATE 125 MG/2ML
125 INJECTION, POWDER, LYOPHILIZED, FOR SOLUTION INTRAMUSCULAR; INTRAVENOUS ONCE
Status: COMPLETED | OUTPATIENT
Start: 2022-07-25 | End: 2022-07-26

## 2022-07-25 RX ORDER — MORPHINE SULFATE 4 MG/ML
4 INJECTION, SOLUTION INTRAMUSCULAR; INTRAVENOUS ONCE
Status: COMPLETED | OUTPATIENT
Start: 2022-07-25 | End: 2022-07-26

## 2022-07-26 ENCOUNTER — APPOINTMENT (OUTPATIENT)
Dept: GENERAL RADIOLOGY | Age: 72
DRG: 194 | End: 2022-07-26
Payer: COMMERCIAL

## 2022-07-26 PROBLEM — J44.1 COPD EXACERBATION (HCC): Status: ACTIVE | Noted: 2022-07-26

## 2022-07-26 LAB
ANION GAP SERPL CALCULATED.3IONS-SCNC: 7 MMOL/L (ref 9–17)
BUN BLDV-MCNC: 37 MG/DL (ref 8–23)
BUN/CREAT BLD: 24 (ref 9–20)
CALCIUM SERPL-MCNC: 8.8 MG/DL (ref 8.6–10.4)
CHLORIDE BLD-SCNC: 101 MMOL/L (ref 98–107)
CO2: 27 MMOL/L (ref 20–31)
CREAT SERPL-MCNC: 1.55 MG/DL (ref 0.7–1.2)
GFR AFRICAN AMERICAN: 54 ML/MIN
GFR NON-AFRICAN AMERICAN: 44 ML/MIN
GFR SERPL CREATININE-BSD FRML MDRD: ABNORMAL ML/MIN/{1.73_M2}
GLUCOSE BLD-MCNC: 158 MG/DL (ref 70–99)
LACTIC ACID, SEPSIS: 1.2 MMOL/L (ref 0.5–1.9)
LACTIC ACID, SEPSIS: 1.3 MMOL/L (ref 0.5–1.9)
POTASSIUM SERPL-SCNC: 5.8 MMOL/L (ref 3.7–5.3)
PROCALCITONIN: 0.06 NG/ML
SARS-COV-2, RAPID: NOT DETECTED
SODIUM BLD-SCNC: 135 MMOL/L (ref 135–144)
SPECIMEN DESCRIPTION: NORMAL
TROPONIN, HIGH SENSITIVITY: 19 NG/L (ref 0–22)

## 2022-07-26 PROCEDURE — 6370000000 HC RX 637 (ALT 250 FOR IP): Performed by: NURSE PRACTITIONER

## 2022-07-26 PROCEDURE — 87635 SARS-COV-2 COVID-19 AMP PRB: CPT

## 2022-07-26 PROCEDURE — 99222 1ST HOSP IP/OBS MODERATE 55: CPT | Performed by: STUDENT IN AN ORGANIZED HEALTH CARE EDUCATION/TRAINING PROGRAM

## 2022-07-26 PROCEDURE — 80048 BASIC METABOLIC PNL TOTAL CA: CPT

## 2022-07-26 PROCEDURE — 2580000003 HC RX 258: Performed by: EMERGENCY MEDICINE

## 2022-07-26 PROCEDURE — 71045 X-RAY EXAM CHEST 1 VIEW: CPT

## 2022-07-26 PROCEDURE — 2580000003 HC RX 258: Performed by: NURSE PRACTITIONER

## 2022-07-26 PROCEDURE — 94761 N-INVAS EAR/PLS OXIMETRY MLT: CPT

## 2022-07-26 PROCEDURE — 97166 OT EVAL MOD COMPLEX 45 MIN: CPT

## 2022-07-26 PROCEDURE — 6360000002 HC RX W HCPCS: Performed by: NURSE PRACTITIONER

## 2022-07-26 PROCEDURE — 84484 ASSAY OF TROPONIN QUANT: CPT

## 2022-07-26 PROCEDURE — 97535 SELF CARE MNGMENT TRAINING: CPT

## 2022-07-26 PROCEDURE — 83605 ASSAY OF LACTIC ACID: CPT

## 2022-07-26 PROCEDURE — 2700000000 HC OXYGEN THERAPY PER DAY

## 2022-07-26 PROCEDURE — 84145 PROCALCITONIN (PCT): CPT

## 2022-07-26 PROCEDURE — APPSS30 APP SPLIT SHARED TIME 16-30 MINUTES: Performed by: NURSE PRACTITIONER

## 2022-07-26 PROCEDURE — 87040 BLOOD CULTURE FOR BACTERIA: CPT

## 2022-07-26 PROCEDURE — 1200000000 HC SEMI PRIVATE

## 2022-07-26 PROCEDURE — 36415 COLL VENOUS BLD VENIPUNCTURE: CPT

## 2022-07-26 PROCEDURE — 97530 THERAPEUTIC ACTIVITIES: CPT

## 2022-07-26 PROCEDURE — 97162 PT EVAL MOD COMPLEX 30 MIN: CPT

## 2022-07-26 PROCEDURE — 6370000000 HC RX 637 (ALT 250 FOR IP): Performed by: EMERGENCY MEDICINE

## 2022-07-26 PROCEDURE — 6360000002 HC RX W HCPCS: Performed by: EMERGENCY MEDICINE

## 2022-07-26 PROCEDURE — 94640 AIRWAY INHALATION TREATMENT: CPT

## 2022-07-26 RX ORDER — METHYLPREDNISOLONE SODIUM SUCCINATE 40 MG/ML
40 INJECTION, POWDER, LYOPHILIZED, FOR SOLUTION INTRAMUSCULAR; INTRAVENOUS EVERY 6 HOURS
Status: DISCONTINUED | OUTPATIENT
Start: 2022-07-26 | End: 2022-07-26

## 2022-07-26 RX ORDER — PANTOPRAZOLE SODIUM 40 MG/1
40 TABLET, DELAYED RELEASE ORAL
Status: DISCONTINUED | OUTPATIENT
Start: 2022-07-26 | End: 2022-07-28 | Stop reason: HOSPADM

## 2022-07-26 RX ORDER — LISINOPRIL 20 MG/1
20 TABLET ORAL DAILY
Status: DISCONTINUED | OUTPATIENT
Start: 2022-07-26 | End: 2022-07-28 | Stop reason: HOSPADM

## 2022-07-26 RX ORDER — ONDANSETRON 2 MG/ML
4 INJECTION INTRAMUSCULAR; INTRAVENOUS EVERY 6 HOURS PRN
Status: DISCONTINUED | OUTPATIENT
Start: 2022-07-26 | End: 2022-07-28 | Stop reason: HOSPADM

## 2022-07-26 RX ORDER — HYDROCHLOROTHIAZIDE 25 MG/1
25 TABLET ORAL DAILY
Status: DISCONTINUED | OUTPATIENT
Start: 2022-07-26 | End: 2022-07-26

## 2022-07-26 RX ORDER — PREDNISONE 20 MG/1
40 TABLET ORAL DAILY
Status: DISCONTINUED | OUTPATIENT
Start: 2022-07-28 | End: 2022-07-26

## 2022-07-26 RX ORDER — LEVOTHYROXINE SODIUM 175 UG/1
175 TABLET ORAL DAILY
Status: DISCONTINUED | OUTPATIENT
Start: 2022-07-27 | End: 2022-07-28 | Stop reason: HOSPADM

## 2022-07-26 RX ORDER — SODIUM CHLORIDE 9 MG/ML
INJECTION, SOLUTION INTRAVENOUS PRN
Status: DISCONTINUED | OUTPATIENT
Start: 2022-07-26 | End: 2022-07-28 | Stop reason: HOSPADM

## 2022-07-26 RX ORDER — GABAPENTIN 300 MG/1
300 CAPSULE ORAL EVERY 8 HOURS PRN
Status: ON HOLD | COMMUNITY
End: 2022-07-28 | Stop reason: SDUPTHER

## 2022-07-26 RX ORDER — TAMSULOSIN HYDROCHLORIDE 0.4 MG/1
0.4 CAPSULE ORAL NIGHTLY
Status: DISCONTINUED | OUTPATIENT
Start: 2022-07-26 | End: 2022-07-28 | Stop reason: HOSPADM

## 2022-07-26 RX ORDER — LEVETIRACETAM 500 MG/1
500 TABLET ORAL 2 TIMES DAILY
COMMUNITY

## 2022-07-26 RX ORDER — ACETAMINOPHEN 325 MG/1
650 TABLET ORAL EVERY 6 HOURS PRN
Status: DISCONTINUED | OUTPATIENT
Start: 2022-07-26 | End: 2022-07-28 | Stop reason: HOSPADM

## 2022-07-26 RX ORDER — DOXYCYCLINE HYCLATE 100 MG
100 TABLET ORAL EVERY 12 HOURS
Status: DISCONTINUED | OUTPATIENT
Start: 2022-07-26 | End: 2022-07-26 | Stop reason: CLARIF

## 2022-07-26 RX ORDER — IPRATROPIUM BROMIDE AND ALBUTEROL SULFATE 2.5; .5 MG/3ML; MG/3ML
1 SOLUTION RESPIRATORY (INHALATION) 4 TIMES DAILY
Status: DISCONTINUED | OUTPATIENT
Start: 2022-07-26 | End: 2022-07-26

## 2022-07-26 RX ORDER — OXYCODONE HYDROCHLORIDE 5 MG/1
5 TABLET ORAL EVERY 8 HOURS PRN
Status: ON HOLD | COMMUNITY
End: 2022-07-28 | Stop reason: SDUPTHER

## 2022-07-26 RX ORDER — PANTOPRAZOLE SODIUM 40 MG/1
40 TABLET, DELAYED RELEASE ORAL DAILY
COMMUNITY

## 2022-07-26 RX ORDER — CYCLOBENZAPRINE HCL 10 MG
10 TABLET ORAL EVERY 8 HOURS PRN
COMMUNITY

## 2022-07-26 RX ORDER — SODIUM CHLORIDE 9 MG/ML
INJECTION, SOLUTION INTRAVENOUS CONTINUOUS
Status: DISCONTINUED | OUTPATIENT
Start: 2022-07-26 | End: 2022-07-27

## 2022-07-26 RX ORDER — CARVEDILOL 3.12 MG/1
6.25 TABLET ORAL 2 TIMES DAILY WITH MEALS
Status: DISCONTINUED | OUTPATIENT
Start: 2022-07-26 | End: 2022-07-28 | Stop reason: HOSPADM

## 2022-07-26 RX ORDER — PREDNISONE 20 MG/1
40 TABLET ORAL DAILY
Status: DISCONTINUED | OUTPATIENT
Start: 2022-07-27 | End: 2022-07-28 | Stop reason: HOSPADM

## 2022-07-26 RX ORDER — SODIUM CHLORIDE 0.9 % (FLUSH) 0.9 %
5-40 SYRINGE (ML) INJECTION EVERY 12 HOURS SCHEDULED
Status: DISCONTINUED | OUTPATIENT
Start: 2022-07-26 | End: 2022-07-28 | Stop reason: HOSPADM

## 2022-07-26 RX ORDER — POLYETHYLENE GLYCOL 3350 17 G/17G
17 POWDER, FOR SOLUTION ORAL DAILY PRN
Status: DISCONTINUED | OUTPATIENT
Start: 2022-07-26 | End: 2022-07-28 | Stop reason: HOSPADM

## 2022-07-26 RX ORDER — MORPHINE SULFATE 2 MG/ML
2 INJECTION, SOLUTION INTRAMUSCULAR; INTRAVENOUS
Status: DISCONTINUED | OUTPATIENT
Start: 2022-07-26 | End: 2022-07-28 | Stop reason: HOSPADM

## 2022-07-26 RX ORDER — ACETAMINOPHEN 650 MG/1
650 SUPPOSITORY RECTAL EVERY 6 HOURS PRN
Status: DISCONTINUED | OUTPATIENT
Start: 2022-07-26 | End: 2022-07-28 | Stop reason: HOSPADM

## 2022-07-26 RX ORDER — IPRATROPIUM BROMIDE AND ALBUTEROL SULFATE 2.5; .5 MG/3ML; MG/3ML
1 SOLUTION RESPIRATORY (INHALATION)
Status: DISCONTINUED | OUTPATIENT
Start: 2022-07-26 | End: 2022-07-26

## 2022-07-26 RX ORDER — ENOXAPARIN SODIUM 100 MG/ML
40 INJECTION SUBCUTANEOUS DAILY
Status: DISCONTINUED | OUTPATIENT
Start: 2022-07-26 | End: 2022-07-28 | Stop reason: HOSPADM

## 2022-07-26 RX ORDER — SODIUM CHLORIDE 0.9 % (FLUSH) 0.9 %
5-40 SYRINGE (ML) INJECTION PRN
Status: DISCONTINUED | OUTPATIENT
Start: 2022-07-26 | End: 2022-07-28 | Stop reason: HOSPADM

## 2022-07-26 RX ORDER — LANOLIN ALCOHOL/MO/W.PET/CERES
325 CREAM (GRAM) TOPICAL DAILY
Status: DISCONTINUED | OUTPATIENT
Start: 2022-07-26 | End: 2022-07-28 | Stop reason: HOSPADM

## 2022-07-26 RX ORDER — ASPIRIN 81 MG/1
81 TABLET ORAL DAILY
Status: DISCONTINUED | OUTPATIENT
Start: 2022-07-26 | End: 2022-07-28 | Stop reason: HOSPADM

## 2022-07-26 RX ORDER — CEFUROXIME AXETIL 500 MG/1
500 TABLET ORAL 2 TIMES DAILY
Status: ON HOLD | COMMUNITY
End: 2022-07-28 | Stop reason: HOSPADM

## 2022-07-26 RX ORDER — DOXYCYCLINE 100 MG/1
100 CAPSULE ORAL EVERY 12 HOURS SCHEDULED
Status: DISCONTINUED | OUTPATIENT
Start: 2022-07-26 | End: 2022-07-28 | Stop reason: HOSPADM

## 2022-07-26 RX ORDER — ALBUTEROL SULFATE 90 UG/1
2 AEROSOL, METERED RESPIRATORY (INHALATION) EVERY 6 HOURS PRN
COMMUNITY

## 2022-07-26 RX ORDER — ONDANSETRON 4 MG/1
4 TABLET, ORALLY DISINTEGRATING ORAL EVERY 8 HOURS PRN
Status: DISCONTINUED | OUTPATIENT
Start: 2022-07-26 | End: 2022-07-28 | Stop reason: HOSPADM

## 2022-07-26 RX ORDER — LEVETIRACETAM 500 MG/1
500 TABLET ORAL 2 TIMES DAILY
Status: DISCONTINUED | OUTPATIENT
Start: 2022-07-26 | End: 2022-07-28 | Stop reason: HOSPADM

## 2022-07-26 RX ORDER — ALBUTEROL SULFATE 2.5 MG/3ML
2.5 SOLUTION RESPIRATORY (INHALATION)
Status: DISCONTINUED | OUTPATIENT
Start: 2022-07-26 | End: 2022-07-28 | Stop reason: HOSPADM

## 2022-07-26 RX ORDER — MORPHINE SULFATE 4 MG/ML
4 INJECTION, SOLUTION INTRAMUSCULAR; INTRAVENOUS
Status: DISCONTINUED | OUTPATIENT
Start: 2022-07-26 | End: 2022-07-28 | Stop reason: HOSPADM

## 2022-07-26 RX ORDER — BUDESONIDE AND FORMOTEROL FUMARATE DIHYDRATE 160; 4.5 UG/1; UG/1
2 AEROSOL RESPIRATORY (INHALATION) 2 TIMES DAILY
COMMUNITY

## 2022-07-26 RX ORDER — DIPHENHYDRAMINE HCL 25 MG
25 TABLET ORAL NIGHTLY PRN
Status: DISCONTINUED | OUTPATIENT
Start: 2022-07-26 | End: 2022-07-28 | Stop reason: HOSPADM

## 2022-07-26 RX ORDER — IPRATROPIUM BROMIDE AND ALBUTEROL SULFATE 2.5; .5 MG/3ML; MG/3ML
1 SOLUTION RESPIRATORY (INHALATION) 3 TIMES DAILY
Status: DISCONTINUED | OUTPATIENT
Start: 2022-07-26 | End: 2022-07-27

## 2022-07-26 RX ORDER — LIDOCAINE 50 MG/G
1 PATCH TOPICAL DAILY
COMMUNITY

## 2022-07-26 RX ORDER — ASCORBIC ACID 500 MG
500 TABLET ORAL DAILY
Status: DISCONTINUED | OUTPATIENT
Start: 2022-07-26 | End: 2022-07-28 | Stop reason: HOSPADM

## 2022-07-26 RX ORDER — HYDROCHLOROTHIAZIDE 12.5 MG/1
12.5 CAPSULE, GELATIN COATED ORAL DAILY
Status: DISCONTINUED | OUTPATIENT
Start: 2022-07-27 | End: 2022-07-28 | Stop reason: HOSPADM

## 2022-07-26 RX ADMIN — DOXYCYCLINE 100 MG: 100 CAPSULE ORAL at 09:55

## 2022-07-26 RX ADMIN — CARVEDILOL 6.25 MG: 3.12 TABLET, FILM COATED ORAL at 12:32

## 2022-07-26 RX ADMIN — IPRATROPIUM BROMIDE AND ALBUTEROL SULFATE 1 AMPULE: 2.5; .5 SOLUTION RESPIRATORY (INHALATION) at 13:39

## 2022-07-26 RX ADMIN — DIPHENHYDRAMINE HCL 25 MG: 25 TABLET ORAL at 21:44

## 2022-07-26 RX ADMIN — LISINOPRIL 20 MG: 20 TABLET ORAL at 12:32

## 2022-07-26 RX ADMIN — IPRATROPIUM BROMIDE AND ALBUTEROL SULFATE 1 AMPULE: 2.5; .5 SOLUTION RESPIRATORY (INHALATION) at 20:59

## 2022-07-26 RX ADMIN — IPRATROPIUM BROMIDE AND ALBUTEROL SULFATE 1 AMPULE: .5; 2.5 SOLUTION RESPIRATORY (INHALATION) at 01:14

## 2022-07-26 RX ADMIN — HYDROCHLOROTHIAZIDE 25 MG: 25 TABLET ORAL at 09:55

## 2022-07-26 RX ADMIN — CARVEDILOL 6.25 MG: 3.12 TABLET, FILM COATED ORAL at 20:32

## 2022-07-26 RX ADMIN — METHYLPREDNISOLONE SODIUM SUCCINATE 40 MG: 40 INJECTION, POWDER, FOR SOLUTION INTRAMUSCULAR; INTRAVENOUS at 06:37

## 2022-07-26 RX ADMIN — METHYLPREDNISOLONE SODIUM SUCCINATE 125 MG: 125 INJECTION, POWDER, FOR SOLUTION INTRAMUSCULAR; INTRAVENOUS at 00:10

## 2022-07-26 RX ADMIN — ENOXAPARIN SODIUM 40 MG: 100 INJECTION SUBCUTANEOUS at 09:55

## 2022-07-26 RX ADMIN — DOXYCYCLINE 100 MG: 100 CAPSULE ORAL at 20:31

## 2022-07-26 RX ADMIN — AZITHROMYCIN MONOHYDRATE 500 MG: 500 INJECTION, POWDER, LYOPHILIZED, FOR SOLUTION INTRAVENOUS at 03:29

## 2022-07-26 RX ADMIN — LEVETIRACETAM 500 MG: 500 TABLET, FILM COATED ORAL at 20:32

## 2022-07-26 RX ADMIN — TAMSULOSIN HYDROCHLORIDE 0.4 MG: 0.4 CAPSULE ORAL at 20:33

## 2022-07-26 RX ADMIN — ASPIRIN 81 MG: 81 TABLET, COATED ORAL at 09:55

## 2022-07-26 RX ADMIN — PANTOPRAZOLE SODIUM 40 MG: 40 TABLET, DELAYED RELEASE ORAL at 09:55

## 2022-07-26 RX ADMIN — MAGNESIUM SULFATE HEPTAHYDRATE 2000 MG: 40 INJECTION, SOLUTION INTRAVENOUS at 00:17

## 2022-07-26 RX ADMIN — FERROUS SULFATE TAB EC 325 MG (65 MG FE EQUIVALENT) 325 MG: 325 (65 FE) TABLET DELAYED RESPONSE at 09:55

## 2022-07-26 RX ADMIN — SODIUM CHLORIDE, PRESERVATIVE FREE 10 ML: 5 INJECTION INTRAVENOUS at 09:55

## 2022-07-26 RX ADMIN — CEFTRIAXONE SODIUM 1000 MG: 1 INJECTION, POWDER, FOR SOLUTION INTRAMUSCULAR; INTRAVENOUS at 02:34

## 2022-07-26 RX ADMIN — OXYCODONE HYDROCHLORIDE AND ACETAMINOPHEN 500 MG: 500 TABLET ORAL at 09:55

## 2022-07-26 RX ADMIN — SODIUM CHLORIDE: 9 INJECTION, SOLUTION INTRAVENOUS at 03:27

## 2022-07-26 RX ADMIN — MORPHINE SULFATE 4 MG: 4 INJECTION, SOLUTION INTRAMUSCULAR; INTRAVENOUS at 00:10

## 2022-07-26 ASSESSMENT — ENCOUNTER SYMPTOMS
DIARRHEA: 0
CHEST TIGHTNESS: 0
VOMITING: 0
COUGH: 0
BACK PAIN: 0
NAUSEA: 0
COUGH: 1
SHORTNESS OF BREATH: 1
CONSTIPATION: 0
ABDOMINAL PAIN: 0

## 2022-07-26 ASSESSMENT — PAIN SCALES - GENERAL
PAINLEVEL_OUTOF10: 8
PAINLEVEL_OUTOF10: 0

## 2022-07-26 NOTE — RT PROTOCOL NOTE
RT Inhaler-Nebulizer Bronchodilator Protocol Note    There is a bronchodilator order in the chart from a provider indicating to follow the RT Bronchodilator Protocol and there is an Initiate RT Inhaler-Nebulizer Bronchodilator Protocol order as well (see protocol at bottom of note). CXR Findings:  XR CHEST PORTABLE    Result Date: 7/26/2022  Unchanged right perihilar and right basilar opacities. Unchanged tiny right pleural effusion. The findings from the last RT Protocol Assessment were as follows:   History Pulmonary Disease: None or smoker <15 pack years  Respiratory Pattern: Dyspnea on exertion or RR 21-25 bpm  Breath Sounds: Inspiratory and expiratory or bilateral wheezing and/or rhonchi  Cough: Strong, spontaneous, non-productive  Indication for Bronchodilator Therapy: On home bronchodilators  Bronchodilator Assessment Score: 8    Aerosolized bronchodilator medication orders have been revised according to the RT Inhaler-Nebulizer Bronchodilator Protocol below. Respiratory Therapist to perform RT Therapy Protocol Assessment initially then follow the protocol. Repeat RT Therapy Protocol Assessment PRN for score 0-3 or on second treatment, BID, and PRN for scores above 3. No Indications - adjust the frequency to every 6 hours PRN wheezing or bronchospasm, if no treatments needed after 48 hours then discontinue using Per Protocol order mode. If indication present, adjust the RT bronchodilator orders based on the Bronchodilator Assessment Score as indicated below. Use Inhaler orders unless patient has one or more of the following: on home nebulizer, not able to hold breath for 10 seconds, is not alert and oriented, cannot activate and use MDI correctly, or respiratory rate 25 breaths per minute or more, then use the equivalent nebulizer order(s) with same Frequency and PRN reasons based on the score.   If a patient is on this medication at home then do not decrease Frequency below that used at home. 0-3 - enter or revise RT bronchodilator order(s) to equivalent RT Bronchodilator order with Frequency of every 4 hours PRN for wheezing or increased work of breathing using Per Protocol order mode. 4-6 - enter or revise RT Bronchodilator order(s) to two equivalent RT bronchodilator orders with one order with BID Frequency and one order with Frequency of every 4 hours PRN wheezing or increased work of breathing using Per Protocol order mode. 7-10 - enter or revise RT Bronchodilator order(s) to two equivalent RT bronchodilator orders with one order with TID Frequency and one order with Frequency of every 4 hours PRN wheezing or increased work of breathing using Per Protocol order mode. 11-13 - enter or revise RT Bronchodilator order(s) to one equivalent RT bronchodilator order with QID Frequency and an Albuterol order with Frequency of every 4 hours PRN wheezing or increased work of breathing using Per Protocol order mode. Greater than 13 - enter or revise RT Bronchodilator order(s) to one equivalent RT bronchodilator order with every 4 hours Frequency and an Albuterol order with Frequency of every 2 hours PRN wheezing or increased work of breathing using Per Protocol order mode. RT to enter RT Home Evaluation for COPD & MDI Assessment order using Per Protocol order mode.     Electronically signed by Lenard Singh RCP on 7/26/2022 at 1:41 PM

## 2022-07-26 NOTE — ED NOTES
Pt removed IV and undressed himself. Pt changed and repositioned in bed.       Lee Anderson RN  07/26/22 8580

## 2022-07-26 NOTE — PROGRESS NOTES
Physical Therapy  Facility/Department: [facility] MED SURG  Physical Therapy Initial Assessment    Name: Hortensia Obrien.  : 1950  MRN: [id number]  Date of Service: 2022    Discharge Recommendations:  Patient would benefit from continued therapy after discharge    Pt currently functioning below baseline. Recommend daily inpatient skilled therapy at time of discharge to maximize long term outcomes and prevent re-admission. Please refer to AM-PAC score for current level of function. PER HPI:  Patient presents to the emergency room today with complaints of shortness of breath. Patient states that he developed shortness of breath yesterday and it had progressively worsened. Patient has a significant past medical history of hypothyroidism, CKD, hypertension, GERD and hep C. Patient denies any fevers, cough, chills, chest pain, nausea, vomiting and diarrhea. Patient denies being around anyone else that has been sick. Patient denies any issues drinking/swallowing. Patient denies any home oxygen usage         Patient Diagnosis(es): The primary encounter diagnosis was COPD exacerbation ([address] A diagnosis of Pneumonia of both lower lobes due to infectious organism was also pertinent to this visit.   Past Medical History:  has a past medical history of Absence of toe ([address] Acquired hypothyroidism, Acute kidney injury ([address] CARLTON (acute kidney injury) ([address] Anemia due to stage 3 chronic kidney disease ([address] Arthritis, Benign hypertension with chronic kidney disease, stage IV ([address] Benign prostatic hyperplasia, Benign prostatic hyperplasia without lower urinary tract symptoms, Bradycardia, Bunion of right foot, Cellulitis of left upper extremity, Cellulitis of left upper limb, Chronic renal failure syndrome, Elevated lactic acid level, Epilepsy ([address] Erectile dysfunction, Esophagitis determined by endoscopy, Essential hypertension, Family history of diabetes mellitus in brother, Fever, Former cigarette smoker, Full dentures, Gastro-esophageal reflux disease with esophagitis, Hepatitis C antibody positive in blood, Hiatal hernia, Hiatal hernia with gastroesophageal reflux, High anion gap metabolic acidosis, History of colonic polyps, Hypertension, Hypertensive disorder, Hypothyroidism, Increased lactic acid level, Influenza A, Need for influenza vaccination, Normocytic normochromic anemia, Osteomyelitis (HCC), P-ANCA and MPO antibodies positive, Pneumonia, Renal agenesis, Sepsis ([address] Severe sepsis ([address] Severe sepsis with acute organ dysfunction ([address] Skin necrosis ([address] Solitary kidney, congenital, Spider bite, Urinary retention, Vitamin D deficiency, and Wears glasses. 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 Assessment   Body Structures, Functions, Activity Limitations Requiring Skilled Therapeutic Intervention: Decreased functional mobility ; Decreased safe awareness;Decreased balance;Decreased endurance  Assessment: Skilled therapy needed to maximize independence with functional  mobiilty  as well as improve endurance and balance. Patient is currently SBA/Min A with all mobility, R UE NWB and demonstrates poor safety awareness. Recommend further therapy following discharge.   Decision Making: Medium Complexity  Exam: AROM, strength, endurance, balance, mobility, AM-PAC  Clinical Presentation: evolving  Requires PT Follow-Up: Yes  Activity Tolerance  Activity Tolerance: Patient tolerated evaluation without incident     Plan   Plan  Plan: 5-7 times per week  Current Treatment Recommendations: Strengthening, Balance training, Transfer training, Gait training, Endurance training, Patient/Caregiver education & training, Safety education & training, Positioning, Therapeutic activities  Safety Devices  Type of Devices: Gait belt, Nurse notified, Patient at risk for falls, Call light within reach, Chair alarm in place, Left in chair     Restrictions  Restrictions/Precautions  Restrictions/Precautions: Fall Risk, Seizure  Position Activity Restriction  Other position/activity restrictions: 3L O2, recent rib fx's, R UE sling/NWB per dtr as he has a clavicle fx/no notes in chart, alarms, up as fco, easy to chew diet     Subjective   General  Chart Reviewed: Yes  Patient assessed for rehabilitation services?: Yes  Family / Caregiver Present: Yes (daughter present for most of eval)  General Comment  Comments: RN states patient appropriate for therapy  Subjective  Subjective: patient pleasant and agreeable to work with therapy         Social/Functional History  Social/Functional History  Lives With: Spouse (recently at [facility] for 2 weeks.  Spouse works days)  Type of Home: House  Home Layout: Multi-level, Bed/Bath upstairs, 1/2 bath on main level (5 going up to bed with R HR,)  Home Access: Stairs to enter without rails  Entrance Stairs - Number of Steps: 3  Bathroom Shower/Tub: Walk-in shower  Bathroom Toilet: Standard  Bathroom Equipment: Grab bars in [address] [place], [place] [address]vard: [facility]  Has the patient had two or more falls in the past year or any fall with injury in the past year?: No (one big fall 7/7 may have been due to a seizure)  Receives Help From: Family (pt has a supportive dtr however she works and has children)  ADL Assistance: [facility] [address]: Independent (shares duties with spouse however pt was completing majority due to spouse working)  Ambulation Assistance: Independent  Transfer Assistance: Independent  Active : No  Patient's  Info: family  Occupation: On disability  Leisure & Hobbies: yardwork, TV  Additional Comments: Pt's dtr states pt had fall and unsure what happened/possible seisure. Went to [facility] and was DC to [facility] for rehab. Dtr stated pt was there approx 1 week and back to hospital due to SOB. Vision/Hearing  Vision  Vision: Impaired  Vision Exceptions: Wears glasses at all times  Hearing  Hearing: Within functional limits    Cognition   Orientation  Overall Orientation Status: Within Functional Limits  Orientation Level: Oriented to person;Oriented to time;Oriented to place; Disoriented to situation  Cognition  Overall Cognitive Status: Exceptions  Arousal/Alertness: Appropriate responses to stimuli  Following Commands:  Follows one step commands consistently  Attention Span: Attends with cues to redirect  Memory: Decreased short term memory;Decreased recall of recent events  Safety Judgement: Decreased awareness of need for assistance;Decreased awareness of need for safety  Problem Solving: Decreased awareness of errors;Assistance required to generate solutions  Insights: Decreased awareness of deficits  Initiation: Requires cues for some  Sequencing: Requires cues for some     Objective   Heart Rate: 65  Heart Rate Source: Monitor  BP: (!) 144/89  BP Location: Left Arm  MAP (Calculated): 107.33  Resp: 20  SpO2: 98 %  O2 Device: Nasal cannula     Observation/Palpation  Posture: Fair  Observation: R UE Sling, O2 2L  Edema: min B LE        AROM RLE (degrees)  RLE AROM: WFL  AROM LLE (degrees)  LLE AROM : WFL  AROM RUE (degrees)  RUE General AROM: refer to OT assessment  AROM LUE (degrees)  LUE General AROM: refer to OT assessment  Strength RLE  Strength RLE: WFL  Strength LLE  Strength LLE: WFL  Strength RUE  Comment: refer to OT assessment  Strength LUE  Comment: refer to OT assessment     Sensation  Overall Sensation Status: WFL  Bed Mobility Training  Bed Mobility Training: Yes  Overall Level of Assistance: Stand-by assistance  Interventions: Verbal cues (MOD cues/tactile assist for LUE hand placement on bed rail, RUE NWB, awareness/assist with lines to increase safety)  Rolling: Stand-by assistance  Supine to Sit: Stand-by assistance  Sit to Supine:  (N/T and pt agreed to sit up in chair after edu on benefits of being up OOB as able)  Balance  Sitting:  (CGA-SBA)  Standing:  (min assist with cane)  Transfer Training  Transfer Training: Yes  Overall Level of Assistance: Minimum assistance (with cane)  Interventions: Verbal cues; Safety awareness training; Tactile cues (MAX-MOD cues/tactile assist for pacing, upright posture, cane safety, LUE hand placement pushing from surface seated on as well as reaching back, controlled stand to sit, proper placement of cane vs throwing on the ground, and awareness/assist with lines)  Sit to Stand: Minimum assistance  Stand to Sit: Minimum assistance  Bed to Chair: Minimum assistance  Toilet Transfer:  (N/T and pt with no needs during session)  Gait  Overall Level of Assistance: Minimum assistance (bed to chair only with use of cane)  Interventions: Verbal cues; Safety awareness training; Tactile cues (MAX-MOD cues/tactile assist for upright posture, scanning, pacing, pursed lip breathing, cane safety and awareness/assist with lines to increase safety/reduce falls.)  Bed mobility  Supine to Sit: Stand by assistance  Scooting: Stand by assistance  Bed Mobility Comments: HOB slightly elevated, good use of L UE to assist with transfer  Transfers  Sit to Stand: Minimal Assistance  Stand to sit: Minimal Assistance  Comment: slightly unsteady, stood at bedside with min A as brief placed  Ambulation  WB Status: R UE NWB (per daughter)  Ambulation  Surface: level tile  Device: Single point cane  Assistance: Minimal assistance  Gait Deviations: Decreased step length;Decreased step height;Slow Jennifer  Distance: 4 feet (bed to chair)  Comments: per patient he was using aurora walker at [facility], attempted str cane and patient required Min A, per patient cane felt the same as aurora walker. Slightly unsteady, cues to reach back when performing stand>sit     Balance  Sitting - Static: Good  Sitting - Dynamic: Good  Standing - Static: Fair  Standing - Dynamic: Fair;-             AM-PAC Score  AM-PAC Inpatient Mobility Raw Score : 17 (07/26/22 1157)  AM-PAC Inpatient T-Scale Score : 42.13 (07/26/22 1157)  Mobility Inpatient CMS 0-100% Score: 50.57 (07/26/22 1157)  Mobility Inpatient CMS G-Code Modifier : CK (07/26/22 1157)                 Goals  Short Term Goals  Time Frame for Short term goals: 12 visits  Short term goal 1: Independent bed mobility  Short term goal 2:  Independent sit<>stand  Short term goal 3: Patient to ambulate 50 feet str cane vs aurora walker SBA  Short term goal 4: Patient to perform 25 minutes ther act to facilitate improving endurance  Patient Goals   Patient goals : to get stronger       Education   Role of Therapy; Plan of Care; Safety; Transfer Training; Mobility Training      Therapy Time   Individual Concurrent Group Co-treatment   Time In 7073         Time Out 0852 (additional 10 minutes for chart review)         Minutes 30+10=40             Treatment time: 20 minutes    Brayden Henderson, PT

## 2022-07-26 NOTE — ED NOTES
ED to inpatient nurses report     Chief Complaint   Patient presents with    Shortness of Breath    Rib Pain      Present to ED from [facility] facility via EMS for c/o SOB and chest pain. Pt at facility for fall approximately one week ago. Pt has fractured ribs from falls. Pt is suppose to wear 2L of O2 at home. Pt currently on 2L nasal cannula but keeps taking it off. LOC: alert and orientated to name and place  Vital signs   Vitals:    07/26/22 0405 07/26/22 0430 07/26/22 0500 07/26/22 0600   BP:   98/67 107/78   Pulse: 64 72 61 60   Resp: 12 19 12    Temp:       SpO2: 94% 94%  94%      Oxygen Baseline 2L     Current needs required 2L   LDAs:   Peripheral IV 07/26/22 Right Forearm (Active)   Site Assessment Clean, dry & intact 07/26/22 0600   Line Status Blood return noted;Brisk blood return;Flushed 07/26/22 0600   Phlebitis Assessment No symptoms 07/26/22 0600   Infiltration Assessment 0 07/26/22 0600   Dressing Status Clean, dry & intact; New dressing applied 07/26/22 0600     Mobility: Requires assistance * 1  Fall Risk: [place] 1 Fall Risk  Presents to emergency department  because of falls (Syncope, seizure, or loss of consciousness): No (07/25/22 2223)  Age > 79: Yes (07/25/22 2223)  Altered Mental Status, Intoxication with alcohol or substance confusion (Disorientation, impaired judgment, poor safety awaremess, or inability to follow instructions): No (07/25/22 2223)  Impaired Mobility: Ambulates or transfers with assistive devices or assistance;  Unable to ambulate or transer.: No (07/25/22 2223)  Nursing Judgement: Yes (07/25/22 2223)  Pending ED orders: Protonix before breakfast  Present condition: Stable  Code Status: Full  Consults: IP CONSULT TO HOSPITALIST  [x]  Hospitalist  Completed  [x] yes [] no Who: Mellissa Carbajal   []  Medicine  Completed  [] yes [] No Who:   []  Cardiology  Completed  [] yes [] No Who:   []  GI   Completed  [] yes [] No Who:   []  Neurology  Completed  [] yes [] No Who:   [] Nephrology Completed  [] yes [] No Who:    []  Vascular  Completed  [] yes [] No Who:   []  Ortho  Completed  [] yes [] No Who:     []  Surgery  Completed  [] yes [] No Who:    []  Urology  Completed  [] yes [] No Who:    []  CT Surgery Completed  [] yes [] No Who:   []  Podiatry  Completed  [] yes [] No Who:    []  Other    Completed  [] yes [] No Who:  Interventions: 4mg morphine. 125mg Solumedrol. 2g Mag. Neb treatment from RT. Rocephin 1g. Zithromax 500mg. Cont. Fluids 75ml/hr. Important Events: Chest XR showed pneumonia bilateral lower lobes.         Electronically signed by Darion Galarza RN on 7/26/2022 at 7:08 AM       Darion Galarza RN  07/26/22 5729
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
## 2022-07-26 NOTE — ED NOTES
Pt repositioned in bed. Pt given warm blanket, lights are dimmed.       Eric Quintana RN  07/26/22 0307

## 2022-07-26 NOTE — ED PROVIDER NOTES
[address] [facility]  Emergency Department Encounter     Pt Name: Yrn Loomis MRN: [id number]  Birthdate 1950  Date of evaluation: 7/25/22  PCP:  Dimitris Solo MD    CHIEF COMPLAINT       Chief Complaint   Patient presents with    Shortness of Breath    Rib Pain       HISTORY OF PRESENT ILLNESS  (Location/Symptom, Timing/Onset, Context/Setting, Quality, Duration, Modifying Factors, Severity.)    Yrn Loomis is a 70 y.o. male who has a past medical history of COPD but does not wear oxygen at home presents emergency department via EMS for chest pain, shortness of breath. Patient recently diagnosed with multiple rib fractures and discharged back to a nursing facility. Initial evaluation patient writhing in the bed in pain, not providing much of a accurate history. He was placed on nonrebreather by EMS however this was for comfort and he is not necessitating oxygen to have appropriate levels above 90%.     PAST MEDICAL / SURGICAL / SOCIAL / FAMILY HISTORY    has a past medical history of Absence of toe ([address] Acquired hypothyroidism, Acute kidney injury ([address] CARLTON (acute kidney injury) ([address] Anemia due to stage 3 chronic kidney disease ([address] Arthritis, Benign hypertension with chronic kidney disease, stage IV ([address] Benign prostatic hyperplasia, Benign prostatic hyperplasia without lower urinary tract symptoms, Bradycardia, Bunion of right foot, Cellulitis of left upper extremity, Cellulitis of left upper limb, Chronic renal failure syndrome, Elevated lactic acid level, Epilepsy ([address] Erectile dysfunction, Esophagitis determined by endoscopy, Essential hypertension, Family history of diabetes mellitus in brother, Fever, Former cigarette smoker, Full dentures, Gastro-esophageal reflux disease with esophagitis, Hepatitis C antibody positive in blood, Hiatal hernia, Hiatal hernia with gastroesophageal reflux, High anion gap metabolic acidosis, History of colonic file   Housing Stability: Not on file       Family History   Problem Relation Age of Onset    High Blood Pressure Mother     Arthritis Mother     Heart Disease Sister     Stomach Cancer Sister     Diabetes Brother        Allergies:    Patient has no known allergies. Home Medications:  Prior to Admission medications    Medication Sig Start Date End Date Taking? Authorizing Provider   Multiple Vitamins-Minerals (MULTIVITAMIN ADULT PO) Take 1 tablet by mouth daily     Historical Provider, MD   VITAMIN E PO Take 1 capsule by mouth daily    Historical Provider, MD   omeprazole 20 MG EC tablet Take 20 mg by mouth daily 9/9/19   Historical Provider, MD   levothyroxine (SYNTHROID) 175 MCG tablet Take 175 mcg by mouth Daily    Historical Provider, MD   carvedilol (COREG) 6.25 MG tablet Take 6.25 mg by mouth 2 times daily (with meals)    Historical Provider, MD   ferrous sulfate 325 (65 Fe) MG tablet Take 325 mg by mouth daily 1/6/20   Historical Provider, MD   vitamin D-3 (CHOLECALCIFEROL) 125 MCG (5000 UT) TABS Take 5,000 Units by mouth daily  6/27/19   Historical Provider, MD   vitamin C (ASCORBIC ACID) 500 MG tablet Take 500 mg by mouth daily    Historical Provider, MD   aspirin 81 MG tablet Take 81 mg by mouth daily    Historical Provider, MD   hydrochlorothiazide (HYDRODIURIL) 25 MG tablet Take 25 mg by mouth daily  3/13/16   Historical Provider, MD   lisinopril (PRINIVIL;ZESTRIL) 5 MG tablet Take 5 mg by mouth daily    Historical Provider, MD   tamsulosin (FLOMAX) 0.4 MG capsule Take 0.4 mg by mouth nightly     Historical Provider, MD       REVIEW OF SYSTEMS    (2-9 systems for level 4, 10 or more for level 5)    Review of Systems   Constitutional:  Negative for chills, diaphoresis and fever. Respiratory:  Positive for cough and shortness of breath. Cardiovascular:  Positive for chest pain. Gastrointestinal:  Negative for abdominal pain. Genitourinary:  Positive for flank pain.    Musculoskeletal:  Negative for back pain. All other systems reviewed and are negative. PHYSICAL EXAM   (up to 7 for level 4, 8 or more for level 5)    VITALS:   Vitals:    07/26/22 0400 07/26/22 0405 07/26/22 0430 07/26/22 0500   BP: 96/67   98/67   Pulse: 64 64 72 61   Resp: 12 12 19 12   Temp:       SpO2: 91% 94% 94%        Physical Exam  Vitals and nursing note reviewed. Constitutional:       General: He is in acute distress. Appearance: He is well-developed. He is not diaphoretic. HENT:      Head: Normocephalic and atraumatic. Eyes:      Conjunctiva/sclera: Conjunctivae normal.   Cardiovascular:      Rate and Rhythm: Normal rate and regular rhythm. Heart sounds: Normal heart sounds. Pulmonary:      Effort: Pulmonary effort is normal. No respiratory distress. Breath sounds: Wheezing present. No rhonchi or rales. Chest:      Chest wall: Tenderness present. Abdominal:      General: There is no distension. Palpations: Abdomen is soft. Tenderness: There is no abdominal tenderness. Musculoskeletal:         General: Normal range of motion. Cervical back: Normal range of motion. Right lower leg: No edema. Left lower leg: No edema. Skin:     General: Skin is warm and dry. Neurological:      General: No focal deficit present. Mental Status: He is alert.    Psychiatric:         Behavior: Behavior normal.       DIFFERENTIAL  DIAGNOSIS   PLAN (LABS / IMAGING / EKG):  Orders Placed This Encounter   Procedures    COVID-19, Rapid    Culture, Blood 1    Culture, Blood 1    Respiratory Culture    XR CHEST 1 VIEW    XR CHEST PORTABLE    CBC with Auto Differential    Comprehensive Metabolic Panel w/ Reflex to MG    Troponin    Brain Natriuretic Peptide    Troponin    Lactate, Sepsis    Basic Metabolic Panel w/ Reflex to MG    Procalcitonin    Telemetry monitoring - continuous duration    Vital signs per unit routine    Notify physician    Up as tolerated    Misc activity order (specify) acetaminophen (TYLENOL) suppository 650 mg    albuterol (PROVENTIL) nebulizer solution 2.5 mg     Order Specific Question:   Initiate RT Bronchodilator Protocol     Answer:   Yes - Inpatient Protocol    DISCONTD: ipratropium-albuterol (DUONEB) nebulizer solution 1 ampule     Order Specific Question:   Initiate RT Bronchodilator Protocol     Answer:   Yes - Inpatient Protocol    OR Linked Order Group     morphine (PF) injection 2 mg     morphine sulfate (PF) injection 4 mg    FOLLOWED BY Linked Order Group     methylPREDNISolone sodium (SOLU-MEDROL) injection 40 mg     predniSONE (DELTASONE) tablet 40 mg    cefTRIAXone (ROCEPHIN) 1,000 mg in dextrose 5 % 50 mL IVPB mini-bag     Order Specific Question:   Antimicrobial Indications     Answer:   COPD Exacerbation     Order Specific Question:   COPD exacerbation duration of therapy     Answer:   5 days    DISCONTD: doxycycline hyclate (VIBRA-TABS) tablet 100 mg     Order Specific Question:   Antimicrobial Indications     Answer:   COPD Exacerbation     Order Specific Question:   COPD exacerbation duration of therapy     Answer:   5 days    diphenhydrAMINE (BENADRYL) tablet 25 mg    DISCONTD: ipratropium-albuterol (DUONEB) nebulizer solution 1 ampule     Order Specific Question:   Initiate RT Bronchodilator Protocol     Answer:   Yes - Inpatient Protocol    doxycycline monohydrate (MONODOX) capsule 100 mg     Order Specific Question:   Antimicrobial Indications     Answer:   COPD Exacerbation     Order Specific Question:   COPD exacerbation duration of therapy     Answer:   5 days    ipratropium-albuterol (DUONEB) nebulizer solution 1 ampule     Order Specific Question:   Initiate RT Bronchodilator Protocol     Answer:   Yes - Inpatient Protocol     DIAGNOSTIC RESULTS / EMERGENCYDEPARTMENT COURSE / MDM   LABS:  Labs Reviewed   CBC WITH AUTO DIFFERENTIAL - Abnormal; Notable for the following components:       Result Value    RBC 4.03 (*)     Hemoglobin 11.0 (*) Hematocrit 35.9 (*)     Platelets 836 (*)     Seg Neutrophils 76 (*)     Lymphocytes 9 (*)     Eosinophils % 11 (*)     Segs Absolute 7.96 (*)     Absolute Lymph # 0.95 (*)     Absolute Eos # 1.16 (*)     All other components within normal limits   COMPREHENSIVE METABOLIC PANEL W/ REFLEX TO MG FOR LOW K - Abnormal; Notable for the following components:    Glucose 143 (*)     BUN 39 (*)     Creatinine 1.52 (*)     Bun/Cre Ratio 26 (*)     Alkaline Phosphatase 137 (*)     Total Bilirubin 0.25 (*)     GFR Non- 45 (*)     GFR  55 (*)     All other components within normal limits   BASIC METABOLIC PANEL W/ REFLEX TO MG FOR LOW K - Abnormal; Notable for the following components:    Glucose 158 (*)     BUN 37 (*)     Creatinine 1.55 (*)     Bun/Cre Ratio 24 (*)     Potassium 5.8 (*)     Anion Gap 7 (*)     GFR Non- 44 (*)     GFR  54 (*)     All other components within normal limits   COVID-19, RAPID   CULTURE, BLOOD 1   CULTURE, BLOOD 1   CULTURE, RESPIRATORY   TROPONIN   BRAIN NATRIURETIC PEPTIDE   TROPONIN   LACTATE, SEPSIS   LACTATE, SEPSIS   PROCALCITONIN       RADIOLOGY:  XR CHEST 1 VIEW    Result Date: 7/25/2022  EXAMINATION: ONE XRAY VIEW OF THE CHEST 7/25/2022 10:41 pm COMPARISON: 11/16/2020 HISTORY: ORDERING SYSTEM PROVIDED HISTORY: JOSE MIGUEL WHITAKER TECHNOLOGIST PROVIDED HISTORY: JOSE MIGUEL CP Reason for Exam: Sob, cough, wheezing, chest pain today FINDINGS: Cardiomediastinal silhouette is unchanged in size. Aortic tortuosity. Small right pleural effusion. No pneumothorax. There are linear bibasilar opacities. There is also consolidation in the right perihilar lung. Suspected large hiatal hernia. No acute osseous abnormality. 1. Right perihilar consolidation and bilateral lower lobe opacities, concerning for pneumonia. 2. Small right pleural effusion 3. Suspected large hiatal hernia.       EKG    EKG Interpretation    Interpreted by emergency department physician    Rhythm: normal sinus   Rate: normal  Axis: normal  Ectopy: none  Conduction: normal  ST Segments: no acute change  T Waves: no acute change  Q Waves: none    Clinical Impression: non-specific EKG    All EKG's are interpreted by the Emergency Department Physician whoeither signs or Co-signs this chart in the absence of a cardiologist.    EMERGENCY DEPARTMENT COURSE:  ED Course as of 07/26/22 0520   Mon Jul 25, 2022   2304 CBC with Auto Differential(!):    WBC 10.5   RBC 4.03(!)   Hemoglobin Quant 11.0(!)   Hematocrit 35.9(!)   MCV 89.1   MCH 27.3   MCHC 30.6   RDW 13.5   Platelet Count 673(!)   MPV 9.7   NRBC Automated 0.0   Seg Neutrophils PENDING   Lymphocytes PENDING   Monocytes PENDING   Eosinophils % PENDING   Basophils PENDING   Immature Granulocytes PENDING   Segs Absolute PENDING   Absolute Lymph # PENDING   Absolute Mono # PENDING   Absolute Eos # PENDING   Basophils Absolute PENDING   Absolute Immature Granulocyte PENDING [AO]   2310 Comprehensive Metabolic Panel w/ Reflex to MG(!):    GLUCOSE, FASTING,(!)   BUN,BUNPL 39(!)   Creatinine 1.52(!)   Bun/Cre Ratio 26(!)   CALCIUM, SERUM, 101327 8.9   Sodium 138   Potassium 5.0   Chloride 101   CO2 28   Anion Gap 9   Alk Phos 137(!)   ALT 19   AST 14   Bilirubin 0.25(!)   Total Protein 7.4   Albumin 3.5   GFR Non- 45(!)   GFR  55(!)   GFR Comment       ckd [AO]   2310 Pro-BNP: 156 [AO]   2310 Troponin, High Sensitivity: 18 [AO]   2321 XR CHEST 1 VIEW [AO]   2357 Intermed accepted  [AO]      ED Course User Index  [AO] Becky Walhalla Gibson 1721, DO       MDM     Amount and/or Complexity of Data Reviewed  Clinical lab tests: ordered and reviewed  Tests in the radiology section of CPT®: ordered and reviewed  Review and summarize past medical records: yes  Discuss the patient with other providers: yes  Independent visualization of images, tracings, or specimens: yes    Patient Progress  Patient progress: stable      PROCEDURES:  Procedures     CONSULTS:  IP CONSULT TO HOSPITALIST    CRITICAL CARE:  NONE    FINAL IMPRESSION     1. COPD exacerbation ([address]    2. Pneumonia of both lower lobes due to infectious organism       DISPOSITION / PLAN   DISPOSITION Admitted 07/25/2022 11:59:11 PM    ADMIT    Morenita Mcknight DO  Emergency Medicine Physician    (Please note that portions of this note were completed with a voice recognition program.  Efforts were made to edit the dictations but occasionally words are mis-transcribed.)        Becky Gibson 1721DO  07/26/22 0522

## 2022-07-26 NOTE — PROGRESS NOTES
Transitions of Care Pharmacy Service   Medication Review    The patient's list of current home medications has been reviewed. Source(s) of information: patient, Care Everywhere, Surescripts refill report, facility med list ([address], admitted 7/18/22)      PROVIDER ACTION REQUESTED  Medications that need to be addressed by a physician/nurse practitioner:    Medication Action Requested   HCTZ 25mg daily     25mg daily is his outpatient home dose, but he was getting 12.5mg daily at the facility instead -- consider adjusting order if appropriate      Remaining med list is ready for physician review (keppra, etc)           Please feel free to call me with any questions about this encounter. Thank you. Erasto Keenan [facility]   Transitions of Care Pharmacy Service  Phone:  [phone]  Fax: [phone]      Electronically signed by Erasto Keenan [facility] on 7/26/2022 at 6:45 PM           Medications Prior to Admission:   budesonide-formoterol (SYMBICORT) 160-4.5 MCG/ACT AERO, Inhale 2 puffs into the lungs in the morning and 2 puffs before bedtime. pantoprazole (PROTONIX) 40 MG tablet, Take 40 mg by mouth in the morning. albuterol sulfate HFA (VENTOLIN HFA) 108 (90 Base) MCG/ACT inhaler, Inhale 2 puffs into the lungs every 6 hours as needed for Wheezing  cefUROXime (CEFTIN) 500 MG tablet, Take 500 mg by mouth in the morning and 500 mg before bedtime. cyclobenzaprine (FLEXERIL) 10 MG tablet, Take 10 mg by mouth every 8 hours as needed for Muscle spasms  gabapentin (NEURONTIN) 300 MG capsule, Take 300 mg by mouth every 8 hours as needed. levETIRAcetam (KEPPRA) 500 MG tablet, Take 500 mg by mouth in the morning and 500 mg before bedtime. lidocaine (LIDODERM) 5 %, Place 1 patch onto the skin in the morning. 12 hours on, 12 hours off. .  oxyCODONE (ROXICODONE) 5 MG immediate release tablet, Take 5 mg by mouth every 8 hours as needed for Pain.  triamcinolone (KENALOG) 0.1 % ointment, Apply topically 2 times daily Indications: to dermatitis areas  levothyroxine (SYNTHROID) 175 MCG tablet, Take 175 mcg by mouth Daily  carvedilol (COREG) 6.25 MG tablet, Take 6.25 mg by mouth 2 times daily (with meals)  ferrous sulfate 325 (65 Fe) MG tablet, Take 325 mg by mouth daily  vitamin D-3 (CHOLECALCIFEROL) 125 MCG (5000 UT) TABS, Take 5,000 Units by mouth daily   vitamin C (ASCORBIC ACID) 500 MG tablet, Take 500 mg by mouth daily  aspirin 81 MG EC tablet, Take 81 mg by mouth daily  hydroCHLOROthiazide (HYDRODIURIL) 12.5 MG tablet, Take 12.5 mg by mouth in the morning. lisinopril (PRINIVIL;ZESTRIL) 20 MG tablet, Take 20 mg by mouth in the morning.   tamsulosin (FLOMAX) 0.4 MG capsule, Take 0.4 mg by mouth nightly

## 2022-07-26 NOTE — RT PROTOCOL NOTE
RT Inhaler-Nebulizer Bronchodilator Protocol Note    There is a bronchodilator order in the chart from a provider indicating to follow the RT Bronchodilator Protocol and there is an Initiate RT Inhaler-Nebulizer Bronchodilator Protocol order as well (see protocol at bottom of note). CXR Findings:  No results found. The findings from the last RT Protocol Assessment were as follows:   History Pulmonary Disease: None or smoker <15 pack years  Respiratory Pattern: Dyspnea on exertion or RR 21-25 bpm  Breath Sounds: Inspiratory and expiratory or bilateral wheezing and/or rhonchi  Cough: Strong, spontaneous, non-productive  Indication for Bronchodilator Therapy:    Bronchodilator Assessment Score: 8    Aerosolized bronchodilator medication orders have been revised according to the RT Inhaler-Nebulizer Bronchodilator Protocol below. Respiratory Therapist to perform RT Therapy Protocol Assessment initially then follow the protocol. Repeat RT Therapy Protocol Assessment PRN for score 0-3 or on second treatment, BID, and PRN for scores above 3. No Indications - adjust the frequency to every 6 hours PRN wheezing or bronchospasm, if no treatments needed after 48 hours then discontinue using Per Protocol order mode. If indication present, adjust the RT bronchodilator orders based on the Bronchodilator Assessment Score as indicated below. Use Inhaler orders unless patient has one or more of the following: on home nebulizer, not able to hold breath for 10 seconds, is not alert and oriented, cannot activate and use MDI correctly, or respiratory rate 25 breaths per minute or more, then use the equivalent nebulizer order(s) with same Frequency and PRN reasons based on the score. If a patient is on this medication at home then do not decrease Frequency below that used at home.     0-3 - enter or revise RT bronchodilator order(s) to equivalent RT Bronchodilator order with Frequency of every 4 hours PRN for wheezing or increased work of breathing using Per Protocol order mode. 4-6 - enter or revise RT Bronchodilator order(s) to two equivalent RT bronchodilator orders with one order with BID Frequency and one order with Frequency of every 4 hours PRN wheezing or increased work of breathing using Per Protocol order mode. 7-10 - enter or revise RT Bronchodilator order(s) to two equivalent RT bronchodilator orders with one order with TID Frequency and one order with Frequency of every 4 hours PRN wheezing or increased work of breathing using Per Protocol order mode. 11-13 - enter or revise RT Bronchodilator order(s) to one equivalent RT bronchodilator order with QID Frequency and an Albuterol order with Frequency of every 4 hours PRN wheezing or increased work of breathing using Per Protocol order mode. Greater than 13 - enter or revise RT Bronchodilator order(s) to one equivalent RT bronchodilator order with every 4 hours Frequency and an Albuterol order with Frequency of every 2 hours PRN wheezing or increased work of breathing using Per Protocol order mode. RT to enter RT Home Evaluation for COPD & MDI Assessment order using Per Protocol order mode.     Electronically signed by Fantasma Mars RCP on 7/26/2022 at 1:21 AM

## 2022-07-26 NOTE — PROGRESS NOTES
Occupational Therapy  Facility/Department: [facility] MED SURG  Occupational Therapy Initial Assessment    Name: Nhi Garza.  : 1950  MRN: [id number]  Date of Service: 2022    ALFONZO Todd reports patient is medically stable for therapy treatment this date. Chart reviewed prior to treatment and patient is agreeable for therapy. All lines intact and patient positioned comfortably at end of treatment. All patient needs addressed prior to ending therapy session. Pt currently functioning below baseline. Recommend daily inpatient skilled therapy at time of discharge to maximize long term outcomes and prevent re-admission. Please refer to AM-PAC score for current level of function. Discharge Recommendations:  Patient would benefit from continued therapy after discharge  OT Equipment Recommendations  Equipment Needed: Yes  Mobility Devices: ADL Assistive Devices  ADL Assistive Devices: Toileting - 3-in-1 Commode;Reacher;Long-handled Shoe Horn;Long-handled Sponge;Emergency Alert System       Patient Diagnosis(es): The primary encounter diagnosis was COPD exacerbation ([address] A diagnosis of Pneumonia of both lower lobes due to infectious organism was also pertinent to this visit.   Past Medical History:  has a past medical history of Absence of toe ([address] Acquired hypothyroidism, Acute kidney injury ([address] CARLTON (acute kidney injury) ([address] Anemia due to stage 3 chronic kidney disease ([address] Arthritis, Benign hypertension with chronic kidney disease, stage IV ([address] Benign prostatic hyperplasia, Benign prostatic hyperplasia without lower urinary tract symptoms, Bradycardia, Bunion of right foot, Cellulitis of left upper extremity, Cellulitis of left upper limb, Chronic renal failure syndrome, Elevated lactic acid level, Epilepsy ([address] Erectile dysfunction, Esophagitis determined by endoscopy, Essential hypertension, Family history of diabetes mellitus in brother, Fever, Former cigarette smoker, Full dentures, Gastro-esophageal reflux disease with esophagitis, Hepatitis C antibody positive in blood, Hiatal hernia, Hiatal hernia with gastroesophageal reflux, High anion gap metabolic acidosis, History of colonic polyps, Hypertension, Hypertensive disorder, Hypothyroidism, Increased lactic acid level, Influenza A, Need for influenza vaccination, Normocytic normochromic anemia, Osteomyelitis (HCC), P-ANCA and MPO antibodies positive, Pneumonia, Renal agenesis, Sepsis ([address] Severe sepsis ([address] Severe sepsis with acute organ dysfunction ([address] Skin necrosis ([address] Solitary kidney, congenital, Spider bite, Urinary retention, Vitamin D deficiency, and Wears glasses. 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 PER H&P: Patient presents to the emergency room today with complaints of shortness of breath. Patient states that he developed shortness of breath yesterday and it had progressively worsened. Patient has a significant past medical history of hypothyroidism, CKD, hypertension, GERD and hep C. Patient denies any fevers, cough, chills, chest pain, nausea, vomiting and diarrhea. Patient denies being around anyone else that has been sick. Patient denies any issues drinking/swallowing. Patient denies any home oxygen usage      Assessment   Performance deficits / Impairments: Decreased functional mobility ; Decreased safe awareness;Decreased balance;Decreased coordination;Decreased ADL status; Decreased ROM; Decreased endurance;Decreased strength;Decreased fine motor control;Decreased high-level IADLs;Decreased cognition;Decreased posture  Assessment: Pt is a high fall risk, has poor safety awareness in function and reduced act tolerance/balance. Skilled OT services are indicated to maximize functional I and safety as able. Prognosis: Fair;Good  Decision Making: Medium Complexity  REQUIRES OT FOLLOW-UP: Yes  Activity Tolerance  Activity Tolerance: Patient limited by fatigue;Treatment limited secondary to decreased cognition  Activity Tolerance Comments: stand fco < 1 min; overall act tolerance poor minus and pt fatigues easily and some wheezing noted        Plan   Plan  Times per Week: 4-5x/week 1x/day as fco  Current Treatment Recommendations: Strengthening, ROM, Balance training, Functional mobility training, Endurance training, Cognitive reorientation, Neuromuscular re-education, Safety education & training, Positioning, Patient/Caregiver education & training, Self-Care / ADL, Equipment evaluation, education, & procurement, Home management training, Coordination training, Cognitive/Perceptual training, Pain management     Restrictions  Restrictions/Precautions  Restrictions/Precautions: Fall Risk, Seizure  Position Activity Restriction  Other position/activity restrictions: 3L O2, recent rib fx's, R UE sling/NWB per dtr as he has a clavicle fx/no notes in chart, alarms, up as fco, easy to chew diet    Subjective   General  Chart Reviewed: Yes  Patient assessed for rehabilitation services?: Yes  Family / Caregiver Present: Yes (dtr present during half of session)  Subjective  Subjective: Pt denies pain     Social/Functional History  Social/Functional History  Lives With: Spouse (recently at [facility] for 2 weeks.  Spouse works days)  Type of Home: House  Home Layout: Multi-level, Bed/Bath upstairs, 1/2 bath on main level (5 going up to bed with R HR,)  Home Access: Stairs to enter without rails  Entrance Stairs - Number of Steps: 3  Bathroom Shower/Tub: Walk-in shower  Bathroom Toilet: Standard  Bathroom Equipment: Grab bars in [address]: Katina Walker, rolling, Cane  Has the patient had two or more falls in the past year or any fall with injury in the past year?: No (one big fall 7/7 may have been due to a seizure)  Receives Help From: Family (pt has a supportive dtr however she works and has children)  ADL Assistance: Independent  Homemaking Assistance: Independent (shares duties with spouse however pt was completing majority due to spouse working)  Ambulation Assistance: Independent  Transfer Assistance: Independent  Active : No  Patient's  Info: family  Occupation: On disability  Leisure & Hobbies: yardwork, TV  Additional Comments: Pt's dtr states pt had fall and unsure what happened/possible seizure. Went to [facility] and was DC to [facility] for rehab. Dtr stated pt was there approx 1 week and back to hospital due to SOB. Objective           Observation/Palpation  Posture: Fair (with cane)  Palpation: O2 per NC  Observation: RUE Sling on upon arrival; pt with wheezy sounds with activity; difficulties understanding speech at times/slightly slurred  Edema: BLE's    Safety Devices  Type of Devices: All fall risk precautions in place;Call light within reach; Chair alarm in place; Heels elevated for pressure relief;Patient at risk for falls; Left in chair;Nurse notified (BLE's elevated on stool/pillow under; pillow under RUE as well all to increase overall comfort/reduce pain and swelling as able)      Bed Mobility Training  Bed Mobility Training: Yes  Overall Level of Assistance: Stand-by assistance  Interventions: Verbal cues (MOD cues/tactile assist for LUE hand placement on bed rail, RUE NWB, awareness/assist with lines to increase safety)  Rolling: Stand-by assistance  Supine to Sit: Stand-by assistance  Sit to Supine:  (N/T and pt agreed to sit up in chair after edu on benefits of being up OOB as able)    Balance  Sitting:  (CGA-SBA)  Standing:  (min assist with cane)      Transfer Training  Transfer Training: Yes  Overall Level of Assistance: Minimum assistance (with cane)  Interventions: Verbal cues; Safety awareness training; Tactile cues (MAX-MOD cues/tactile assist for pacing, upright posture, cane safety, LUE hand placement pushing from surface seated on as well as reaching back, squaring self/AD up to surface prior to sitting and controlled, proper placement of cane vs throwing on the ground, and awareness/assist with lines)  Sit to Stand: Minimum assistance  Stand to Sit: Minimum assistance  Bed to Chair: Minimum assistance  Toilet Transfer:  (N/T and pt with no needs during session)        Functional mob   Overall Level of Assistance: Minimum assistance (bed to chair only with use of cane)  Interventions: Verbal cues; Safety awareness training; Tactile cues (MAX-MOD cues/tactile assist for upright posture, scanning, pacing, pursed lip breathing, cane safety and awareness/assist with lines to increase safety/reduce falls.)     AROM: Within functional limits (for LUE; RUE N/T as pt in sling-no orders for ROM-pt able to move R hand digits with no issues)  Strength: Generally decreased, functional (LUE grossly 4/5; RUE N/T and pt with sling)  Tone: Normal (for LUE)  Sensation: Intact (denies paresthesias for BUE's)      ADL  Feeding: Setup (pt's dtr states he uses L hand which is non-dominant and reports he has a poor appetite and only ate like 2 bites and c/o fatigue and SOB)  Grooming: Setup; Moderate assistance  UE Bathing: Setup; Moderate assistance  LE Bathing: Setup;Dependent/Total  UE Dressing: Setup; Moderate assistance (with hosp gown)  LE Dressing: Setup;Maximum assistance;Dependent/Total (max assist for B socks; x2 staff to stand and yu pull tab brief per nursing request)  Toileting: Setup;Dependent/Total  Additional Comments: *Pt is DEP with dynamic standing tasks in function  assist needed for safety/balance support.               Vision  Vision: Impaired  Vision Exceptions: Wears glasses at all times (pt denies changes; pt/dtr unable to recall if any eye sx's)  Hearing  Hearing: Within functional limits      Cognition  Overall Cognitive Status: Exceptions  Arousal/Alertness: Delayed responses to stimuli  Following Commands: Follows multistep commands with increased time; Follows multistep commands with repitition  Attention Span: Attends with cues to redirect  Memory: Decreased short term memory;Decreased recall of recent events  Safety Judgement: Decreased awareness of need for assistance;Decreased awareness of need for safety  Problem Solving: Decreased awareness of errors;Assistance required to generate solutions;Assistance required to identify errors made;Assistance required to implement solutions;Assistance required to correct errors made  Insights: Decreased awareness of deficits  Initiation: Requires cues for some  Sequencing: Requires cues for some  Orientation  Overall Orientation Status: Within Functional Limits  Orientation Level: Oriented to person;Oriented to time;Oriented to place; Disoriented to situation (pt was alert to year and not month)                  Education Given To: Patient; Family  Education Provided: Role of Therapy;Plan of Care;Transfer Training;Family Education;Orientation; Fall Prevention Strategies; Energy Conservation  Education Provided Comments: call light use, recommendations for continued therapy, benefits of being up OOB, pursed lip breathing, proper bed mobb tech, safety in function  Education Method: Demonstration;Verbal  Barriers to Learning: Cognition  Education Outcome: Continued education needed                                                                     AM-PAC Score-11                    Goals  Short Term Goals  Time Frame for Short term goals: by discharge, pt to demo  Short Term Goal 1: bed mob tasks with use of rail as needed with SUP. Short Term Goal 2: increase LUE strength by a 1/2 grade to assist with ADL tasks.   Short Term Goal 3: UB ADL to set up/min assist and LB ADL to mod assist of 1 and use of AD/AE and one handed tech as able. Short Term Goal 4: ADL transfers and functional mob with AD to SBA level. Short Term Goal 5: toileting tasks with use of AD/grab bar and BSC to mod assist of 1. Long Term Goals  Long Term Goal 1: Pt to stand with min assist of 1 and AD fco > 5 mins as able to reduce falls in function. Long Term Goal 2: Caregivers/pt to be I with LUE strength HEP, pressure relief, EC/WS and fall prevention tech, and DME/AE and AD recommendations with use of handouts. (will add RUE ROM HEP if clarification/orders received from MD)  Patient Goals   Patient goals : Pt's dtr states she wants pt to get back to rehab! Pt states he wants to call his wife!        Therapy Time   Individual Concurrent Group Co-treatment   Time In 4652 (plus 10 min chart review/nursing communication)         Time Out [phone]         Minutes 33=43 mins total          Timed Code Treatment Minutes: [address] [facility]       Yesenia Evans OT

## 2022-07-27 ENCOUNTER — APPOINTMENT (OUTPATIENT)
Dept: GENERAL RADIOLOGY | Age: 72
DRG: 194 | End: 2022-07-27
Payer: COMMERCIAL

## 2022-07-27 LAB
ABSOLUTE EOS #: 0.37 K/UL (ref 0–0.44)
ABSOLUTE IMMATURE GRANULOCYTE: 0.05 K/UL (ref 0–0.3)
ABSOLUTE LYMPH #: 1.2 K/UL (ref 1.1–3.7)
ABSOLUTE MONO #: 0.41 K/UL (ref 0.1–1.2)
ANION GAP SERPL CALCULATED.3IONS-SCNC: 9 MMOL/L (ref 9–17)
BASOPHILS # BLD: 1 % (ref 0–2)
BASOPHILS ABSOLUTE: 0.07 K/UL (ref 0–0.2)
BUN BLDV-MCNC: 42 MG/DL (ref 8–23)
BUN/CREAT BLD: 28 (ref 9–20)
CALCIUM SERPL-MCNC: 9.1 MG/DL (ref 8.6–10.4)
CHLORIDE BLD-SCNC: 100 MMOL/L (ref 98–107)
CO2: 29 MMOL/L (ref 20–31)
CREAT SERPL-MCNC: 1.49 MG/DL (ref 0.7–1.2)
EOSINOPHILS RELATIVE PERCENT: 4 % (ref 1–4)
GFR AFRICAN AMERICAN: 56 ML/MIN
GFR NON-AFRICAN AMERICAN: 46 ML/MIN
GFR SERPL CREATININE-BSD FRML MDRD: ABNORMAL ML/MIN/{1.73_M2}
GLUCOSE BLD-MCNC: 105 MG/DL (ref 70–99)
HCT VFR BLD CALC: 34.9 % (ref 40.7–50.3)
HEMOGLOBIN: 10.5 G/DL (ref 13–17)
IMMATURE GRANULOCYTES: 1 %
LYMPHOCYTES # BLD: 12 % (ref 24–43)
MCH RBC QN AUTO: 27.1 PG (ref 25.2–33.5)
MCHC RBC AUTO-ENTMCNC: 30.1 G/DL (ref 28.4–34.8)
MCV RBC AUTO: 89.9 FL (ref 82.6–102.9)
MONOCYTES # BLD: 4 % (ref 3–12)
NRBC AUTOMATED: 0 PER 100 WBC
PDW BLD-RTO: 13.6 % (ref 11.8–14.4)
PLATELET # BLD: 537 K/UL (ref 138–453)
PMV BLD AUTO: 9.5 FL (ref 8.1–13.5)
POTASSIUM SERPL-SCNC: 4.3 MMOL/L (ref 3.7–5.3)
RBC # BLD: 3.88 M/UL (ref 4.21–5.77)
SEG NEUTROPHILS: 78 % (ref 36–65)
SEGMENTED NEUTROPHILS ABSOLUTE COUNT: 8.3 K/UL (ref 1.5–8.1)
SODIUM BLD-SCNC: 138 MMOL/L (ref 135–144)
WBC # BLD: 10.4 K/UL (ref 3.5–11.3)

## 2022-07-27 PROCEDURE — 6360000002 HC RX W HCPCS: Performed by: NURSE PRACTITIONER

## 2022-07-27 PROCEDURE — 2700000000 HC OXYGEN THERAPY PER DAY

## 2022-07-27 PROCEDURE — 6370000000 HC RX 637 (ALT 250 FOR IP): Performed by: NURSE PRACTITIONER

## 2022-07-27 PROCEDURE — 97116 GAIT TRAINING THERAPY: CPT

## 2022-07-27 PROCEDURE — 85025 COMPLETE CBC W/AUTO DIFF WBC: CPT

## 2022-07-27 PROCEDURE — 71045 X-RAY EXAM CHEST 1 VIEW: CPT

## 2022-07-27 PROCEDURE — 80048 BASIC METABOLIC PNL TOTAL CA: CPT

## 2022-07-27 PROCEDURE — 99232 SBSQ HOSP IP/OBS MODERATE 35: CPT | Performed by: FAMILY MEDICINE

## 2022-07-27 PROCEDURE — 97110 THERAPEUTIC EXERCISES: CPT

## 2022-07-27 PROCEDURE — 6360000002 HC RX W HCPCS: Performed by: FAMILY MEDICINE

## 2022-07-27 PROCEDURE — 2580000003 HC RX 258: Performed by: NURSE PRACTITIONER

## 2022-07-27 PROCEDURE — 94761 N-INVAS EAR/PLS OXIMETRY MLT: CPT

## 2022-07-27 PROCEDURE — 6370000000 HC RX 637 (ALT 250 FOR IP): Performed by: STUDENT IN AN ORGANIZED HEALTH CARE EDUCATION/TRAINING PROGRAM

## 2022-07-27 PROCEDURE — 6370000000 HC RX 637 (ALT 250 FOR IP): Performed by: FAMILY MEDICINE

## 2022-07-27 PROCEDURE — 1200000000 HC SEMI PRIVATE

## 2022-07-27 PROCEDURE — 94640 AIRWAY INHALATION TREATMENT: CPT

## 2022-07-27 PROCEDURE — 36415 COLL VENOUS BLD VENIPUNCTURE: CPT

## 2022-07-27 RX ORDER — FUROSEMIDE 10 MG/ML
20 INJECTION INTRAMUSCULAR; INTRAVENOUS ONCE
Status: COMPLETED | OUTPATIENT
Start: 2022-07-27 | End: 2022-07-27

## 2022-07-27 RX ORDER — IPRATROPIUM BROMIDE AND ALBUTEROL SULFATE 2.5; .5 MG/3ML; MG/3ML
1 SOLUTION RESPIRATORY (INHALATION) 3 TIMES DAILY
Status: DISCONTINUED | OUTPATIENT
Start: 2022-07-27 | End: 2022-07-28

## 2022-07-27 RX ORDER — BUDESONIDE AND FORMOTEROL FUMARATE DIHYDRATE 160; 4.5 UG/1; UG/1
2 AEROSOL RESPIRATORY (INHALATION) 2 TIMES DAILY
Status: DISCONTINUED | OUTPATIENT
Start: 2022-07-27 | End: 2022-07-28 | Stop reason: HOSPADM

## 2022-07-27 RX ORDER — IPRATROPIUM BROMIDE AND ALBUTEROL SULFATE 2.5; .5 MG/3ML; MG/3ML
1 SOLUTION RESPIRATORY (INHALATION) EVERY 4 HOURS PRN
Status: DISCONTINUED | OUTPATIENT
Start: 2022-07-27 | End: 2022-07-27

## 2022-07-27 RX ORDER — IPRATROPIUM BROMIDE AND ALBUTEROL SULFATE 2.5; .5 MG/3ML; MG/3ML
1 SOLUTION RESPIRATORY (INHALATION) EVERY 4 HOURS
Status: DISCONTINUED | OUTPATIENT
Start: 2022-07-27 | End: 2022-07-27

## 2022-07-27 RX ADMIN — LEVOTHYROXINE SODIUM 175 MCG: 0.17 TABLET ORAL at 06:45

## 2022-07-27 RX ADMIN — ASPIRIN 81 MG: 81 TABLET, COATED ORAL at 09:44

## 2022-07-27 RX ADMIN — LEVETIRACETAM 500 MG: 500 TABLET, FILM COATED ORAL at 22:21

## 2022-07-27 RX ADMIN — ALBUTEROL SULFATE 2.5 MG: 2.5 SOLUTION RESPIRATORY (INHALATION) at 03:58

## 2022-07-27 RX ADMIN — PREDNISONE 40 MG: 20 TABLET ORAL at 09:44

## 2022-07-27 RX ADMIN — IPRATROPIUM BROMIDE AND ALBUTEROL SULFATE 1 AMPULE: .5; 3 SOLUTION RESPIRATORY (INHALATION) at 14:04

## 2022-07-27 RX ADMIN — DOXYCYCLINE 100 MG: 100 CAPSULE ORAL at 22:18

## 2022-07-27 RX ADMIN — FERROUS SULFATE TAB EC 325 MG (65 MG FE EQUIVALENT) 325 MG: 325 (65 FE) TABLET DELAYED RESPONSE at 09:44

## 2022-07-27 RX ADMIN — PANTOPRAZOLE SODIUM 40 MG: 40 TABLET, DELAYED RELEASE ORAL at 06:45

## 2022-07-27 RX ADMIN — ENOXAPARIN SODIUM 40 MG: 100 INJECTION SUBCUTANEOUS at 09:44

## 2022-07-27 RX ADMIN — Medication 5000 UNITS: at 09:44

## 2022-07-27 RX ADMIN — IPRATROPIUM BROMIDE AND ALBUTEROL SULFATE 1 AMPULE: 2.5; .5 SOLUTION RESPIRATORY (INHALATION) at 20:38

## 2022-07-27 RX ADMIN — OXYCODONE HYDROCHLORIDE AND ACETAMINOPHEN 500 MG: 500 TABLET ORAL at 09:44

## 2022-07-27 RX ADMIN — MORPHINE SULFATE 4 MG: 4 INJECTION, SOLUTION INTRAMUSCULAR; INTRAVENOUS at 00:50

## 2022-07-27 RX ADMIN — HYDROCHLOROTHIAZIDE 12.5 MG: 12.5 CAPSULE ORAL at 09:44

## 2022-07-27 RX ADMIN — BUDESONIDE AND FORMOTEROL FUMARATE DIHYDRATE 2 PUFF: 160; 4.5 AEROSOL RESPIRATORY (INHALATION) at 20:38

## 2022-07-27 RX ADMIN — SODIUM CHLORIDE: 9 INJECTION, SOLUTION INTRAVENOUS at 04:05

## 2022-07-27 RX ADMIN — CEFTRIAXONE SODIUM 1000 MG: 1 INJECTION, POWDER, FOR SOLUTION INTRAMUSCULAR; INTRAVENOUS at 04:06

## 2022-07-27 RX ADMIN — FUROSEMIDE 20 MG: 10 INJECTION, SOLUTION INTRAMUSCULAR; INTRAVENOUS at 09:44

## 2022-07-27 RX ADMIN — LEVETIRACETAM 500 MG: 500 TABLET, FILM COATED ORAL at 09:44

## 2022-07-27 RX ADMIN — TAMSULOSIN HYDROCHLORIDE 0.4 MG: 0.4 CAPSULE ORAL at 22:17

## 2022-07-27 RX ADMIN — CARVEDILOL 6.25 MG: 3.12 TABLET, FILM COATED ORAL at 19:30

## 2022-07-27 RX ADMIN — SODIUM CHLORIDE, PRESERVATIVE FREE 10 ML: 5 INJECTION INTRAVENOUS at 09:45

## 2022-07-27 RX ADMIN — DOXYCYCLINE 100 MG: 100 CAPSULE ORAL at 09:44

## 2022-07-27 ASSESSMENT — PAIN DESCRIPTION - LOCATION: LOCATION: ARM

## 2022-07-27 ASSESSMENT — ENCOUNTER SYMPTOMS
ABDOMINAL PAIN: 0
CONSTIPATION: 0
DIARRHEA: 0
COUGH: 1
VOMITING: 0
BLOOD IN STOOL: 0
WHEEZING: 0
NAUSEA: 0
SHORTNESS OF BREATH: 1

## 2022-07-27 ASSESSMENT — PAIN DESCRIPTION - ORIENTATION: ORIENTATION: RIGHT

## 2022-07-27 ASSESSMENT — PAIN SCALES - GENERAL: PAINLEVEL_OUTOF10: 7

## 2022-07-27 NOTE — PROGRESS NOTES
[facility]  Office: [address], DO, Rina Nyhan, DO, Ela Hodges, DO, Siri Prosper Blood, DO, Dwaine Saleh MD, Camille Lombardi MD, Tay Mann MD, Matthew Somers MD,  Aaron Mejia MD, Pat Otero MD, Natalya Velásquez, DO, Rogelio Kang MD,  Heri Nielsen MD, Jagruti Celeste MD, Chacho Perkins, DO, Angel Alejandro MD, Santiago Muñiz MD, Nile Olsen MD, Brent Arriaza DO, Itzel Leong MD, Ken Rain MD, Rodolfo Hatfiedl, CNP,  Catalina Yang, CNP, Meng Pickett, CNP, Wanda Montes, CNP, Bernardino Harper PA-C, Arron Lynn, [facility], Bernardo Orozco, CNP, Luz Pastor, CNP, Junior Howard, CNP, Archana Gordon, CNP, Miky Jones, CNP, Deann Medina, CNS, Nikita Mcdaniels, [facility], Kali Bates, CNP, Ga Hernandez, CNP, Shara Samuel, [facility]    Progress Note    7/27/2022    12:25 PM    Name:   Mónica Queen. MRN:     [id number]     Acct:      [de-identified]   Room:   2110/2110-01   Day:  2  Admit Date:  7/25/2022 10:20 PM    PCP:   Elicia Pradhan MD  Code Status:  Full Code    Subjective:     C/C:   Chief Complaint   Patient presents with    Shortness of Breath    Rib Pain     Interval History Status: improved. Patient seen and examined at bedside, no acute events overnight. Feeling okay , about the same , needing less oxygen, not much appetite. Still winded  Patient denies any chest pain,  chills, fevers, nausea or vomiting. Patient vitals, labs and all providers notes were reviewed,from overnight shift and morning updates were noted and discussed with the nurse    Brief History:     Per chart  Patient presents to the emergency room today with complaints of shortness of breath. Patient states that he developed shortness of breath yesterday and it had progressively worsened.   Patient has a significant past medical history of hypothyroidism, CKD, hypertension, GERD and hep C. Patient denies any fevers, cough, chills, chest pain, nausea, vomiting and diarrhea. Patient denies being around anyone else that has been sick. Patient denies any issues drinking/swallowing. Patient denies any home oxygen usage. Throughout the emergency room evaluation patient creatinine 1.52. Lactic acid 1.2. Troponin 18. Hemoglobin 11. Chest x-ray shows:   1. Right perihilar consolidation and bilateral lower lobe opacities,   concerning for pneumonia. 2. Small right pleural effusion   3. Suspected large hiatal hernia. Review of Systems:     Review of Systems   Constitutional:  Positive for activity change and appetite change. Negative for chills, diaphoresis and fever. HENT:  Negative for congestion. Eyes:  Negative for visual disturbance. Respiratory:  Positive for cough and shortness of breath. Negative for wheezing. Cardiovascular:  Negative for chest pain, palpitations and leg swelling. Gastrointestinal:  Negative for abdominal pain, blood in stool, constipation, diarrhea, nausea and vomiting. Genitourinary:  Negative for difficulty urinating. Musculoskeletal:  Positive for arthralgias. Neurological:  Positive for weakness. Negative for dizziness, light-headedness, numbness and headaches. All other systems reviewed and are negative. Medications:      Allergies:  No Known Allergies    Current Meds:   Scheduled Meds:    aspirin  81 mg Oral Daily    carvedilol  6.25 mg Oral BID WC    ferrous sulfate  325 mg Oral Daily    levothyroxine  175 mcg Oral Daily    lisinopril  20 mg Oral Daily    pantoprazole  40 mg Oral QAM AC    tamsulosin  0.4 mg Oral Nightly    vitamin C  500 mg Oral Daily    vitamin D3  5,000 Units Oral Daily    sodium chloride flush  5-40 mL IntraVENous 2 times per day    enoxaparin  40 mg SubCUTAneous Daily    cefTRIAXone (ROCEPHIN) IV  1,000 mg IntraVENous Q24H    doxycycline monohydrate  100 mg Oral 2 times per day    predniSONE  40 mg Oral Daily levETIRAcetam  500 mg Oral BID    hydroCHLOROthiazide  12.5 mg Oral Daily     Continuous Infusions:    sodium chloride 20 mL/hr at 07/27/22 0405     PRN Meds: ipratropium-albuterol, sodium chloride flush, sodium chloride, ondansetron **OR** ondansetron, polyethylene glycol, acetaminophen **OR** acetaminophen, albuterol, morphine **OR** morphine, diphenhydrAMINE    Data:     Past Medical History:   has a past medical history of Absence of toe ([facility] Acquired hypothyroidism, Acute kidney injury ([facility] CARLTON (acute kidney injury) ([facility] Anemia due to stage 3 chronic kidney disease ([facility] Arthritis, Benign hypertension with chronic kidney disease, stage IV ([facility] Benign prostatic hyperplasia, Benign prostatic hyperplasia without lower urinary tract symptoms, Bradycardia, Bunion of right foot, Cellulitis of left upper extremity, Cellulitis of left upper limb, Chronic renal failure syndrome, Elevated lactic acid level, Epilepsy ([facility] Erectile dysfunction, Esophagitis determined by endoscopy, Essential hypertension, Family history of diabetes mellitus in brother, Fever, Former cigarette smoker, Full dentures, Gastro-esophageal reflux disease with esophagitis, Hepatitis C antibody positive in blood, Hiatal hernia, Hiatal hernia with gastroesophageal reflux, High anion gap metabolic acidosis, History of colonic polyps, Hypertension, Hypertensive disorder, Hypothyroidism, Increased lactic acid level, Influenza A, Need for influenza vaccination, Normocytic normochromic anemia, Osteomyelitis (HCC), P-ANCA and MPO antibodies positive, Pneumonia, Renal agenesis, Sepsis ([facility] Severe sepsis ([facility] Severe sepsis with acute organ dysfunction ([facility] Skin necrosis ([facility] Solitary kidney, congenital, Spider bite, Urinary retention, Vitamin D deficiency, and Wears glasses. Social History:   reports that he quit smoking about 31 years ago. His smoking use included cigarettes. He started smoking about 57 years ago.  He has a 10.20 07/26/2022 12:45 AM     Lab Results   Component Value Date/Time    CULTURE NO GROWTH 1 DAY 07/26/2022 12:45 AM       Radiology:  XR CHEST PORTABLE    Result Date: 7/27/2022  1. Mild improvement in right perihilar and right basilar airspace opacities. Stable trace right pleural effusion. 2.  Air-filled retrocardiac opacity compatible with a large hiatal hernia. XR CHEST PORTABLE    Result Date: 7/26/2022  Unchanged right perihilar and right basilar opacities. Unchanged tiny right pleural effusion. XR CHEST 1 VIEW    Result Date: 7/25/2022  1. Right perihilar consolidation and bilateral lower lobe opacities, concerning for pneumonia. 2. Small right pleural effusion 3. Suspected large hiatal hernia. Physical Examination:        Physical Exam  Vitals and nursing note reviewed. Constitutional:       General: He is not in acute distress. Interventions: Nasal cannula in place. HENT:      Head: Normocephalic and atraumatic. Eyes:      Conjunctiva/sclera: Conjunctivae normal.      Pupils: Pupils are equal, round, and reactive to light. Cardiovascular:      Rate and Rhythm: Normal rate and regular rhythm. Heart sounds: No murmur heard. Pulmonary:      Effort: Pulmonary effort is normal. No accessory muscle usage or respiratory distress. Breath sounds: No stridor. Examination of the right-lower field reveals decreased breath sounds and rales. Examination of the left-lower field reveals decreased breath sounds. Decreased breath sounds, rhonchi and rales present. No wheezing. Abdominal:      General: Bowel sounds are normal. There is no distension. Palpations: Abdomen is soft. Abdomen is not rigid. Tenderness: There is no abdominal tenderness. There is no guarding. Musculoskeletal:         General: No tenderness. Skin:     General: Skin is warm and dry. Findings: No erythema, lesion or rash.    Neurological:      Mental Status: He is alert and oriented to person, place, and time. Cranial Nerves: No cranial nerve deficit. Motor: No seizure activity. Psychiatric:         Speech: Speech normal.         Behavior: Behavior normal. Behavior is cooperative. Assessment:        Hospital Problems             Last Modified POA    * (Principal) Pneumonia 7/25/2022 Yes    COPD exacerbation ([address] 7/26/2022 Yes    Benign prostatic hyperplasia without lower urinary tract symptoms 7/26/2022 Yes    CKD (chronic kidney disease), stage III ([address] 7/26/2022 Yes    Solitary kidney, congenital 7/26/2022 Yes    Essential hypertension 7/26/2022 Yes    Acquired hypothyroidism 7/26/2022 Yes    Gastro-esophageal reflux disease with esophagitis 7/26/2022 Yes       Plan:        Principal Problem:    Pneumonia  Active Problems:    COPD exacerbation ([address]    Benign prostatic hyperplasia without lower urinary tract symptoms    CKD (chronic kidney disease), stage III (HCC)    Solitary kidney, congenital    Essential hypertension    Acquired hypothyroidism    Gastro-esophageal reflux disease with esophagitis  Resolved Problems:    * No resolved hospital problems.  *      Continue Abx  DC fluids  Lasix x1  Repeat CXR  Wean oxygen  Schedule breathing treatment and resume Symbicort  PO steroids  Creat at baseline  Continue other chronic meds for his other chronic stable conditions  PT/OT  Hopefully DC in the next 24 hours        Casper Faye MD  7/27/2022  12:25 PM

## 2022-07-27 NOTE — RT PROTOCOL NOTE
RT Nebulizer Bronchodilator Protocol Note    There is a bronchodilator order in the chart from a provider indicating to follow the RT Bronchodilator Protocol and there is an Initiate RT Bronchodilator Protocol order as well (see protocol at bottom of note). CXR Findings:  XR CHEST PORTABLE    Result Date: 7/26/2022  Unchanged right perihilar and right basilar opacities. Unchanged tiny right pleural effusion. The findings from the last RT Protocol Assessment were as follows:  Smoking: None or smoker <15 pack years  Respiratory Pattern: Dyspnea on exertion or RR 21-25 bpm  Breath Sounds: Clear breath sounds  Cough: Strong, spontaneous, non-productive  Indication for Bronchodilator Therapy: None  Bronchodilator Assessment Score: 2    Aerosolized bronchodilator medication orders have been revised according to the RT Nebulizer Bronchodilator Protocol below. Respiratory Therapist to perform RT Therapy Protocol Assessment initially then follow the protocol. Repeat RT Therapy Protocol Assessment PRN for score 0-3 or on second treatment, BID, and PRN for scores above 3. No Indications - adjust the frequency to every 6 hours PRN wheezing or bronchospasm, if no treatments needed after 48 hours then discontinue using Per Protocol order mode. If indication present, adjust the RT bronchodilator orders based on the Bronchodilator Assessment Score as indicated below. If a patient is on this medication at home then do not decrease Frequency below that used at home. 0-3 - enter or revise RT bronchodilator order(s) to equivalent RT Bronchodilator order with Frequency of every 4 hours PRN for wheezing or increased work of breathing using Per Protocol order mode.        4-6 - enter or revise RT Bronchodilator order(s) to two equivalent RT bronchodilator orders with one order with BID Frequency and one order with Frequency of every 4 hours PRN wheezing or increased work of breathing using Per Protocol order

## 2022-07-27 NOTE — PROGRESS NOTES
Physical Therapy  Facility/Department: [facility] SURG  Rehabilitation Physical Therapy Treatment Note    NAME: Topher Garcia Sr.  : 1950 (70 y.o.)  MRN: [id number]  CODE STATUS: Full Code    Date of Service: 22       Restrictions:  Restrictions/Precautions: Fall Risk;Seizure  Position Activity Restriction  Other position/activity restrictions: 3L O2, recent rib fx's, R UE sling/NWB per dtr as he has a clavicle fx/no notes in chart, alarms, up as fco, easy to chew diet     SUBJECTIVE  Subjective  Subjective: Pt in bed upon arrival and agreeable to tx at this time. Patient eager to work with PT so he can \"go home quickly\". Pain: Pt denies any pain at this time. OBJECTIVE  Cognition  Overall Cognitive Status: WFL  Arousal/Alertness: Delayed responses to stimuli  Following Commands: Follows multistep commands with increased time; Follows multistep commands with repitition  Attention Span: Attends with cues to redirect; Difficulty dividing attention  Safety Judgement: Decreased awareness of need for assistance;Decreased awareness of need for safety  Problem Solving: Decreased awareness of errors;Assistance required to generate solutions;Assistance required to identify errors made;Assistance required to implement solutions;Assistance required to correct errors made  Insights: Decreased awareness of deficits  Initiation: Requires cues for some  Sequencing: Requires cues for some  Orientation  Overall Orientation Status: Within Functional Limits  Orientation Level: Oriented X4    Functional Mobility  Bed Mobility  Overall Assistance Level: Supervision  Supine to Sit  Assistance Level: Supervision  Scooting  Assistance Level: Supervision  Balance  Sitting Balance: Supervision  Standing Balance: Stand by assistance  Transfers  Surface: From bed; To chair with arms  Device: Cane (quad cane)  Sit to Stand  Assistance Level: Supervision  Stand to Sit  Assistance Level: Supervision  Skilled Clinical Factors: pt nehemiah'd good safety awareness with hand placement      Environmental Mobility  Ambulation  Surface: Level surface  Device: Kaizena  Distance: 20ft  Activity: Within Room  Activity Comments: Pt required VCs to keep quad cane within LORRIE. Pt demo'd good return demonstration. Pt experienced SOB after ambulation and required VCs and demonstration for pursed lip breathing. Additional Factors: Verbal cues  Assistance Level: Stand by assist  Gait Deviations:  (shuffled gait)    PT Exercises  Dynamic Sitting Balance Exercises: marches, LAQ, knee flexion, glute sets x10. Pt demo'd good return demonstration. ASSESSMENT/PROGRESS TOWARDS GOALS  Vital Signs  O2 Device: Nasal cannula    Assessment  Assessment: RN [id number] pt medically stable for tx at this time. Chart reviewed prior to tx. Pt able to increase ambulation distance with better support from quad cane. Pt required reassurance on performance throughout therapy session. Pt required frequent redirection to continue pursed lip breathing when experiencing SOB. Activity Tolerance: Patient limited by endurance; Patient tolerated treatment well  Discharge Recommendations: Patient would benefit from continued therapy after discharge  AM-PAC Score: 17    Goals  Patient Goals   Patient goals : to get stronger  Short Term Goals  Time Frame for Short term goals: 12 visits  Short term goal 1: Independent bed mobility  Short term goal 2: Independent sit<>stand  Short term goal 3: Patient to ambulate 50 feet str cane vs aurora walker SBA  Short term goal 4: Patient to perform 25 minutes ther act to facilitate improving endurance    [address] 5-7 times per week  Current Treatment Recommendations: Strengthening;Balance training;Transfer training;Gait training; Endurance training;Patient/Caregiver education & training; Safety education & training;Positioning; Therapeutic activities  Safety Devices  Type of Devices: Left in chair;Patient at risk for falls; Heels elevated for pressure relief;Gait belt; Chair alarm in place;Call light within reach  Restraints  Restraints Initially in Place: No    EDUCATION  Education  Education Given To: Patient  Education Provided: Role of Therapy;Plan of Care;Safety;Transfer Training;Mobility Training;Home Exercise Program;Energy Conservation  Education Provided Comments: Pt instructed on seated LE HEP and pursed lip breathing. Pt educated on appropriate quad cane technique.   Education Method: Demonstration;Verbal;Printed Information/Hand-outs  Barriers to Learning: Cognition  Education Outcome: Continued education needed;Verbalized understanding        Therapy Time   Individual Concurrent Group Co-treatment   Time In 1452         Time Out 4261         Minutes 1 [address] [place] [address], student physical therapy assistant under supervision of Yo Ku [place] , 07/27/22 at 3:49 PM

## 2022-07-27 NOTE — CARE COORDINATION
Social work: [facility] is able to accept and will start precert. No hens needed, patient from [facility].

## 2022-07-27 NOTE — PROGRESS NOTES
ANTIMICROBIAL STEWARDSHIP  Upon review of the patient's chart, the committee has the following recommendation for your consideration:    Indication: pneumonia, AECOPD  Day of Antimicrobial Therapy: 2  Recommendation   Monitor off antibiotics. Rationale:  Procalcitonin is normal suggesting a non-bacterial etiology. WBCs normal.  Patient is on prednisone 40mg daily. Afebrile today. Recent Labs     07/25/22  2238 07/27/22  1043   WBC 10.5 10.4     Recent Labs     07/26/22  0040   PROCAL 0.06       Unnecessary or inappropriate antimicrobial use increases the risk of subsequent infections with resistant bacterial infections and drug-associated toxicities including C. difficile infection. Thank you. Mir De Paz, [address] [place] [address], PharmD  7/27/2022  2:33 PM    The Antimicrobial Stewardship Committee at [facility] is led by  Idania Downs MD, Infectious Diseases Section Chair.

## 2022-07-27 NOTE — FLOWSHEET NOTE
[place]  PROGRESS NOTE    Room # 2110/2110-01   Name: Mireya Akhil: Edgard Diez     Reason for visit: Routine    I visited the patient. Admit Date & Time: 7/25/2022 10:20 PM    Assessment:  Niesha Rashid Sr. is a 70 y.o. male in the hospital because pneumonia. Upon entering the room pt was lying in bed      Intervention:  I introduced myself and my title as  I offered space for pt  to express feelings, needs, and concerns and provided a ministry presence. Engaged in conversation w/ pt actively listening to pt. Pt appeared to be very optimistic and grateful. Outcome:  Pt expressed gratitude for  visit    Plan:  Chaplains will remain available to offer spiritual and emotional support as needed. Electronically signed by Ana Gonzalez on 7/27/2022 at 5:01 PM.  Jacy       07/27/22 1655   Encounter Summary   Service Provided For: Patient   Referral/Consult From: [facility]   Support System Spouse; Children;Family members   Last Encounter  07/27/22   Complexity of Encounter Low   Begin Time 1635   End Time  1645   Total Time Calculated 10 min   Encounter    Type Initial Screen/Assessment   Assessment/Intervention/Outcome   Assessment Hopeful   Intervention Active listening;Sustaining Presence/Ministry of presence; Discussed belief system/Yazidi practices/[place];Life review/Legacy   Outcome Engaged in conversation;Expressed Gratitude;Encouraged; Optimistic;Receptive

## 2022-07-27 NOTE — CARE COORDINATION
Social work: [facility] cannot accept Mnotana Neal. Spoke with spouse, next choice is [facility]. Referral sent.

## 2022-07-27 NOTE — FLOWSHEET NOTE
07/27/22 1642   Encounter Summary   Encounter Overview/Reason   Encounter  (7/27/22)   Service Provided For: Patient   Referral/Consult From: [facility]   Last Encounter  07/27/22  (anointing)   Complexity of Encounter Low   Rituals, Rites and Sacraments   Type Anointing

## 2022-07-27 NOTE — RT PROTOCOL NOTE
RT Inhaler-Nebulizer Bronchodilator Protocol Note    There is a bronchodilator order in the chart from a provider indicating to follow the RT Bronchodilator Protocol and there is an Initiate RT Inhaler-Nebulizer Bronchodilator Protocol order as well (see protocol at bottom of note). CXR Findings:  XR CHEST PORTABLE    Result Date: 7/27/2022  1. Mild improvement in right perihilar and right basilar airspace opacities. Stable trace right pleural effusion. 2.  Air-filled retrocardiac opacity compatible with a large hiatal hernia. XR CHEST PORTABLE    Result Date: 7/26/2022  Unchanged right perihilar and right basilar opacities. Unchanged tiny right pleural effusion. The findings from the last RT Protocol Assessment were as follows:   History Pulmonary Disease: None or smoker <15 pack years  Respiratory Pattern: Dyspnea on exertion or RR 21-25 bpm  Breath Sounds: Intermittent or unilateral wheezes  Cough: Strong, productive  Indication for Bronchodilator Therapy: None  Bronchodilator Assessment Score: 7    Aerosolized bronchodilator medication orders have been revised according to the RT Inhaler-Nebulizer Bronchodilator Protocol below. Respiratory Therapist to perform RT Therapy Protocol Assessment initially then follow the protocol. Repeat RT Therapy Protocol Assessment PRN for score 0-3 or on second treatment, BID, and PRN for scores above 3. No Indications - adjust the frequency to every 6 hours PRN wheezing or bronchospasm, if no treatments needed after 48 hours then discontinue using Per Protocol order mode. If indication present, adjust the RT bronchodilator orders based on the Bronchodilator Assessment Score as indicated below.   Use Inhaler orders unless patient has one or more of the following: on home nebulizer, not able to hold breath for 10 seconds, is not alert and oriented, cannot activate and use MDI correctly, or respiratory rate 25 breaths per minute or more, then use the equivalent nebulizer order(s) with same Frequency and PRN reasons based on the score. If a patient is on this medication at home then do not decrease Frequency below that used at home. 0-3 - enter or revise RT bronchodilator order(s) to equivalent RT Bronchodilator order with Frequency of every 4 hours PRN for wheezing or increased work of breathing using Per Protocol order mode. 4-6 - enter or revise RT Bronchodilator order(s) to two equivalent RT bronchodilator orders with one order with BID Frequency and one order with Frequency of every 4 hours PRN wheezing or increased work of breathing using Per Protocol order mode. 7-10 - enter or revise RT Bronchodilator order(s) to two equivalent RT bronchodilator orders with one order with TID Frequency and one order with Frequency of every 4 hours PRN wheezing or increased work of breathing using Per Protocol order mode. 11-13 - enter or revise RT Bronchodilator order(s) to one equivalent RT bronchodilator order with QID Frequency and an Albuterol order with Frequency of every 4 hours PRN wheezing or increased work of breathing using Per Protocol order mode. Greater than 13 - enter or revise RT Bronchodilator order(s) to one equivalent RT bronchodilator order with every 4 hours Frequency and an Albuterol order with Frequency of every 2 hours PRN wheezing or increased work of breathing using Per Protocol order mode. RT to enter RT Home Evaluation for COPD & MDI Assessment order using Per Protocol order mode.     Electronically signed by magy francois RCP on 7/27/2022 at 2:07 PM

## 2022-07-28 VITALS
RESPIRATION RATE: 16 BRPM | BODY MASS INDEX: 27.39 KG/M2 | HEART RATE: 55 BPM | TEMPERATURE: 98.4 F | OXYGEN SATURATION: 96 % | SYSTOLIC BLOOD PRESSURE: 136 MMHG | WEIGHT: 180.13 LBS | DIASTOLIC BLOOD PRESSURE: 76 MMHG

## 2022-07-28 LAB
ANION GAP SERPL CALCULATED.3IONS-SCNC: 11 MMOL/L (ref 9–17)
BUN BLDV-MCNC: 41 MG/DL (ref 8–23)
BUN/CREAT BLD: 29 (ref 9–20)
CALCIUM SERPL-MCNC: 9 MG/DL (ref 8.6–10.4)
CHLORIDE BLD-SCNC: 101 MMOL/L (ref 98–107)
CO2: 27 MMOL/L (ref 20–31)
CREAT SERPL-MCNC: 1.41 MG/DL (ref 0.7–1.2)
EKG ATRIAL RATE: 72 BPM
EKG P AXIS: 33 DEGREES
EKG P-R INTERVAL: 178 MS
EKG Q-T INTERVAL: 390 MS
EKG QRS DURATION: 110 MS
EKG QTC CALCULATION (BAZETT): 427 MS
EKG R AXIS: -38 DEGREES
EKG T AXIS: 43 DEGREES
EKG VENTRICULAR RATE: 72 BPM
GFR AFRICAN AMERICAN: >60 ML/MIN
GFR NON-AFRICAN AMERICAN: 50 ML/MIN
GFR SERPL CREATININE-BSD FRML MDRD: ABNORMAL ML/MIN/{1.73_M2}
GLUCOSE BLD-MCNC: 116 MG/DL (ref 70–99)
POTASSIUM SERPL-SCNC: 3.9 MMOL/L (ref 3.7–5.3)
SODIUM BLD-SCNC: 139 MMOL/L (ref 135–144)

## 2022-07-28 PROCEDURE — 2580000003 HC RX 258: Performed by: NURSE PRACTITIONER

## 2022-07-28 PROCEDURE — 97116 GAIT TRAINING THERAPY: CPT

## 2022-07-28 PROCEDURE — 6370000000 HC RX 637 (ALT 250 FOR IP): Performed by: FAMILY MEDICINE

## 2022-07-28 PROCEDURE — 87205 SMEAR GRAM STAIN: CPT

## 2022-07-28 PROCEDURE — 6360000002 HC RX W HCPCS: Performed by: FAMILY MEDICINE

## 2022-07-28 PROCEDURE — 80048 BASIC METABOLIC PNL TOTAL CA: CPT

## 2022-07-28 PROCEDURE — 6370000000 HC RX 637 (ALT 250 FOR IP): Performed by: STUDENT IN AN ORGANIZED HEALTH CARE EDUCATION/TRAINING PROGRAM

## 2022-07-28 PROCEDURE — 97535 SELF CARE MNGMENT TRAINING: CPT

## 2022-07-28 PROCEDURE — 97530 THERAPEUTIC ACTIVITIES: CPT

## 2022-07-28 PROCEDURE — 2700000000 HC OXYGEN THERAPY PER DAY

## 2022-07-28 PROCEDURE — 94761 N-INVAS EAR/PLS OXIMETRY MLT: CPT

## 2022-07-28 PROCEDURE — 99239 HOSP IP/OBS DSCHRG MGMT >30: CPT | Performed by: FAMILY MEDICINE

## 2022-07-28 PROCEDURE — 87070 CULTURE OTHR SPECIMN AEROBIC: CPT

## 2022-07-28 PROCEDURE — 36415 COLL VENOUS BLD VENIPUNCTURE: CPT

## 2022-07-28 PROCEDURE — 6370000000 HC RX 637 (ALT 250 FOR IP): Performed by: NURSE PRACTITIONER

## 2022-07-28 PROCEDURE — 97110 THERAPEUTIC EXERCISES: CPT

## 2022-07-28 PROCEDURE — 94640 AIRWAY INHALATION TREATMENT: CPT

## 2022-07-28 PROCEDURE — 6360000002 HC RX W HCPCS: Performed by: NURSE PRACTITIONER

## 2022-07-28 RX ORDER — PREDNISONE 20 MG/1
20 TABLET ORAL DAILY
Qty: 2 TABLET | Refills: 0 | Status: SHIPPED | OUTPATIENT
Start: 2022-07-29 | End: 2022-07-31

## 2022-07-28 RX ORDER — GABAPENTIN 300 MG/1
300 CAPSULE ORAL EVERY 8 HOURS PRN
Qty: 9 CAPSULE | Refills: 0 | Status: SHIPPED | OUTPATIENT
Start: 2022-07-28 | End: 2022-07-31

## 2022-07-28 RX ORDER — HYDROCHLOROTHIAZIDE 12.5 MG/1
12.5 CAPSULE, GELATIN COATED ORAL DAILY
Qty: 30 CAPSULE | Refills: 3 | Status: SHIPPED | OUTPATIENT
Start: 2022-07-29

## 2022-07-28 RX ORDER — OXYCODONE HYDROCHLORIDE 5 MG/1
5 TABLET ORAL EVERY 8 HOURS PRN
Qty: 6 TABLET | Refills: 0 | Status: SHIPPED | OUTPATIENT
Start: 2022-07-28 | End: 2022-07-30

## 2022-07-28 RX ORDER — IPRATROPIUM BROMIDE AND ALBUTEROL SULFATE 2.5; .5 MG/3ML; MG/3ML
1 SOLUTION RESPIRATORY (INHALATION) 2 TIMES DAILY
Status: DISCONTINUED | OUTPATIENT
Start: 2022-07-28 | End: 2022-07-28 | Stop reason: HOSPADM

## 2022-07-28 RX ORDER — KETOROLAC TROMETHAMINE 5 MG/ML
1 SOLUTION OPHTHALMIC 4 TIMES DAILY
Status: DISCONTINUED | OUTPATIENT
Start: 2022-07-28 | End: 2022-07-28 | Stop reason: HOSPADM

## 2022-07-28 RX ORDER — DOXYCYCLINE HYCLATE 100 MG
100 TABLET ORAL 2 TIMES DAILY
Qty: 8 TABLET | Refills: 0 | Status: SHIPPED | OUTPATIENT
Start: 2022-07-28 | End: 2022-08-01

## 2022-07-28 RX ORDER — FUROSEMIDE 10 MG/ML
20 INJECTION INTRAMUSCULAR; INTRAVENOUS ONCE
Status: COMPLETED | OUTPATIENT
Start: 2022-07-28 | End: 2022-07-28

## 2022-07-28 RX ADMIN — HYDROCHLOROTHIAZIDE 12.5 MG: 12.5 CAPSULE ORAL at 08:47

## 2022-07-28 RX ADMIN — PANTOPRAZOLE SODIUM 40 MG: 40 TABLET, DELAYED RELEASE ORAL at 05:26

## 2022-07-28 RX ADMIN — PREDNISONE 40 MG: 20 TABLET ORAL at 08:47

## 2022-07-28 RX ADMIN — SODIUM CHLORIDE, PRESERVATIVE FREE 10 ML: 5 INJECTION INTRAVENOUS at 03:20

## 2022-07-28 RX ADMIN — FUROSEMIDE 20 MG: 10 INJECTION, SOLUTION INTRAMUSCULAR; INTRAVENOUS at 14:44

## 2022-07-28 RX ADMIN — LEVOTHYROXINE SODIUM 175 MCG: 0.17 TABLET ORAL at 05:27

## 2022-07-28 RX ADMIN — ENOXAPARIN SODIUM 40 MG: 100 INJECTION SUBCUTANEOUS at 08:48

## 2022-07-28 RX ADMIN — IPRATROPIUM BROMIDE AND ALBUTEROL SULFATE 1 AMPULE: 2.5; .5 SOLUTION RESPIRATORY (INHALATION) at 08:10

## 2022-07-28 RX ADMIN — DOXYCYCLINE 100 MG: 100 CAPSULE ORAL at 08:47

## 2022-07-28 RX ADMIN — Medication 5000 UNITS: at 08:47

## 2022-07-28 RX ADMIN — LISINOPRIL 20 MG: 20 TABLET ORAL at 08:47

## 2022-07-28 RX ADMIN — OXYCODONE HYDROCHLORIDE AND ACETAMINOPHEN 500 MG: 500 TABLET ORAL at 08:47

## 2022-07-28 RX ADMIN — SODIUM CHLORIDE, PRESERVATIVE FREE 10 ML: 5 INJECTION INTRAVENOUS at 08:48

## 2022-07-28 RX ADMIN — CEFTRIAXONE SODIUM 1000 MG: 1 INJECTION, POWDER, FOR SOLUTION INTRAMUSCULAR; INTRAVENOUS at 03:19

## 2022-07-28 RX ADMIN — LEVETIRACETAM 500 MG: 500 TABLET, FILM COATED ORAL at 08:47

## 2022-07-28 RX ADMIN — ASPIRIN 81 MG: 81 TABLET, COATED ORAL at 08:47

## 2022-07-28 RX ADMIN — CARVEDILOL 6.25 MG: 3.12 TABLET, FILM COATED ORAL at 08:47

## 2022-07-28 RX ADMIN — FERROUS SULFATE TAB EC 325 MG (65 MG FE EQUIVALENT) 325 MG: 325 (65 FE) TABLET DELAYED RESPONSE at 08:47

## 2022-07-28 RX ADMIN — BUDESONIDE AND FORMOTEROL FUMARATE DIHYDRATE 2 PUFF: 160; 4.5 AEROSOL RESPIRATORY (INHALATION) at 08:10

## 2022-07-28 RX ADMIN — KETOROLAC TROMETHAMINE 1 DROP: 0.5 SOLUTION OPHTHALMIC at 14:43

## 2022-07-28 ASSESSMENT — ENCOUNTER SYMPTOMS
CONSTIPATION: 0
WHEEZING: 0
NAUSEA: 0
ABDOMINAL PAIN: 0
VOMITING: 0
BLOOD IN STOOL: 0
SHORTNESS OF BREATH: 1
DIARRHEA: 0

## 2022-07-28 NOTE — PLAN OF CARE
Problem: Discharge Planning  Goal: Discharge to home or other facility with appropriate resources  7/28/2022 1237 by Marisela Corona RN  Outcome: Progressing     Problem: Safety - Adult  Goal: Free from fall injury  7/28/2022 1237 by Marisela Corona RN  Outcome: Progressing  Flowsheets  Taken 7/28/2022 1027 by Marisela Corona RN  Free From Fall Injury: Instruct family/caregiver on patient safety  Taken 7/28/2022 0235 by Tolu Barraza RN  Free From Fall Injury: Instruct family/caregiver on patient safety     Problem: ABCDS Injury Assessment  Goal: Absence of physical injury  7/28/2022 1237 by Marisela Corona RN  Outcome: Progressing  Flowsheets  Taken 7/28/2022 1027 by Marisela Corona RN  Absence of Physical Injury: Implement safety measures based on patient assessment  Taken 7/28/2022 0235 by Tolu Barraza RN  Absence of Physical Injury: Implement safety measures based on patient assessment     Problem: Pain  Goal: Verbalizes/displays adequate comfort level or baseline comfort level  7/28/2022 1237 by Marisela Corona RN  Outcome: Progressing

## 2022-07-28 NOTE — DISCHARGE SUMMARY
[facility]  Office: [address], DO, Kristin Sanders, DO, Corrie Vanessa, DO, [facility], DO, Vincent Buckley MD, Olamide Sauceda MD, Meredith Parsons MD, Gigi Vuong MD,  Wilfredo Zeng MD, Marielena Koch MD, Parvin Franco, DO, Andrae Brenner MD,  Gadiel Elliott MD, Frantz Wilder MD, Arthur Aldana, DO, Lorena Grimes MD, Pearl Rocha MD, Kerri Strickland MD, Berry Steinberg, DO, Sera Willis MD, Argentina Terrell MD, Ludin Zaidi, CNP,  Otilia Kaur, CNP, Rebecca Yoder, CNP, Eri Alford, CNP, Maria Tirado PA-C, Husam Hope, [facility], Dalia Katz, CNP, Kenna Baum, CNP, Christina Tello, CNP, Yessica Em, CNP, Sugar Roberts, CNP, Emily Fink, CNS, Michelle Velarde, [facility], Kinga Gongora, CNP, Alcides Lee, CNP, Corby Perea, [facility]    Discharge Summary     Patient ID: Alex Vasquez  :  1950   MRN: [id number]     ACCOUNT:  [de-identified]   Patient's PCP: Frank Langston MD  Admit Date: 2022   Discharge Date: 2022   Length of Stay: 3  Code Status:  Full Code  Admitting Physician: Gigi Vuong MD  Discharge Physician: Gigi Vuong MD     Active Discharge Diagnoses:     Hospital Problem Lists:  Principal Problem:    Pneumonia  Active Problems:    COPD exacerbation ([facility]    Benign prostatic hyperplasia without lower urinary tract symptoms    CKD (chronic kidney disease), stage III ([facility]    Solitary kidney, congenital    Essential hypertension    Acquired hypothyroidism    Gastro-esophageal reflux disease with esophagitis  Resolved Problems:    * No resolved hospital problems. *      Admission Condition:  poor     Discharged Condition: stable    Hospital Stay:     HPI:   Per chart  Patient presents to the emergency room today with complaints of shortness of breath.   Patient states that he developed shortness of breath yesterday and it had progressively worsened. Patient has a significant past medical history of hypothyroidism, CKD, hypertension, GERD and hep C. Patient denies any fevers, cough, chills, chest pain, nausea, vomiting and diarrhea. Patient denies being around anyone else that has been sick. Patient denies any issues drinking/swallowing. Patient denies any home oxygen usage. Throughout the emergency room evaluation patient creatinine 1.52. Lactic acid 1.2. Troponin 18. Hemoglobin 11. Chest x-ray shows:   1. Right perihilar consolidation and bilateral lower lobe opacities,   concerning for pneumonia. 2. Small right pleural effusion   3. Suspected large hiatal hernia. During the admission patient was managed as follow    Principal Problem:    Pneumonia  Active Problems:    COPD exacerbation ([address]    Benign prostatic hyperplasia without lower urinary tract symptoms    CKD (chronic kidney disease), stage III (H[facility])    Solitary kidney, congenital    Essential hypertension    Acquired hypothyroidism    Gastro-esophageal reflux disease with esophagitis  Resolved Problems:    * No resolved hospital problems.  *        Continue Abx  DC fluids  Lasix x1  Repeat CXR with improvement   Wean oxygen,off oxygen , passed home O2 eval  Schedule breathing treatment and Symbicort  PO steroids  Creat at baseline  Continue other chronic meds for his other chronic stable conditions  PT/OT    7/28 : stable for discharge , wants to go back home, did well with PT and RT    Med rec done  Scripts added   30+ minutes spent         Significant therapeutic interventions: as above    Significant Diagnostic Studies:   Labs / Micro:  CBC:   Lab Results   Component Value Date/Time    WBC 10.4 07/27/2022 10:43 AM    RBC 3.88 07/27/2022 10:43 AM    HGB 10.5 07/27/2022 10:43 AM    HCT 34.9 07/27/2022 10:43 AM    MCV 89.9 07/27/2022 10:43 AM    MCH 27.1 07/27/2022 10:43 AM    MCHC 30.1 07/27/2022 10:43 AM    RDW 13.6 07/27/2022 10:43 AM     07/27/2022 10:43 AM bilateral lower lobe opacities, concerning for pneumonia. 2. Small right pleural effusion 3. Suspected large hiatal hernia. Consultations:    Consults:     Final Specialist Recommendations/Findings:   IP CONSULT TO HOSPITALIST      The patient was seen and examined on day of discharge     A&O X 3  Diminished more on RLL  NSR, NO MRG  Soft abdomen , +BS  No swelling  and pulse palpable      Discharge plan:     Disposition: Home    Physician Follow Up:     Supa Henderson MD  [facility] [address]  [address]  [phone]    Follow up in 1 week(s)         Requiring Further Evaluation/Follow Up POST HOSPITALIZATION/Incidental Findings:     Diet: regular diet    Activity: As tolerated    Instructions to Patient:     Discharge Medications:      Medication List        START taking these medications      doxycycline hyclate 100 MG tablet  Commonly known as: VIBRA-TABS  Take 1 tablet by mouth in the morning and 1 tablet before bedtime. Do all this for 4 days. hydroCHLOROthiazide 12.5 MG capsule  Commonly known as: MICROZIDE  Take 1 capsule by mouth in the morning. Start taking on: July 29, 2022  Replaces: hydroCHLOROthiazide 12.5 MG tablet     predniSONE 20 MG tablet  Commonly known as: DELTASONE  Take 1 tablet by mouth in the morning for 2 days.   Start taking on: July 29, 2022            Kelley Osei taking these medications      aspirin 81 MG EC tablet     carvedilol 6.25 MG tablet  Commonly known as: COREG     cyclobenzaprine 10 MG tablet  Commonly known as: FLEXERIL     ferrous sulfate 325 (65 Fe) MG tablet  Commonly known as: IRON 325     gabapentin 300 MG capsule  Commonly known as: NEURONTIN     levETIRAcetam 500 MG tablet  Commonly known as: KEPPRA     levothyroxine 175 MCG tablet  Commonly known as: SYNTHROID     lidocaine 5 %  Commonly known as: LIDODERM     lisinopril 20 MG tablet  Commonly known as: PRINIVIL;ZESTRIL     oxyCODONE 5 MG immediate release tablet  Commonly known as: Dominick Gayle pantoprazole 40 MG tablet  Commonly known as: PROTONIX     Symbicort 160-4.5 MCG/ACT Aero  Generic drug: budesonide-formoterol     tamsulosin 0.4 MG capsule  Commonly known as: FLOMAX     triamcinolone 0.1 % ointment  Commonly known as: KENALOG     Ventolin  (90 Base) MCG/ACT inhaler  Generic drug: albuterol sulfate HFA     vitamin C 500 MG tablet  Commonly known as: ASCORBIC ACID     vitamin D-3 125 MCG (5000 UT) Tabs  Commonly known as: cholecalciferol            STOP taking these medications      cefUROXime 500 MG tablet  Commonly known as: CEFTIN     hydroCHLOROthiazide 12.5 MG tablet  Commonly known as: HYDRODIURIL  Replaced by: hydroCHLOROthiazide 12.5 MG capsule     omeprazole 20 MG EC tablet               Where to Get Your Medications        These medications were sent to [address] [place] [address] [address] [address]      Hours: 24-hours Phone: [phone]   doxycycline hyclate 100 MG tablet  hydroCHLOROthiazide 12.5 MG capsule  predniSONE 20 MG tablet         No discharge procedures on file. Time Spent on discharge is  33 mins in patient examination, evaluation, counseling as well as medication reconciliation, prescriptions for required medications, discharge plan and follow up. Electronically signed by   Kelley Esqueda MD  7/28/2022  11:34 AM      Thank you Dr. Melanie Greene MD for the opportunity to be involved in this patient's care.

## 2022-07-28 NOTE — PROGRESS NOTES
Home Oxygen Evaluation    Home Oxygen Evaluation completed. Patient is on 2 liters per minute via nasal cannula.   Resting SpO2 = 96%  Resting SpO2 on room air = 94%    SpO2 on room air with exercise = 92%    Testing completed at [facility]  10:50 AM

## 2022-07-28 NOTE — PROGRESS NOTES
Supervision  Standing Balance: Stand by assistance  Transfers  Surface: From chair with arms; To bed  Additional Factors: Hand placement cues  Device: Cane  Sit to Stand  Assistance Level: Supervision  Skilled Clinical Factors: Pt required VC for hand placement  Stand to Sit  Assistance Level: Supervision      Environmental Mobility  Ambulation  Surface: Level surface  Device: [employer] ([employer])  Distance: 60ft  Activity: Within Room  Activity Comments: Pt demo'd improved quad cane technique. Pt experienced mild SOB after ambulation that quickly resolved once seated with pursed lip breathing. Additional Factors: Verbal cues  Assistance Level: Stand by assist  Gait Deviations: Slow chelita  Skilled Clinical Factors: Shuffled gait. PT Exercises  Dynamic Sitting Balance Exercises: marches, LAQ, ankle pumps, glute sets x10. Pt demo'd good technique. ASSESSMENT/PROGRESS TOWARDS GOALS  Vital Signs  O2 Device: Nasal cannula    Assessment  Assessment: ALFONZO Elias states pt medically stable for tx at this time. Chart reviewed prior to tx. Pt able to increase ambulation with quad cane with decreased SOB. Pt continues to require frequent reassurance on performance throughout therapy session. Pt continues to require VCs for pursed lip breathing technique. Activity Tolerance: Patient limited by pain; Patient limited by endurance  Discharge Recommendations: Patient would benefit from continued therapy after discharge  AM-PAC Score: 17      Goals  Patient Goals   Patient goals : to get stronger  Short Term Goals  Time Frame for Short term goals: 12 visits  Short term goal 1: Independent bed mobility  Short term goal 2:  Independent sit<>stand  Short term goal 3: Patient to ambulate 50 feet str cane vs aurora walker SBA  Short term goal 4: Patient to perform 25 minutes ther act to facilitate improving endurance    PLAN OF CARE/SAFETY  Plan  Plan: 5-7 times per week  Current Treatment Recommendations: Strengthening;Balance training;Transfer training;Gait training; Endurance training;Patient/Caregiver education & training; Safety education & training;Positioning; Therapeutic activities  Safety Devices  Type of Devices: Left in bed;Gait belt;Nurse notified;Call light within reach; Bed alarm in place  Restraints  Restraints Initially in Place: No    EDUCATION  Education  Education Given To: Patient; Family  Education Provided: Role of Therapy;Plan of Care;Home Exercise Program;Safety;Transfer Training;Mobility Training;Energy Conservation  Education Provided Comments: Pt instructed on pursed lip breathing technique. Pt educated on importance of completing LE HEP throughout day.   Education Method: Verbal  Barriers to Learning: Cognition  Education Outcome: Continued education needed;Verbalized understanding        Therapy Time   Individual Concurrent Group Co-treatment   Time In 9389         Time Out 0872         Minutes Pam Severino 371, student physical therapy assistant under supervision of Ad Kimball, [address] , 07/28/22 at 2:08 PM

## 2022-07-28 NOTE — PROGRESS NOTES
Occupational Therapy  Facility/Department: [facility]  Rehabilitation Occupational Therapy Daily Treatment Note    Date: 22  Patient Name: Lester Hu.       Room:   MRN: [id number]  Account: [de-identified]   : 1950  (75 y.o.) Gender: male         Pt [facility] currently functioning below baseline. Recommend daily inpatient skilled therapy at time of discharge to maximize long term outcomes and prevent re-admission. Please refer to AM-PAC score for current level of function. RN reports patient is medically stable for therapy treatment this date. Chart reviewed prior to treatment and patient is agreeable for therapy. All lines intact and patient positioned comfortably at end of treatment. All patient needs addressed prior to ending therapy session.                Past Medical History:  has a past medical history of Absence of toe ([address] Acquired hypothyroidism, Acute kidney injury ([address] CARLTON (acute kidney injury) ([address] Anemia due to stage 3 chronic kidney disease ([address] Arthritis, Benign hypertension with chronic kidney disease, stage IV ([address] Benign prostatic hyperplasia, Benign prostatic hyperplasia without lower urinary tract symptoms, Bradycardia, Bunion of right foot, Cellulitis of left upper extremity, Cellulitis of left upper limb, Chronic renal failure syndrome, Elevated lactic acid level, Epilepsy ([address] Erectile dysfunction, Esophagitis determined by endoscopy, Essential hypertension, Family history of diabetes mellitus in brother, Fever, Former cigarette smoker, Full dentures, Gastro-esophageal reflux disease with esophagitis, Hepatitis C antibody positive in blood, Hiatal hernia, Hiatal hernia with gastroesophageal reflux, High anion gap metabolic acidosis, History of colonic polyps, Hypertension, Hypertensive disorder, Hypothyroidism, Increased lactic acid level, Influenza A, Need for influenza vaccination, Normocytic normochromic anemia, Osteomyelitis ([address] P-ANCA Factors: pt washed his face while sitting in chair  Pt declined all other ADLs this date. Functional Mobility  Device: Cane  Activity:  (bed to chair)  Assistance Level: Minimal assistance  Skilled Clinical Factors: Pt required Mod VC/tactile cues for upright posture, safety in function, assist with lines, cane safety/mgmt, pacing, pursed lip breathing, all to inc safety/reduce fall risk    Sit to Supine  Skilled Clinical Factors: OLLIE pt in bedside chair with alarm at end of session. Pt agreeable to sit in chair after education on benefits of OOB activity  Supine to Sit  Assistance Level: Moderate assistance  Skilled Clinical Factors: Mod A with trunk    Transfers  Surface: From bed; To chair with arms  Additional Factors: Set-up; Increased time to complete;Hand placement cues  Device: Cane  Sit to Stand  Assistance Level: Minimal assistance  Skilled Clinical Factors: Pt required Mod VC/tactile cues for proper hand placement on stable surface, controlled sit<>Stand, upright posture, pacing, pursed lip breathing, assist with lines, squaring self/AD to surface, all to inc safety/reduce fall risk  Stand to Sit  Assistance Level: Minimal assistance  Skilled Clinical Factors: pt in bedside chair at end of session with BLE elevated on stool/pillow under; to increase overall comfort and reduce swelling as able         Assessment  Assessment  Assessment: Pt educated extensively on using call light before getting up and benefits of SNF placement. Pt with Fair understanding and would benefit from continued education. Pt limited d/t dec cognition, dec strength/endurance, and overall dec safety awareness. Pt would benefit from continued therapy to ensure safety before going home. Skilled OT is indicated to increase overall IND and safety with self-care and functional tasks to return home when appropriate    Activity Tolerance: Patient limited by endurance; Patient tolerated treatment well  Discharge Recommendations: Patient would benefit from continued therapy after discharge  OT Equipment Recommendations  Equipment Needed: Yes  ADL Assistive Devices: Toileting - 3-in-1 Commode;Reacher;Long-handled [place];Long-handled Sponge;Emergency Alert System  Safety Devices  Safety Devices in place: Yes  Type of devices: All fall risk precautions in place;Call light within reach;Gait belt;Patient at risk for falls;Nurse notified; Chair alarm in place; Left in chair    Patient Education  Education  Education Given To: Patient; Family  Education Provided: Role of Therapy; Safety;Transfer Training;Energy Conservation;ADL Function;Plan of Care; Fall Prevention Strategies; Family Education  Education Provided Comments: Pt required assist calling his wife, pt with difficulty navigating his phone. Pt's wife came to pt's room shortly after calling her. OT discussed benefits of SNF placement with pt and his wife, both pt and wife with Good understanding. Pt also educated on call light use/fall prevention. Education Method: Demonstration;Verbal  Barriers to Learning: Cognition  Education Outcome: Continued education needed    Plan  Plan  Times per Week: 4-5x/week 1x/day as fco  Current Treatment Recommendations: Strengthening;ROM;Balance training;Functional mobility training; Endurance training;Cognitive reorientation; Neuromuscular re-education; Safety education & training;Positioning;Patient/Caregiver education & training;Self-Care / ADL;Equipment evaluation, education, & procurement;Home management training;Coordination training;Cognitive/Perceptual training;Pain management    Goals  Patient Goals   Patient goals : Pt's dtr states she wants pt to get back to rehab! Pt states he wants to call his wife! Short Term Goals  Time Frame for Short term goals: by discharge, pt to [facility]  Short Term Goal 1: bed mob tasks with use of rail as needed with SUP. Short Term Goal 2: increase LUE strength by a 1/2 grade to assist with ADL tasks.   Short Term Goal 3: UB ADL to set up/min assist and LB ADL to mod assist of 1 and use of AD/AE and one handed tech as able. Short Term Goal 4: ADL transfers and functional mob with AD to SBA level. Short Term Goal 5: toileting tasks with use of AD/grab bar and BSC to mod assist of 1. Long Term Goals  Long Term Goal 1: Pt to stand with min assist of 1 and AD fco > 5 mins as able to reduce falls in function. Long Term Goal 2: Caregivers/pt to be I with LUE strength HEP, pressure relief, EC/WS and fall prevention tech, and DME/AE and AD recommendations with use of handouts. (will add RUE ROM HEP if clarification/orders received from MD)    AM-PAC Score        AM-[facility] Inpatient Daily Activity Raw Score: 15 (07/28/22 1238)  AM-PAC Inpatient ADL T-Scale Score : 34.69 (07/28/22 1238)  ADL Inpatient CMS 0-100% Score: 56.46 (07/28/22 1238)  ADL Inpatient CMS G-Code Modifier : CK (07/28/22 1238)      Therapy Time   Individual Concurrent Group Co-treatment   Time In 1150         Time Out 1228         Minutes 38               Upon writer exit, call light within reach, pt retired to chair. All lines intact and patient positioned comfortably. All patient needs addressed prior to ending therapy session. Chart reviewed prior to treatment and patient is agreeable for therapy. RN reports patient is medically stable for therapy treatment this date.     Kari Escamilla OTR/L

## 2022-07-28 NOTE — FLOWSHEET NOTE
[place] [facility]  PROGRESS NOTE    Room # 2110/2110-01   Name: Harlan Fierro: Pataaron Diez     Reason for visit: Routine    I visited the patient. Admit Date & Time: 7/25/2022 10:20 PM    Assessment:  Norma Rashid Sr. is a 70 y.o. male in the hospital because pneumonia. Upon entering the room pt was lying in bed      Intervention:  As I was walking past pt's room in hallway pt called for me. Pt expressed gratitude for previous visit. Pt stated that he is being discharged. I engaged in conversation w/ pt actively listening to pt. Outcome:  Pt expressed gratitude for this  visit and for previous visits    Plan:  Chaplains will remain available to offer spiritual and emotional support as needed. Electronically signed by Rosalva Mohs, on 7/28/2022 at 4:00 PM.  Jacy       07/28/22 1559   Encounter Summary   Service Provided For: Patient   Referral/Consult From: Patient   Support System Spouse; Children;Family members   Last Encounter  07/28/22   Complexity of Encounter Low   Begin Time 1540   End Time  1545   Total Time Calculated 5 min   Encounter    Type Follow up   Assessment/Intervention/Outcome   Assessment Hopeful   Intervention Active listening;Sustaining Presence/Ministry of presence   Outcome Engaged in conversation;Expressed Gratitude

## 2022-07-28 NOTE — PROGRESS NOTES
Pt discharged to [facility] via wheelchair/lifestar with belongings  Packet with scripts given to transport personnel to deliver to facility  Pt denies having any further questions at this time  Locked up home medication(s)/personal items given to patient at discharge  Patient/family state they have everything they were admitted with.

## 2022-07-28 NOTE — PROGRESS NOTES
[facility]  Office: [address], DO, Vivi Park, DO, Ernesto Star, DO, Ana Regalado Blood, DO, Alonzo Vincent MD, Marco Lorenzo MD, Romain Kate MD, Ravindra Orozco MD,  Rajeev Agarwal MD, Ok Monae MD, Rosario Arrington, DO, Elizabeth Louise MD,  David Bell MD, Johanna Marks MD, Juni Quiroga, DO, Chadwick Gaines MD, Uday Brown MD, Bernice Santos MD, Marlon Wong, DO, Grazyna Marie MD, Coco Mcgraw MD, Max Pinto, CNP,  Lesley Garcia, CNP, Idania Waddell, CNP, Aicha Cueva, CNP, Tolu Molina PA-C, Cassi Ferrara, DNP, Wilver Lugo, CNP, Tayla Campa, CNP, Anne Tucker, CNP, Armando School, [facility], Noni Bragg, CNP, Freddie Munroe, [facility], Ivonne Gomezelor, [facility], Parish De Leon, CNP, Yuliya Ordaz, CNP, Amanda Nurse, [facility]    Progress Note    7/28/2022    2:13 PM    Name:   Oniel Padilla. MRN:     [id number]     Acct:      [de-identified]   Room:   2110/2110-01   Day:  3  Admit Date:  7/25/2022 10:20 PM    PCP:   Mari Alford MD  Code Status:  Full Code    Subjective:     C/C:   Chief Complaint   Patient presents with    Shortness of Breath    Rib Pain     Interval History Status: improved. Patient seen and examined at bedside, no acute events overnight. Feeling much better , on RA  , better appetite. Moved well with PT  Patient denies any chest pain,  chills, fevers, nausea or vomiting. Patient vitals, labs and all providers notes were reviewed,from overnight shift and morning updates were noted and discussed with the nurse    Brief History:     Per chart  Patient presents to the emergency room today with complaints of shortness of breath. Patient states that he developed shortness of breath yesterday and it had progressively worsened. Patient has a significant past medical history of hypothyroidism, CKD, hypertension, GERD and hep C.   Patient denies any fevers, cough, chills, chest pain, nausea, vomiting and diarrhea. Patient denies being around anyone else that has been sick. Patient denies any issues drinking/swallowing. Patient denies any home oxygen usage. Throughout the emergency room evaluation patient creatinine 1.52. Lactic acid 1.2. Troponin 18. Hemoglobin 11. Chest x-ray shows:   1. Right perihilar consolidation and bilateral lower lobe opacities,   concerning for pneumonia. 2. Small right pleural effusion   3. Suspected large hiatal hernia. Review of Systems:     Review of Systems   Constitutional:  Positive for activity change. Negative for chills, diaphoresis and fever. HENT:  Negative for congestion. Eyes:  Negative for visual disturbance. Respiratory:  Positive for shortness of breath. Negative for wheezing. Cardiovascular:  Negative for chest pain, palpitations and leg swelling. Gastrointestinal:  Negative for abdominal pain, blood in stool, constipation, diarrhea, nausea and vomiting. Genitourinary:  Negative for difficulty urinating. Neurological:  Positive for weakness. Negative for dizziness, light-headedness, numbness and headaches. All other systems reviewed and are negative. Medications:      Allergies:  No Known Allergies    Current Meds:   Scheduled Meds:    ipratropium-albuterol  1 ampule Inhalation BID    ketorolac  1 drop Both Eyes 4x Daily    budesonide-formoterol  2 puff Inhalation BID    aspirin  81 mg Oral Daily    carvedilol  6.25 mg Oral BID WC    ferrous sulfate  325 mg Oral Daily    levothyroxine  175 mcg Oral Daily    lisinopril  20 mg Oral Daily    pantoprazole  40 mg Oral QAM AC    tamsulosin  0.4 mg Oral Nightly    vitamin C  500 mg Oral Daily    vitamin D3  5,000 Units Oral Daily    sodium chloride flush  5-40 mL IntraVENous 2 times per day    enoxaparin  40 mg SubCUTAneous Daily    cefTRIAXone (ROCEPHIN) IV  1,000 mg IntraVENous Q24H    doxycycline monohydrate  100 mg Oral 2 times per day    predniSONE  40 mg Oral Daily    levETIRAcetam  500 mg Oral BID    hydroCHLOROthiazide  12.5 mg Oral Daily     Continuous Infusions:    sodium chloride Stopped (07/28/22 0526)     PRN Meds: sodium chloride flush, sodium chloride, ondansetron **OR** ondansetron, polyethylene glycol, acetaminophen **OR** acetaminophen, albuterol, morphine **OR** morphine, diphenhydrAMINE    Data:     Past Medical History:   has a past medical history of Absence of toe ([facility] Acquired hypothyroidism, Acute kidney injury ([facility] CARLTON (acute kidney injury) ([facility] Anemia due to stage 3 chronic kidney disease ([facility] Arthritis, Benign hypertension with chronic kidney disease, stage IV ([facility] Benign prostatic hyperplasia, Benign prostatic hyperplasia without lower urinary tract symptoms, Bradycardia, Bunion of right foot, Cellulitis of left upper extremity, Cellulitis of left upper limb, Chronic renal failure syndrome, Elevated lactic acid level, Epilepsy ([facility] Erectile dysfunction, Esophagitis determined by endoscopy, Essential hypertension, Family history of diabetes mellitus in brother, Fever, Former cigarette smoker, Full dentures, Gastro-esophageal reflux disease with esophagitis, Hepatitis C antibody positive in blood, Hiatal hernia, Hiatal hernia with gastroesophageal reflux, High anion gap metabolic acidosis, History of colonic polyps, Hypertension, Hypertensive disorder, Hypothyroidism, Increased lactic acid level, Influenza A, Need for influenza vaccination, Normocytic normochromic anemia, Osteomyelitis (HCC), P-ANCA and MPO antibodies positive, Pneumonia, Renal agenesis, Sepsis ([facility] Severe sepsis ([facility] Severe sepsis with acute organ dysfunction ([facility] Skin necrosis ([facility] Solitary kidney, congenital, Spider bite, Urinary retention, Vitamin D deficiency, and Wears glasses. Social History:   reports that he quit smoking about 31 years ago. His smoking use included cigarettes.  He started smoking about 57 years ago. He has a 10.20 pack-year smoking history. He has never used smokeless tobacco. He reports current alcohol use. He reports that he does not currently use drugs after having used the following drugs: Marijuana Peggy Aidan). Family History:   Family History   Problem Relation Age of Onset    High Blood Pressure Mother     Arthritis Mother     Heart Disease Sister     Stomach Cancer Sister     Diabetes Brother        Vitals:  /76   Pulse 55   Temp 98.4 °F (36.9 °C) (Oral)   Resp 16   Wt 180 lb 2 oz (81.7 kg)   SpO2 96%   BMI 27.39 kg/m²   Temp (24hrs), Av.1 °F (36.7 °C), Min:97.9 °F (36.6 °C), Max:98.4 °F (36.9 °C)    No results for input(s): POCGLU in the last 72 hours. I/O (24Hr):     Intake/Output Summary (Last 24 hours) at 2022 1413  Last data filed at 2022 0956  Gross per 24 hour   Intake 445.87 ml   Output 1150 ml   Net -704.13 ml         Labs:  Hematology:  Recent Labs     22  1043   WBC 10.5 10.4   RBC 4.03* 3.88*   HGB 11.0* 10.5*   HCT 35.9* 34.9*   MCV 89.1 89.9   MCH 27.3 27.1   MCHC 30.6 30.1   RDW 13.5 13.6   * 537*   MPV 9.7 9.5       Chemistry:  Recent Labs     22  0040 22  0454 22  1043 22  0925     --  135 138 139   K 5.0  --  5.8* 4.3 3.9     --  101 100 101   CO2 28  --  27 29 27   GLUCOSE 143*  --  158* 105* 116*   BUN 39*  --  37* 42* 41*   CREATININE 1.52*  --  1.55* 1.49* 1.41*   ANIONGAP 9  --  7* 9 11   LABGLOM 45*  --  44* 46* 50*   GFRAA 55*  --  54* 56* >60   CALCIUM 8.9  --  8.8 9.1 9.0   PROBNP 156  --   --   --   --    TROPHS 18 19  --   --   --        Recent Labs     22  2238   PROT 7.4   LABALBU 3.5   AST 14   ALT 19   ALKPHOS 137*   BILITOT 0.25*       ABG:  Lab Results   Component Value Date/Time    PH 7.54 2015 05:42 AM    PCO2 21 2015 05:42 AM    PO2 27 2015 05:42 AM    HCO3 18.1 2015 05:42 AM    O2SAT 62 2015 05:42 AM    FIO2 NOT REPORTED 11/01/2015 05:42 AM     Lab Results   Component Value Date/Time    SPECIAL LT AC 10ML 07/26/2022 12:45 AM     Lab Results   Component Value Date/Time    CULTURE PENDING 07/28/2022 03:27 AM       Radiology:  XR CHEST PORTABLE    Result Date: 7/27/2022  1. Mild improvement in right perihilar and right basilar airspace opacities. Stable trace right pleural effusion. 2.  Air-filled retrocardiac opacity compatible with a large hiatal hernia. XR CHEST PORTABLE    Result Date: 7/26/2022  Unchanged right perihilar and right basilar opacities. Unchanged tiny right pleural effusion. XR CHEST 1 VIEW    Result Date: 7/25/2022  1. Right perihilar consolidation and bilateral lower lobe opacities, concerning for pneumonia. 2. Small right pleural effusion 3. Suspected large hiatal hernia. Physical Examination:        Physical Exam  Vitals and nursing note reviewed. Constitutional:       General: He is not in acute distress. Interventions: Nasal cannula in place. HENT:      Head: Normocephalic and atraumatic. Eyes:      Conjunctiva/sclera: Conjunctivae normal.      Pupils: Pupils are equal, round, and reactive to light. Cardiovascular:      Rate and Rhythm: Normal rate and regular rhythm. Heart sounds: No murmur heard. Pulmonary:      Effort: Pulmonary effort is normal. No accessory muscle usage or respiratory distress. Breath sounds: No stridor. Examination of the right-lower field reveals decreased breath sounds and rales. Examination of the left-lower field reveals decreased breath sounds. Decreased breath sounds, rhonchi and rales present. No wheezing. Abdominal:      General: Bowel sounds are normal. There is no distension. Palpations: Abdomen is soft. Abdomen is not rigid. Tenderness: There is no abdominal tenderness. There is no guarding. Musculoskeletal:         General: No tenderness. Skin:     General: Skin is warm and dry. Findings: No erythema, lesion or rash. Neurological:      Mental Status: He is alert and oriented to person, place, and time. Cranial Nerves: No cranial nerve deficit. Motor: No seizure activity. Psychiatric:         Speech: Speech normal.         Behavior: Behavior normal. Behavior is cooperative. Assessment:        Hospital Problems             Last Modified POA    * (Principal) Pneumonia 7/25/2022 Yes    COPD exacerbation ([address] 7/26/2022 Yes    Benign prostatic hyperplasia without lower urinary tract symptoms 7/26/2022 Yes    CKD (chronic kidney disease), stage III ([address] 7/26/2022 Yes    Solitary kidney, congenital 7/26/2022 Yes    Essential hypertension 7/26/2022 Yes    Acquired hypothyroidism 7/26/2022 Yes    Gastro-esophageal reflux disease with esophagitis 7/26/2022 Yes     Plan:        Principal Problem:    Pneumonia  Active Problems:    COPD exacerbation ([address]    Benign prostatic hyperplasia without lower urinary tract symptoms    CKD (chronic kidney disease), stage III (HCC)    Solitary kidney, congenital    Essential hypertension    Acquired hypothyroidism    Gastro-esophageal reflux disease with esophagitis  Resolved Problems:    * No resolved hospital problems.  *     Much better  Continue Abx  DC fluids  Lasix x1  Off oxygen  Continue  breathing treatment and resume Symbicort  PO steroids  Creat at baseline  Continue other chronic meds for his other chronic stable conditions  PT/OT  Hopefully DC later today   Discussed with the patient and the nurse        Jovon Clay MD  7/28/2022  2:13 PM

## 2022-07-28 NOTE — CARE COORDINATION
Social work: Authorization obtained for Maggie Menchaca. Patient to dc to [facility] via [address] [facility] [address]  at 4:00PM.  # for RN report: [phone]. Completed CARSON faxed to [phone]. Informed RN, pt, and facility of dc time, agreeable to plan. No HENS needed, patient from [facility].

## 2022-07-28 NOTE — RT PROTOCOL NOTE
RT Inhaler-Nebulizer Bronchodilator Protocol Note    There is a bronchodilator order in the chart from a provider indicating to follow the RT Bronchodilator Protocol and there is an Initiate RT Inhaler-Nebulizer Bronchodilator Protocol order as well (see protocol at bottom of note). CXR Findings:  XR CHEST PORTABLE    Result Date: 7/27/2022  1. Mild improvement in right perihilar and right basilar airspace opacities. Stable trace right pleural effusion. 2.  Air-filled retrocardiac opacity compatible with a large hiatal hernia. The findings from the last RT Protocol Assessment were as follows:   History Pulmonary Disease: None or smoker <15 pack years  Respiratory Pattern: Dyspnea on exertion or RR 21-25 bpm  Breath Sounds: Slightly diminished and/or crackles  Cough: Strong, productive  Indication for Bronchodilator Therapy: Decreased or absent breath sounds  Bronchodilator Assessment Score: 5    Aerosolized bronchodilator medication orders have been revised according to the RT Inhaler-Nebulizer Bronchodilator Protocol below. Respiratory Therapist to perform RT Therapy Protocol Assessment initially then follow the protocol. Repeat RT Therapy Protocol Assessment PRN for score 0-3 or on second treatment, BID, and PRN for scores above 3. No Indications - adjust the frequency to every 6 hours PRN wheezing or bronchospasm, if no treatments needed after 48 hours then discontinue using Per Protocol order mode. If indication present, adjust the RT bronchodilator orders based on the Bronchodilator Assessment Score as indicated below. Use Inhaler orders unless patient has one or more of the following: on home nebulizer, not able to hold breath for 10 seconds, is not alert and oriented, cannot activate and use MDI correctly, or respiratory rate 25 breaths per minute or more, then use the equivalent nebulizer order(s) with same Frequency and PRN reasons based on the score.   If a patient is on this medication at home then do not decrease Frequency below that used at home. 0-3 - enter or revise RT bronchodilator order(s) to equivalent RT Bronchodilator order with Frequency of every 4 hours PRN for wheezing or increased work of breathing using Per Protocol order mode. 4-6 - enter or revise RT Bronchodilator order(s) to two equivalent RT bronchodilator orders with one order with BID Frequency and one order with Frequency of every 4 hours PRN wheezing or increased work of breathing using Per Protocol order mode. 7-10 - enter or revise RT Bronchodilator order(s) to two equivalent RT bronchodilator orders with one order with TID Frequency and one order with Frequency of every 4 hours PRN wheezing or increased work of breathing using Per Protocol order mode. 11-13 - enter or revise RT Bronchodilator order(s) to one equivalent RT bronchodilator order with QID Frequency and an Albuterol order with Frequency of every 4 hours PRN wheezing or increased work of breathing using Per Protocol order mode. Greater than 13 - enter or revise RT Bronchodilator order(s) to one equivalent RT bronchodilator order with every 4 hours Frequency and an Albuterol order with Frequency of every 2 hours PRN wheezing or increased work of breathing using Per Protocol order mode. RT to enter RT Home Evaluation for COPD & MDI Assessment order using Per Protocol order mode.     Electronically signed by Melani Gibson RCP on 7/28/2022 at 8:14 AM

## 2022-07-30 LAB
CULTURE: ABNORMAL
DIRECT EXAM: ABNORMAL
SPECIMEN DESCRIPTION: ABNORMAL

## 2022-07-31 LAB
CULTURE: NORMAL
CULTURE: NORMAL
Lab: NORMAL
Lab: NORMAL
SPECIMEN DESCRIPTION: NORMAL
SPECIMEN DESCRIPTION: NORMAL

## 2022-08-01 ENCOUNTER — HOSPITAL ENCOUNTER (OUTPATIENT)
Age: 72
Setting detail: SPECIMEN
Discharge: HOME | End: 2022-08-01

## 2022-08-01 LAB
ANION GAP SERPL CALCULATED.3IONS-SCNC: 9 MMOL/L (ref 9–17)
BUN BLDV-MCNC: 40 MG/DL (ref 8–23)
BUN/CREAT BLD: 27 (ref 9–20)
CALCIUM SERPL-MCNC: 8.8 MG/DL (ref 8.6–10.4)
CHLORIDE BLD-SCNC: 105 MMOL/L (ref 98–107)
CO2: 25 MMOL/L (ref 20–31)
CREAT SERPL-MCNC: 1.49 MG/DL (ref 0.7–1.2)
GFR AFRICAN AMERICAN: 56 ML/MIN
GFR NON-AFRICAN AMERICAN: 46 ML/MIN
GFR SERPL CREATININE-BSD FRML MDRD: ABNORMAL ML/MIN/{1.73_M2}
GLUCOSE BLD-MCNC: 94 MG/DL (ref 70–99)
HCT VFR BLD CALC: 36.1 % (ref 40.7–50.3)
HEMOGLOBIN: 10.9 G/DL (ref 13–17)
IRON SATURATION: 21 % (ref 20–55)
IRON: 41 UG/DL (ref 59–158)
MCH RBC QN AUTO: 26.4 PG (ref 25.2–33.5)
MCHC RBC AUTO-ENTMCNC: 30.2 G/DL (ref 28.4–34.8)
MCV RBC AUTO: 87.4 FL (ref 82.6–102.9)
NRBC AUTOMATED: 0 PER 100 WBC
PDW BLD-RTO: 13.5 % (ref 11.8–14.4)
PLATELET # BLD: 462 K/UL (ref 138–453)
PMV BLD AUTO: 9.9 FL (ref 8.1–13.5)
POTASSIUM SERPL-SCNC: 4 MMOL/L (ref 3.7–5.3)
RBC # BLD: 4.13 M/UL (ref 4.21–5.77)
SODIUM BLD-SCNC: 139 MMOL/L (ref 135–144)
THYROXINE, FREE: 1.69 NG/DL (ref 0.93–1.7)
TOTAL IRON BINDING CAPACITY: 199 UG/DL (ref 250–450)
TSH SERPL DL<=0.05 MIU/L-ACNC: 0.43 UIU/ML (ref 0.3–5)
UNSATURATED IRON BINDING CAPACITY: 158 UG/DL (ref 112–347)
VITAMIN D 25-HYDROXY: 54 NG/ML
WBC # BLD: 9.1 K/UL (ref 3.5–11.3)

## 2022-08-01 PROCEDURE — 36415 COLL VENOUS BLD VENIPUNCTURE: CPT

## 2022-08-01 PROCEDURE — 84443 ASSAY THYROID STIM HORMONE: CPT

## 2022-08-01 PROCEDURE — 83550 IRON BINDING TEST: CPT

## 2022-08-01 PROCEDURE — P9603 ONE-WAY ALLOW PRORATED MILES: HCPCS

## 2022-08-01 PROCEDURE — 80048 BASIC METABOLIC PNL TOTAL CA: CPT

## 2022-08-01 PROCEDURE — 85027 COMPLETE CBC AUTOMATED: CPT

## 2022-08-01 PROCEDURE — 84439 ASSAY OF FREE THYROXINE: CPT

## 2022-08-01 PROCEDURE — 83540 ASSAY OF IRON: CPT

## 2022-08-01 PROCEDURE — 82306 VITAMIN D 25 HYDROXY: CPT

## 2023-09-28 LAB — GLUCOSE BLD-MCNC: 102 MG/DL (ref 70–105)

## 2024-08-19 NOTE — PLAN OF CARE
Patient seen in office 8-19-24.   Problem: Pain:  Description: Pain management should include both nonpharmacologic and pharmacologic interventions. Goal: Pain level will decrease  Description: Pain level will decrease  8/26/2020 1016 by Edin Spaulding RN  Outcome: Ongoing  Note: Pain level assessment complete and patient currently denies any pain upon assessment.   Patient educated on pain scale and control interventions  No PRN pain medication given  Patient instructed to call out with new onset of pain or unrelieved pain    8/25/2020 2326 by Deepika Matson RN  Outcome: Ongoing  Goal: Control of acute pain  Description: Control of acute pain  8/26/2020 1016 by Edin Spaulding RN  Outcome: Ongoing  8/25/2020 2326 by Mera Ledesma RN  Outcome: Ongoing  Goal: Control of chronic pain  Description: Control of chronic pain  8/26/2020 1016 by Edin Spaulding RN  Outcome: Ongoing  8/25/2020 2326 by Mera Ledesma RN  Outcome: Ongoing     Problem: Skin Integrity:  Goal: Will show no infection signs and symptoms  Description: Will show no infection signs and symptoms  8/26/2020 1016 by Edin Spaulding RN  Outcome: Ongoing  8/25/2020 2326 by Mera Ledesma RN  Outcome: Ongoing  Goal: Absence of new skin breakdown  Description: Absence of new skin breakdown  8/26/2020 1016 by Edin Spaulding RN  Outcome: Ongoing  8/25/2020 2326 by Mera Ledesma RN  Outcome: Ongoing

## (undated) DEVICE — INTENDED FOR TISSUE SEPARATION, AND OTHER PROCEDURES THAT REQUIRE A SHARP SURGICAL BLADE TO PUNCTURE OR CUT.: Brand: BARD-PARKER ® CARBON RIB-BACK BLADES

## (undated) DEVICE — GLOVE SURG SZ 7 L12IN FNGR THK79MIL GRN LTX FREE

## (undated) DEVICE — TOURNIQUET CUF BLD PRESSURE 4X18 IN 2 PRT SINGLE BLDR RED

## (undated) DEVICE — DRILL BIT

## (undated) DEVICE — Device

## (undated) DEVICE — STANDARD HYPODERMIC NEEDLE,POLYPROPYLENE HUB: Brand: MONOJECT

## (undated) DEVICE — CONTAINER,SPECIMEN,OR STERILE,4OZ: Brand: MEDLINE

## (undated) DEVICE — GLOVE SURG SZ 7 L12IN FNGR THK87MIL WHT LTX FREE

## (undated) DEVICE — GARMENT,MEDLINE,DVT,INT,CALF,MED, GEN2: Brand: MEDLINE

## (undated) DEVICE — 35 ML SYRINGE LUER-LOCK TIP: Brand: MONOJECT

## (undated) DEVICE — THIN OFFSET (9.0 X 0.38 X 25.0MM)

## (undated) DEVICE — GOWN,SIRUS,NONRNF,SETINSLV,XL,20/CS: Brand: MEDLINE

## (undated) DEVICE — DRAPE C ARM UNIV W41XL74IN CLR PLAS XR VELC CLSR POLY STRP

## (undated) DEVICE — 4.0MM ROUND SOLID CARBIDE BUR MEDIUM

## (undated) DEVICE — GLOVE SURG SZ 65 L12IN FNGR THK87MIL WHT LTX FREE

## (undated) DEVICE — GLOVE SURG SZ 65 CRM LTX FREE POLYISOPRENE POLYMER BEAD ANTI

## (undated) DEVICE — TUBING, SUCTION, 1/4" X 12', STRAIGHT: Brand: MEDLINE

## (undated) DEVICE — K-WIRE BLUNT/TROCAR
Type: IMPLANTABLE DEVICE | Site: FOOT | Status: NON-FUNCTIONAL
Removed: 2018-09-07

## (undated) DEVICE — MASTISOL ADHESIVE LIQ 2/3ML

## (undated) DEVICE — GLOVE EXAM M L95IN FNGR THK35MIL PALM THK24MIL OFF WHT

## (undated) DEVICE — STRIP SKIN CLSR W0.25XL4IN WHT SPUNBOUND FBR NYL HI ADH

## (undated) DEVICE — PADDING UNDERCAST W4INXL4YD COT FBR LO LINTING WYTEX

## (undated) DEVICE — 3M™ WARMING BLANKET, UPPER BODY, 10 PER CASE, 42268: Brand: BAIR HUGGER™

## (undated) DEVICE — IMPLANTABLE DEVICE
Type: IMPLANTABLE DEVICE | Site: FOOT | Status: NON-FUNCTIONAL
Removed: 2018-09-07

## (undated) DEVICE — DRILL BIT: Brand: DART-FIRE

## (undated) DEVICE — BLANKET WRM W40.2XL55.9IN IORT LO BODY + MISTRAL AIR

## (undated) DEVICE — CHLORAPREP 26ML ORANGE

## (undated) DEVICE — K-WIRE BLUNT/TROCAR
Type: IMPLANTABLE DEVICE | Site: FOOT | Status: NON-FUNCTIONAL
Removed: 2018-11-09

## (undated) DEVICE — IMPLANTABLE DEVICE
Type: IMPLANTABLE DEVICE | Site: FOOT | Status: NON-FUNCTIONAL
Removed: 2018-11-09

## (undated) DEVICE — GAUZE,SPONGE,4"X4",16PLY,XRAY,STRL,LF: Brand: MEDLINE

## (undated) DEVICE — PADDING CAST W4INXL4YD SPUN DACRON POLY POR SYN VERSATILE

## (undated) DEVICE — YANKAUER,FLEXIBLE HANDLE,REGLR CAPACITY: Brand: MEDLINE INDUSTRIES, INC.

## (undated) DEVICE — SKIN PREP TRAY W/CHG: Brand: MEDLINE INDUSTRIES, INC.